# Patient Record
Sex: FEMALE | Race: BLACK OR AFRICAN AMERICAN | NOT HISPANIC OR LATINO | Employment: OTHER | URBAN - METROPOLITAN AREA
[De-identification: names, ages, dates, MRNs, and addresses within clinical notes are randomized per-mention and may not be internally consistent; named-entity substitution may affect disease eponyms.]

---

## 2022-01-01 ENCOUNTER — HISTORICAL (OUTPATIENT)
Dept: ADMINISTRATIVE | Facility: HOSPITAL | Age: 82
End: 2022-01-01

## 2022-01-01 ENCOUNTER — OFFICE VISIT (OUTPATIENT)
Dept: NEUROLOGY | Facility: CLINIC | Age: 82
End: 2022-01-01
Payer: MEDICARE

## 2022-01-01 ENCOUNTER — HOSPITAL ENCOUNTER (OUTPATIENT)
Dept: TELEMEDICINE | Facility: HOSPITAL | Age: 82
Discharge: HOME | End: 2022-01-29
Payer: COMMERCIAL

## 2022-01-01 ENCOUNTER — HOSPITAL ENCOUNTER (EMERGENCY)
Facility: HOSPITAL | Age: 82
Discharge: HOME | End: 2022-06-17
Attending: INTERNAL MEDICINE
Payer: MEDICARE

## 2022-01-01 ENCOUNTER — HOSPITAL ENCOUNTER (INPATIENT)
Facility: HOSPITAL | Age: 82
LOS: 27 days | Discharge: NURSING, REHAB OR LONG-TERM CARE | DRG: 064 | End: 2022-11-03
Attending: INTERNAL MEDICINE | Admitting: INTERNAL MEDICINE
Payer: MEDICARE

## 2022-01-01 ENCOUNTER — HISTORICAL (OUTPATIENT)
Dept: ADMINISTRATIVE | Facility: HOSPITAL | Age: 82
End: 2022-01-01
Payer: MEDICARE

## 2022-01-01 ENCOUNTER — ANESTHESIA EVENT (OUTPATIENT)
Dept: ENDOSCOPY | Facility: HOSPITAL | Age: 82
End: 2022-01-01

## 2022-01-01 ENCOUNTER — HOSPITAL ENCOUNTER (OUTPATIENT)
Dept: RADIOLOGY | Facility: HOSPITAL | Age: 82
Discharge: HOME | DRG: 064 | End: 2022-10-11
Attending: INTERNAL MEDICINE
Payer: MEDICARE

## 2022-01-01 ENCOUNTER — HOSPITAL ENCOUNTER (OUTPATIENT)
Dept: RADIOLOGY | Facility: HOSPITAL | Age: 82
Discharge: HOME | DRG: 064 | End: 2022-10-20
Attending: INTERNAL MEDICINE
Payer: MEDICARE

## 2022-01-01 ENCOUNTER — ANESTHESIA (OUTPATIENT)
Dept: ENDOSCOPY | Facility: HOSPITAL | Age: 82
End: 2022-01-01
Payer: MEDICARE

## 2022-01-01 ENCOUNTER — OFFICE VISIT (OUTPATIENT)
Dept: URGENT CARE | Facility: CLINIC | Age: 82
End: 2022-01-01
Payer: MEDICARE

## 2022-01-01 VITALS
BODY MASS INDEX: 18.4 KG/M2 | HEIGHT: 62 IN | SYSTOLIC BLOOD PRESSURE: 138 MMHG | TEMPERATURE: 98 F | DIASTOLIC BLOOD PRESSURE: 68 MMHG | WEIGHT: 100 LBS | HEART RATE: 69 BPM | RESPIRATION RATE: 18 BRPM | OXYGEN SATURATION: 97 %

## 2022-01-01 VITALS
BODY MASS INDEX: 19.26 KG/M2 | HEIGHT: 61 IN | SYSTOLIC BLOOD PRESSURE: 122 MMHG | WEIGHT: 102 LBS | DIASTOLIC BLOOD PRESSURE: 76 MMHG

## 2022-01-01 VITALS
WEIGHT: 104 LBS | TEMPERATURE: 98 F | SYSTOLIC BLOOD PRESSURE: 168 MMHG | DIASTOLIC BLOOD PRESSURE: 90 MMHG | RESPIRATION RATE: 16 BRPM | HEART RATE: 93 BPM | HEIGHT: 60 IN | OXYGEN SATURATION: 93 % | BODY MASS INDEX: 20.42 KG/M2

## 2022-01-01 VITALS
SYSTOLIC BLOOD PRESSURE: 176 MMHG | DIASTOLIC BLOOD PRESSURE: 80 MMHG | TEMPERATURE: 98 F | OXYGEN SATURATION: 97 % | RESPIRATION RATE: 16 BRPM | HEART RATE: 64 BPM | BODY MASS INDEX: 19.32 KG/M2 | HEIGHT: 62 IN | WEIGHT: 105 LBS

## 2022-01-01 VITALS
BODY MASS INDEX: 19.4 KG/M2 | SYSTOLIC BLOOD PRESSURE: 122 MMHG | DIASTOLIC BLOOD PRESSURE: 84 MMHG | WEIGHT: 102.75 LBS | HEIGHT: 61 IN

## 2022-01-01 DIAGNOSIS — M79.672 FOOT PAIN, LEFT: ICD-10-CM
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
## 2022-01-01 DIAGNOSIS — R47.01 APHASIA: ICD-10-CM

## 2022-01-01 DIAGNOSIS — I47.10 SVT (SUPRAVENTRICULAR TACHYCARDIA): ICD-10-CM

## 2022-01-01 DIAGNOSIS — I63.9 STROKE: Primary | ICD-10-CM

## 2022-01-01 DIAGNOSIS — R56.9 SEIZURES: ICD-10-CM

## 2022-01-01 DIAGNOSIS — R00.0 SINUS TACHYCARDIA: ICD-10-CM

## 2022-01-01 DIAGNOSIS — R82.90 FOUL SMELLING URINE: Primary | ICD-10-CM

## 2022-01-01 DIAGNOSIS — R13.12 OROPHARYNGEAL DYSPHAGIA: ICD-10-CM

## 2022-01-01 DIAGNOSIS — G45.9 TIA (TRANSIENT ISCHEMIC ATTACK): ICD-10-CM

## 2022-01-01 DIAGNOSIS — I63.9 CEREBROVASCULAR ACCIDENT (CVA), UNSPECIFIED MECHANISM: Primary | ICD-10-CM

## 2022-01-01 DIAGNOSIS — R07.9 CHEST PAIN: ICD-10-CM

## 2022-01-01 DIAGNOSIS — F03.91 DEMENTIA WITH BEHAVIORAL DISTURBANCE, UNSPECIFIED DEMENTIA TYPE: ICD-10-CM
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
## 2022-01-01 DIAGNOSIS — R29.90 STROKE-LIKE SYMPTOMS: ICD-10-CM

## 2022-01-01 DIAGNOSIS — I65.29 CAROTID ARTERY STENOSIS: ICD-10-CM

## 2022-01-01 LAB
ABS NEUT CALC (OHS): 1.32 X10(3)/MCL (ref 2.1–9.2)
ALBUMIN SERPL-MCNC: 2.4 GM/DL (ref 3.4–4.8)
ALBUMIN SERPL-MCNC: 2.8 GM/DL (ref 3.4–4.8)
ALBUMIN SERPL-MCNC: 2.9 GM/DL (ref 3.4–4.8)
ALBUMIN SERPL-MCNC: 2.9 GM/DL (ref 3.4–4.8)
ALBUMIN SERPL-MCNC: 3.4 GM/DL (ref 3.4–4.8)
ALBUMIN SERPL-MCNC: 3.4 GM/DL (ref 3.4–4.8)
ALBUMIN SERPL-MCNC: 3.5 GM/DL (ref 3.4–4.8)
ALBUMIN SERPL-MCNC: 3.5 GM/DL (ref 3.4–4.8)
ALBUMIN SERPL-MCNC: 3.6 GM/DL (ref 3.4–4.8)
ALBUMIN SERPL-MCNC: 4.2 GM/DL (ref 3.4–4.8)
ALBUMIN/GLOB SERPL: 0.6 RATIO (ref 1.1–2)
ALBUMIN/GLOB SERPL: 0.8 RATIO (ref 1.1–2)
ALBUMIN/GLOB SERPL: 0.9 RATIO (ref 1.1–2)
ALBUMIN/GLOB SERPL: 1 RATIO (ref 1.1–2)
ALBUMIN/GLOB SERPL: 1.1 RATIO (ref 1.1–2)
ALBUMIN/GLOB SERPL: 1.1 RATIO (ref 1.1–2)
ALP SERPL-CCNC: 105 UNIT/L (ref 40–150)
ALP SERPL-CCNC: 67 UNIT/L (ref 40–150)
ALP SERPL-CCNC: 72 UNIT/L (ref 40–150)
ALP SERPL-CCNC: 75 UNIT/L (ref 40–150)
ALP SERPL-CCNC: 87 UNIT/L (ref 40–150)
ALP SERPL-CCNC: 88 UNIT/L (ref 40–150)
ALP SERPL-CCNC: 95 UNIT/L (ref 40–150)
ALP SERPL-CCNC: 95 UNIT/L (ref 40–150)
ALP SERPL-CCNC: 96 UNIT/L (ref 40–150)
ALP SERPL-CCNC: 98 UNIT/L (ref 40–150)
ALT SERPL-CCNC: 12 UNIT/L (ref 0–55)
ALT SERPL-CCNC: 12 UNIT/L (ref 0–55)
ALT SERPL-CCNC: 13 UNIT/L (ref 0–55)
ALT SERPL-CCNC: 14 UNIT/L (ref 0–55)
ALT SERPL-CCNC: 14 UNIT/L (ref 0–55)
ALT SERPL-CCNC: 15 UNIT/L (ref 0–55)
ALT SERPL-CCNC: 17 UNIT/L (ref 0–55)
ALT SERPL-CCNC: 17 UNIT/L (ref 0–55)
ALT SERPL-CCNC: 19 UNIT/L (ref 0–55)
ALT SERPL-CCNC: 19 UNIT/L (ref 0–55)
ANION GAP SERPL CALC-SCNC: 11 MEQ/L
ANION GAP SERPL CALC-SCNC: 12 MEQ/L
ANION GAP SERPL CALC-SCNC: 13 MEQ/L
ANION GAP SERPL CALC-SCNC: 13 MEQ/L
ANION GAP SERPL CALC-SCNC: 7 MEQ/L
ANION GAP SERPL CALC-SCNC: 8 MEQ/L
ANION GAP SERPL CALC-SCNC: 8 MEQ/L
ANION GAP SERPL CALC-SCNC: 9 MEQ/L
ANISOCYTOSIS BLD QL SMEAR: ABNORMAL
APPEARANCE UR: CLEAR
APTT PPP: 40.3 SECONDS (ref 23.4–33.9)
AST SERPL-CCNC: 22 UNIT/L (ref 5–34)
AST SERPL-CCNC: 25 UNIT/L (ref 5–34)
AST SERPL-CCNC: 28 UNIT/L (ref 5–34)
AST SERPL-CCNC: 33 UNIT/L (ref 5–34)
AST SERPL-CCNC: 35 UNIT/L (ref 5–34)
AST SERPL-CCNC: 37 UNIT/L (ref 5–34)
AST SERPL-CCNC: 37 UNIT/L (ref 5–34)
AST SERPL-CCNC: 43 UNIT/L (ref 5–34)
AST SERPL-CCNC: 52 UNIT/L (ref 5–34)
AST SERPL-CCNC: 56 UNIT/L (ref 5–34)
BACTERIA #/AREA URNS AUTO: ABNORMAL /HPF
BACTERIA BLD CULT: NORMAL
BACTERIA UR CULT: NO GROWTH
BASOPHILS # BLD AUTO: 0.04 X10(3)/MCL (ref 0–0.2)
BASOPHILS # BLD AUTO: 0.05 X10(3)/MCL (ref 0–0.2)
BASOPHILS # BLD AUTO: 0.06 X10(3)/MCL (ref 0–0.2)
BASOPHILS # BLD AUTO: 0.07 X10(3)/MCL (ref 0–0.2)
BASOPHILS # BLD AUTO: 0.08 X10(3)/MCL (ref 0–0.2)
BASOPHILS # BLD AUTO: 0.09 X10(3)/MCL (ref 0–0.2)
BASOPHILS # BLD AUTO: 0.1 X10(3)/MCL (ref 0–0.2)
BASOPHILS # BLD AUTO: 0.12 X10(3)/MCL (ref 0–0.2)
BASOPHILS # BLD AUTO: 0.12 X10(3)/MCL (ref 0–0.2)
BASOPHILS # BLD AUTO: 0.14 X10(3)/MCL (ref 0–0.2)
BASOPHILS # BLD AUTO: 0.16 X10(3)/MCL (ref 0–0.2)
BASOPHILS NFR BLD AUTO: 0.3 %
BASOPHILS NFR BLD AUTO: 0.5 %
BASOPHILS NFR BLD AUTO: 0.7 %
BASOPHILS NFR BLD AUTO: 0.7 %
BASOPHILS NFR BLD AUTO: 0.8 %
BASOPHILS NFR BLD AUTO: 1 %
BASOPHILS NFR BLD AUTO: 1 %
BASOPHILS NFR BLD AUTO: 1.2 %
BASOPHILS NFR BLD AUTO: 1.4 %
BASOPHILS NFR BLD AUTO: 1.5 %
BASOPHILS NFR BLD AUTO: 1.7 %
BILIRUB UR QL STRIP.AUTO: NEGATIVE MG/DL
BILIRUB UR QL STRIP: NEGATIVE
BILIRUBIN DIRECT+TOT PNL SERPL-MCNC: 0.3 MG/DL
BILIRUBIN DIRECT+TOT PNL SERPL-MCNC: 0.4 MG/DL
BILIRUBIN DIRECT+TOT PNL SERPL-MCNC: 0.5 MG/DL
BILIRUBIN DIRECT+TOT PNL SERPL-MCNC: 0.6 MG/DL
BNP BLD-MCNC: 83.7 PG/ML
BUN SERPL-MCNC: 11.4 MG/DL (ref 9.8–20.1)
BUN SERPL-MCNC: 12.4 MG/DL (ref 9.8–20.1)
BUN SERPL-MCNC: 14.2 MG/DL (ref 9.8–20.1)
BUN SERPL-MCNC: 15.2 MG/DL (ref 9.8–20.1)
BUN SERPL-MCNC: 18.6 MG/DL (ref 9.8–20.1)
BUN SERPL-MCNC: 19 MG/DL (ref 9.8–20.1)
BUN SERPL-MCNC: 19.3 MG/DL (ref 9.8–20.1)
BUN SERPL-MCNC: 19.6 MG/DL (ref 9.8–20.1)
BUN SERPL-MCNC: 19.7 MG/DL (ref 9.8–20.1)
BUN SERPL-MCNC: 20.1 MG/DL (ref 9.8–20.1)
BUN SERPL-MCNC: 21.1 MG/DL (ref 9.8–20.1)
BUN SERPL-MCNC: 25.6 MG/DL (ref 9.8–20.1)
BUN SERPL-MCNC: 27.5 MG/DL (ref 9.8–20.1)
BUN SERPL-MCNC: 28.3 MG/DL (ref 9.8–20.1)
BUN SERPL-MCNC: 28.7 MG/DL (ref 9.8–20.1)
BUN SERPL-MCNC: 31.1 MG/DL (ref 9.8–20.1)
BUN SERPL-MCNC: 4.5 MG/DL (ref 9.8–20.1)
BUN SERPL-MCNC: 5.4 MG/DL (ref 9.8–20.1)
BUN SERPL-MCNC: 6.6 MG/DL (ref 9.8–20.1)
BUN SERPL-MCNC: 9.2 MG/DL (ref 9.8–20.1)
CALCIUM SERPL-MCNC: 10.4 MG/DL (ref 8.4–10.2)
CALCIUM SERPL-MCNC: 10.5 MG/DL (ref 8.4–10.2)
CALCIUM SERPL-MCNC: 8.2 MG/DL (ref 8.4–10.2)
CALCIUM SERPL-MCNC: 8.4 MG/DL (ref 8.4–10.2)
CALCIUM SERPL-MCNC: 8.5 MG/DL (ref 8.4–10.2)
CALCIUM SERPL-MCNC: 8.7 MG/DL (ref 8.4–10.2)
CALCIUM SERPL-MCNC: 8.7 MG/DL (ref 8.4–10.2)
CALCIUM SERPL-MCNC: 8.9 MG/DL (ref 8.4–10.2)
CALCIUM SERPL-MCNC: 9 MG/DL (ref 8.4–10.2)
CALCIUM SERPL-MCNC: 9 MG/DL (ref 8.4–10.2)
CALCIUM SERPL-MCNC: 9.3 MG/DL (ref 8.4–10.2)
CALCIUM SERPL-MCNC: 9.5 MG/DL (ref 8.4–10.2)
CALCIUM SERPL-MCNC: 9.6 MG/DL (ref 8.4–10.2)
CALCIUM SERPL-MCNC: 9.6 MG/DL (ref 8.4–10.2)
CALCIUM SERPL-MCNC: 9.7 MG/DL (ref 8.4–10.2)
CALCIUM SERPL-MCNC: 9.8 MG/DL (ref 8.4–10.2)
CHLORIDE SERPL-SCNC: 103 MMOL/L (ref 98–107)
CHLORIDE SERPL-SCNC: 103 MMOL/L (ref 98–107)
CHLORIDE SERPL-SCNC: 104 MMOL/L (ref 98–107)
CHLORIDE SERPL-SCNC: 104 MMOL/L (ref 98–107)
CHLORIDE SERPL-SCNC: 105 MMOL/L (ref 98–107)
CHLORIDE SERPL-SCNC: 105 MMOL/L (ref 98–107)
CHLORIDE SERPL-SCNC: 108 MMOL/L (ref 98–107)
CHLORIDE SERPL-SCNC: 109 MMOL/L (ref 98–107)
CHLORIDE SERPL-SCNC: 109 MMOL/L (ref 98–107)
CHLORIDE SERPL-SCNC: 110 MMOL/L (ref 98–107)
CHLORIDE SERPL-SCNC: 112 MMOL/L (ref 98–107)
CHLORIDE SERPL-SCNC: 113 MMOL/L (ref 98–107)
CHLORIDE SERPL-SCNC: 113 MMOL/L (ref 98–107)
CHLORIDE SERPL-SCNC: 114 MMOL/L (ref 98–107)
CHLORIDE SERPL-SCNC: 99 MMOL/L (ref 98–107)
CHOLEST SERPL-MCNC: 169 MG/DL
CHOLEST/HDLC SERPL: 3 {RATIO} (ref 0–5)
CO2 SERPL-SCNC: 16 MMOL/L (ref 23–31)
CO2 SERPL-SCNC: 19 MMOL/L (ref 23–31)
CO2 SERPL-SCNC: 20 MMOL/L (ref 23–31)
CO2 SERPL-SCNC: 20 MMOL/L (ref 23–31)
CO2 SERPL-SCNC: 21 MMOL/L (ref 23–31)
CO2 SERPL-SCNC: 23 MMOL/L (ref 23–31)
CO2 SERPL-SCNC: 25 MMOL/L (ref 23–31)
CO2 SERPL-SCNC: 25 MMOL/L (ref 23–31)
CO2 SERPL-SCNC: 28 MMOL/L (ref 23–31)
CO2 SERPL-SCNC: 28 MMOL/L (ref 23–31)
COLOR UR AUTO: YELLOW
CORRECTED TEMPERATURE (PCO2): 31 MMHG (ref 35–45)
CORRECTED TEMPERATURE (PH): 7.36 (ref 7.35–7.45)
CORRECTED TEMPERATURE (PO2): 160 MMHG (ref 80–100)
CREAT SERPL-MCNC: 0.73 MG/DL (ref 0.55–1.02)
CREAT SERPL-MCNC: 0.77 MG/DL (ref 0.55–1.02)
CREAT SERPL-MCNC: 0.77 MG/DL (ref 0.55–1.02)
CREAT SERPL-MCNC: 0.8 MG/DL (ref 0.55–1.02)
CREAT SERPL-MCNC: 0.8 MG/DL (ref 0.55–1.02)
CREAT SERPL-MCNC: 0.83 MG/DL (ref 0.55–1.02)
CREAT SERPL-MCNC: 0.84 MG/DL (ref 0.55–1.02)
CREAT SERPL-MCNC: 0.87 MG/DL (ref 0.55–1.02)
CREAT SERPL-MCNC: 0.88 MG/DL (ref 0.55–1.02)
CREAT SERPL-MCNC: 0.89 MG/DL (ref 0.55–1.02)
CREAT SERPL-MCNC: 0.9 MG/DL (ref 0.55–1.02)
CREAT SERPL-MCNC: 0.92 MG/DL (ref 0.55–1.02)
CREAT SERPL-MCNC: 0.93 MG/DL (ref 0.55–1.02)
CREAT SERPL-MCNC: 0.94 MG/DL (ref 0.55–1.02)
CREAT SERPL-MCNC: 0.97 MG/DL (ref 0.55–1.02)
CREAT SERPL-MCNC: 0.99 MG/DL (ref 0.55–1.02)
CREAT SERPL-MCNC: 1.08 MG/DL (ref 0.55–1.02)
CREAT SERPL-MCNC: 1.22 MG/DL (ref 0.55–1.02)
CREAT/UREA NIT SERPL: 12
CREAT/UREA NIT SERPL: 14
CREAT/UREA NIT SERPL: 29
CREAT/UREA NIT SERPL: 32
CREAT/UREA NIT SERPL: 33
CREAT/UREA NIT SERPL: 33
CREAT/UREA NIT SERPL: 34
CREAT/UREA NIT SERPL: 6
CREAT/UREA NIT SERPL: 7
CREAT/UREA NIT SERPL: 8
EOSINOPHIL # BLD AUTO: 0.01 X10(3)/MCL (ref 0–0.9)
EOSINOPHIL # BLD AUTO: 0.02 X10(3)/MCL (ref 0–0.9)
EOSINOPHIL # BLD AUTO: 0.04 X10(3)/MCL (ref 0–0.9)
EOSINOPHIL # BLD AUTO: 0.06 X10(3)/MCL (ref 0–0.9)
EOSINOPHIL # BLD AUTO: 0.07 X10(3)/MCL (ref 0–0.9)
EOSINOPHIL # BLD AUTO: 0.09 X10(3)/MCL (ref 0–0.9)
EOSINOPHIL # BLD AUTO: 0.09 X10(3)/MCL (ref 0–0.9)
EOSINOPHIL # BLD AUTO: 0.14 X10(3)/MCL (ref 0–0.9)
EOSINOPHIL # BLD AUTO: 0.15 X10(3)/MCL (ref 0–0.9)
EOSINOPHIL # BLD AUTO: 0.15 X10(3)/MCL (ref 0–0.9)
EOSINOPHIL # BLD AUTO: 0.16 X10(3)/MCL (ref 0–0.9)
EOSINOPHIL # BLD AUTO: 0.16 X10(3)/MCL (ref 0–0.9)
EOSINOPHIL # BLD AUTO: 0.2 X10(3)/MCL (ref 0–0.9)
EOSINOPHIL NFR BLD AUTO: 0.1 %
EOSINOPHIL NFR BLD AUTO: 0.1 %
EOSINOPHIL NFR BLD AUTO: 0.4 %
EOSINOPHIL NFR BLD AUTO: 0.5 %
EOSINOPHIL NFR BLD AUTO: 0.6 %
EOSINOPHIL NFR BLD AUTO: 0.6 %
EOSINOPHIL NFR BLD AUTO: 0.7 %
EOSINOPHIL NFR BLD AUTO: 1.2 %
EOSINOPHIL NFR BLD AUTO: 1.7 %
EOSINOPHIL NFR BLD AUTO: 1.8 %
EOSINOPHIL NFR BLD AUTO: 1.9 %
EOSINOPHIL NFR BLD AUTO: 2.4 %
EOSINOPHIL NFR BLD AUTO: 4.2 %
EOSINOPHIL NFR BLD MANUAL: 0.28 X10(3)/MCL (ref 0–0.9)
EOSINOPHIL NFR BLD MANUAL: 6 % (ref 0–8)
ERYTHROCYTE [DISTWIDTH] IN BLOOD BY AUTOMATED COUNT: 14 % (ref 11.5–17)
ERYTHROCYTE [DISTWIDTH] IN BLOOD BY AUTOMATED COUNT: 14.1 % (ref 11.5–17)
ERYTHROCYTE [DISTWIDTH] IN BLOOD BY AUTOMATED COUNT: 14.2 % (ref 11.5–17)
ERYTHROCYTE [DISTWIDTH] IN BLOOD BY AUTOMATED COUNT: 14.2 % (ref 11.5–17)
ERYTHROCYTE [DISTWIDTH] IN BLOOD BY AUTOMATED COUNT: 14.3 % (ref 11.5–17)
ERYTHROCYTE [DISTWIDTH] IN BLOOD BY AUTOMATED COUNT: 14.3 % (ref 11.5–17)
ERYTHROCYTE [DISTWIDTH] IN BLOOD BY AUTOMATED COUNT: 14.4 % (ref 11.5–17)
ERYTHROCYTE [DISTWIDTH] IN BLOOD BY AUTOMATED COUNT: 14.5 % (ref 11.5–17)
ERYTHROCYTE [DISTWIDTH] IN BLOOD BY AUTOMATED COUNT: 14.5 % (ref 11.5–17)
ERYTHROCYTE [DISTWIDTH] IN BLOOD BY AUTOMATED COUNT: 14.6 % (ref 11.5–17)
ERYTHROCYTE [DISTWIDTH] IN BLOOD BY AUTOMATED COUNT: 14.7 % (ref 11.5–17)
ERYTHROCYTE [DISTWIDTH] IN BLOOD BY AUTOMATED COUNT: 14.7 % (ref 11.5–17)
ERYTHROCYTE [DISTWIDTH] IN BLOOD BY AUTOMATED COUNT: 14.8 % (ref 11.5–17)
EST. AVERAGE GLUCOSE BLD GHB EST-MCNC: 114 MG/DL
ESTROGEN SERPL-MCNC: NORMAL PG/ML
GFR SERPLBLD CREATININE-BSD FMLA CKD-EPI: 45 MLS/MIN/1.73/M2
GFR SERPLBLD CREATININE-BSD FMLA CKD-EPI: 52 MLS/MIN/1.73/M2
GFR SERPLBLD CREATININE-BSD FMLA CKD-EPI: 57 MLS/MIN/1.73/M2
GFR SERPLBLD CREATININE-BSD FMLA CKD-EPI: 59 MLS/MIN/1.73/M2
GFR SERPLBLD CREATININE-BSD FMLA CKD-EPI: >60 MLS/MIN/1.73/M2
GLOBULIN SER-MCNC: 3 GM/DL (ref 2.4–3.5)
GLOBULIN SER-MCNC: 3.2 GM/DL (ref 2.4–3.5)
GLOBULIN SER-MCNC: 3.4 GM/DL (ref 2.4–3.5)
GLOBULIN SER-MCNC: 3.7 GM/DL (ref 2.4–3.5)
GLOBULIN SER-MCNC: 3.8 GM/DL (ref 2.4–3.5)
GLOBULIN SER-MCNC: 3.9 GM/DL (ref 2.4–3.5)
GLOBULIN SER-MCNC: 4 GM/DL (ref 2.4–3.5)
GLOBULIN SER-MCNC: 4.2 GM/DL (ref 2.4–3.5)
GLOBULIN SER-MCNC: 4.2 GM/DL (ref 2.4–3.5)
GLOBULIN SER-MCNC: 4.4 GM/DL (ref 2.4–3.5)
GLUCOSE SERPL-MCNC: 100 MG/DL (ref 82–115)
GLUCOSE SERPL-MCNC: 106 MG/DL (ref 82–115)
GLUCOSE SERPL-MCNC: 107 MG/DL (ref 82–115)
GLUCOSE SERPL-MCNC: 108 MG/DL (ref 82–115)
GLUCOSE SERPL-MCNC: 110 MG/DL (ref 82–115)
GLUCOSE SERPL-MCNC: 113 MG/DL (ref 82–115)
GLUCOSE SERPL-MCNC: 113 MG/DL (ref 82–115)
GLUCOSE SERPL-MCNC: 119 MG/DL (ref 82–115)
GLUCOSE SERPL-MCNC: 123 MG/DL (ref 82–115)
GLUCOSE SERPL-MCNC: 125 MG/DL (ref 82–115)
GLUCOSE SERPL-MCNC: 131 MG/DL (ref 82–115)
GLUCOSE SERPL-MCNC: 134 MG/DL (ref 82–115)
GLUCOSE SERPL-MCNC: 137 MG/DL (ref 82–115)
GLUCOSE SERPL-MCNC: 148 MG/DL (ref 82–115)
GLUCOSE SERPL-MCNC: 164 MG/DL (ref 82–115)
GLUCOSE SERPL-MCNC: 176 MG/DL (ref 82–115)
GLUCOSE SERPL-MCNC: 182 MG/DL (ref 82–115)
GLUCOSE SERPL-MCNC: 79 MG/DL (ref 82–115)
GLUCOSE SERPL-MCNC: 83 MG/DL (ref 82–115)
GLUCOSE SERPL-MCNC: 97 MG/DL (ref 82–115)
GLUCOSE UR QL STRIP.AUTO: NEGATIVE MG/DL
GLUCOSE UR QL STRIP: NEGATIVE
HBA1C MFR BLD: 5.6 %
HCO3 UR-SCNC: 17.5 MMOL/L (ref 22–26)
HCT VFR BLD AUTO: 30.5 % (ref 37–47)
HCT VFR BLD AUTO: 31.4 % (ref 37–47)
HCT VFR BLD AUTO: 31.4 % (ref 37–47)
HCT VFR BLD AUTO: 31.6 % (ref 37–47)
HCT VFR BLD AUTO: 31.7 % (ref 37–47)
HCT VFR BLD AUTO: 32.7 % (ref 37–47)
HCT VFR BLD AUTO: 34.2 % (ref 37–47)
HCT VFR BLD AUTO: 35.5 % (ref 37–47)
HCT VFR BLD AUTO: 36.3 % (ref 37–47)
HCT VFR BLD AUTO: 36.8 % (ref 37–47)
HCT VFR BLD AUTO: 37 % (ref 37–47)
HCT VFR BLD AUTO: 39.8 % (ref 37–47)
HCT VFR BLD AUTO: 40.2 % (ref 37–47)
HCT VFR BLD AUTO: 42.5 % (ref 37–47)
HCT VFR BLD AUTO: 45.1 % (ref 37–47)
HDLC SERPL-MCNC: 55 MG/DL (ref 35–60)
HGB BLD-MCNC: 10 GM/DL (ref 12–16)
HGB BLD-MCNC: 10.1 GM/DL (ref 12–16)
HGB BLD-MCNC: 10.2 GM/DL (ref 12–16)
HGB BLD-MCNC: 10.3 GM/DL (ref 12–16)
HGB BLD-MCNC: 10.3 GM/DL (ref 12–16)
HGB BLD-MCNC: 10.6 G/DL (ref 12–16)
HGB BLD-MCNC: 11 GM/DL (ref 12–16)
HGB BLD-MCNC: 11.2 GM/DL (ref 12–16)
HGB BLD-MCNC: 11.5 GM/DL (ref 12–16)
HGB BLD-MCNC: 11.7 GM/DL (ref 12–16)
HGB BLD-MCNC: 11.7 GM/DL (ref 12–16)
HGB BLD-MCNC: 12.3 GM/DL (ref 12–16)
HGB BLD-MCNC: 12.4 GM/DL (ref 12–16)
HGB BLD-MCNC: 12.8 GM/DL (ref 12–16)
HGB BLD-MCNC: 13.3 GM/DL (ref 12–16)
HGB BLD-MCNC: 13.9 GM/DL (ref 12–16)
IMM GRANULOCYTES # BLD AUTO: 0.01 X10(3)/MCL (ref 0–0.04)
IMM GRANULOCYTES # BLD AUTO: 0.03 X10(3)/MCL (ref 0–0.04)
IMM GRANULOCYTES # BLD AUTO: 0.03 X10(3)/MCL (ref 0–0.04)
IMM GRANULOCYTES # BLD AUTO: 0.04 X10(3)/MCL (ref 0–0.04)
IMM GRANULOCYTES # BLD AUTO: 0.04 X10(3)/MCL (ref 0–0.04)
IMM GRANULOCYTES # BLD AUTO: 0.05 X10(3)/MCL (ref 0–0.04)
IMM GRANULOCYTES # BLD AUTO: 0.06 X10(3)/MCL (ref 0–0.04)
IMM GRANULOCYTES # BLD AUTO: 0.07 X10(3)/MCL (ref 0–0.04)
IMM GRANULOCYTES # BLD AUTO: 0.07 X10(3)/MCL (ref 0–0.04)
IMM GRANULOCYTES # BLD AUTO: 0.08 X10(3)/MCL (ref 0–0.04)
IMM GRANULOCYTES # BLD AUTO: 0.08 X10(3)/MCL (ref 0–0.04)
IMM GRANULOCYTES # BLD AUTO: 0.09 X10(3)/MCL (ref 0–0.04)
IMM GRANULOCYTES NFR BLD AUTO: 0.2 %
IMM GRANULOCYTES NFR BLD AUTO: 0.3 %
IMM GRANULOCYTES NFR BLD AUTO: 0.3 %
IMM GRANULOCYTES NFR BLD AUTO: 0.4 %
IMM GRANULOCYTES NFR BLD AUTO: 0.4 %
IMM GRANULOCYTES NFR BLD AUTO: 0.5 %
IMM GRANULOCYTES NFR BLD AUTO: 0.7 %
IMM GRANULOCYTES NFR BLD AUTO: 0.8 %
IMM GRANULOCYTES NFR BLD AUTO: 0.9 %
INR BLD: 1.34 (ref 2–3)
INSULIN SERPL-ACNC: NORMAL U[IU]/ML
KETONES UR QL STRIP.AUTO: NEGATIVE MG/DL
KETONES UR QL STRIP: NEGATIVE
LAB AP CLINICAL INFORMATION: NORMAL
LAB AP GROSS DESCRIPTION: NORMAL
LAB AP REPORT FOOTNOTES: NORMAL
LDLC SERPL CALC-MCNC: 102 MG/DL (ref 50–140)
LEFT CCA DIST DIAS: 12 CM/S
LEFT CCA DIST DIAS: 13 CM/S
LEFT CCA DIST SYS: 125 CM/S
LEFT CCA DIST SYS: 74 CM/S
LEFT CCA PROX DIAS: 12 CM/S
LEFT CCA PROX DIAS: 13 CM/S
LEFT CCA PROX SYS: 114 CM/S
LEFT CCA PROX SYS: 73 CM/S
LEFT ECA DIAS: 10 CM/S
LEFT ECA SYS: 100 CM/S
LEFT ICA DIST DIAS: 18 CM/S
LEFT ICA DIST SYS: 143 CM/S
LEFT ICA DIST SYS: 146 CM/S
LEFT ICA MID DIAS: 43 CM/S
LEFT ICA MID SYS: 169 CM/S
LEFT ICA MID SYS: 191 CM/S
LEFT ICA PROX DIAS: 32 CM/S
LEFT ICA PROX SYS: 122 CM/S
LEFT ICA PROX SYS: 209 CM/S
LEFT VERTEBRAL DIAS: 9 CM/S
LEFT VERTEBRAL SYS: 80 CM/S
LEUKOCYTE ESTERASE UR QL STRIP.AUTO: NEGATIVE UNIT/L
LEUKOCYTE ESTERASE UR QL STRIP: NEGATIVE
LYMPHOCYTES # BLD AUTO: 1.17 X10(3)/MCL (ref 0.6–4.6)
LYMPHOCYTES # BLD AUTO: 1.32 X10(3)/MCL (ref 0.6–4.6)
LYMPHOCYTES # BLD AUTO: 1.4 X10(3)/MCL (ref 0.6–4.6)
LYMPHOCYTES # BLD AUTO: 1.47 X10(3)/MCL (ref 0.6–4.6)
LYMPHOCYTES # BLD AUTO: 1.49 X10(3)/MCL (ref 0.6–4.6)
LYMPHOCYTES # BLD AUTO: 1.57 X10(3)/MCL (ref 0.6–4.6)
LYMPHOCYTES # BLD AUTO: 1.59 X10(3)/MCL (ref 0.6–4.6)
LYMPHOCYTES # BLD AUTO: 1.68 X10(3)/MCL (ref 0.6–4.6)
LYMPHOCYTES # BLD AUTO: 1.68 X10(3)/MCL (ref 0.6–4.6)
LYMPHOCYTES # BLD AUTO: 1.8 X10(3)/MCL (ref 0.6–4.6)
LYMPHOCYTES # BLD AUTO: 1.89 X10(3)/MCL (ref 0.6–4.6)
LYMPHOCYTES # BLD AUTO: 2.19 X10(3)/MCL (ref 0.6–4.6)
LYMPHOCYTES # BLD AUTO: 2.28 X10(3)/MCL (ref 0.6–4.6)
LYMPHOCYTES NFR BLD AUTO: 10.2 %
LYMPHOCYTES NFR BLD AUTO: 12.3 %
LYMPHOCYTES NFR BLD AUTO: 13 %
LYMPHOCYTES NFR BLD AUTO: 13.5 %
LYMPHOCYTES NFR BLD AUTO: 15.6 %
LYMPHOCYTES NFR BLD AUTO: 17.1 %
LYMPHOCYTES NFR BLD AUTO: 19 %
LYMPHOCYTES NFR BLD AUTO: 19.2 %
LYMPHOCYTES NFR BLD AUTO: 23 %
LYMPHOCYTES NFR BLD AUTO: 34.3 %
LYMPHOCYTES NFR BLD AUTO: 46.2 %
LYMPHOCYTES NFR BLD AUTO: 8.2 %
LYMPHOCYTES NFR BLD AUTO: 9.8 %
LYMPHOCYTES NFR BLD MANUAL: 2.91 X10(3)/MCL
LYMPHOCYTES NFR BLD MANUAL: 62 % (ref 13–40)
MAGNESIUM SERPL-MCNC: 1.7 MG/DL (ref 1.6–2.6)
MAGNESIUM SERPL-MCNC: 2 MG/DL (ref 1.6–2.6)
MAGNESIUM SERPL-MCNC: 2.3 MG/DL (ref 1.6–2.6)
MAGNESIUM SERPL-MCNC: 2.4 MG/DL (ref 1.6–2.6)
MCH RBC QN AUTO: 29.5 PG (ref 27–31)
MCH RBC QN AUTO: 29.7 PG (ref 27–31)
MCH RBC QN AUTO: 29.7 PG (ref 27–31)
MCH RBC QN AUTO: 29.8 PG (ref 27–31)
MCH RBC QN AUTO: 29.9 PG (ref 27–31)
MCH RBC QN AUTO: 30 PG (ref 27–31)
MCH RBC QN AUTO: 30.1 PG (ref 27–31)
MCH RBC QN AUTO: 30.1 PG (ref 27–31)
MCH RBC QN AUTO: 30.2 PG (ref 27–31)
MCH RBC QN AUTO: 30.2 PG (ref 27–31)
MCH RBC QN AUTO: 30.4 PG (ref 27–31)
MCH RBC QN AUTO: 30.6 PG (ref 27–31)
MCH RBC QN AUTO: 30.8 PG (ref 27–31)
MCHC RBC AUTO-ENTMCNC: 30.8 MG/DL (ref 33–36)
MCHC RBC AUTO-ENTMCNC: 30.9 MG/DL (ref 33–36)
MCHC RBC AUTO-ENTMCNC: 31.2 MG/DL (ref 33–36)
MCHC RBC AUTO-ENTMCNC: 31.3 MG/DL (ref 33–36)
MCHC RBC AUTO-ENTMCNC: 31.8 MG/DL (ref 33–36)
MCHC RBC AUTO-ENTMCNC: 31.8 MG/DL (ref 33–36)
MCHC RBC AUTO-ENTMCNC: 32 MG/DL (ref 33–36)
MCHC RBC AUTO-ENTMCNC: 32.5 MG/DL (ref 33–36)
MCHC RBC AUTO-ENTMCNC: 32.5 MG/DL (ref 33–36)
MCHC RBC AUTO-ENTMCNC: 32.8 MG/DL (ref 33–36)
MCHC RBC AUTO-ENTMCNC: 32.8 MG/DL (ref 33–36)
MCHC RBC AUTO-ENTMCNC: 33 MG/DL (ref 33–36)
MCHC RBC AUTO-ENTMCNC: 33.2 MG/DL (ref 33–36)
MCHC RBC AUTO-ENTMCNC: 33.6 MG/DL (ref 33–36)
MCHC RBC AUTO-ENTMCNC: 33.6 MG/DL (ref 33–36)
MCV RBC AUTO: 89.8 FL (ref 80–94)
MCV RBC AUTO: 89.8 FL (ref 80–94)
MCV RBC AUTO: 90.5 FL (ref 80–94)
MCV RBC AUTO: 91.3 FL (ref 80–94)
MCV RBC AUTO: 91.4 FL (ref 80–94)
MCV RBC AUTO: 91.7 FL (ref 80–94)
MCV RBC AUTO: 92.6 FL (ref 80–94)
MCV RBC AUTO: 93.2 FL (ref 80–94)
MCV RBC AUTO: 93.5 FL (ref 80–94)
MCV RBC AUTO: 95.2 FL (ref 80–94)
MCV RBC AUTO: 95.6 FL (ref 80–94)
MCV RBC AUTO: 95.7 FL (ref 80–94)
MCV RBC AUTO: 96.4 FL (ref 80–94)
MCV RBC AUTO: 97.3 FL (ref 80–94)
MCV RBC AUTO: 97.6 FL (ref 80–94)
MONOCYTES # BLD AUTO: 0.52 X10(3)/MCL (ref 0.1–1.3)
MONOCYTES # BLD AUTO: 0.54 X10(3)/MCL (ref 0.1–1.3)
MONOCYTES # BLD AUTO: 0.56 X10(3)/MCL (ref 0.1–1.3)
MONOCYTES # BLD AUTO: 0.67 X10(3)/MCL (ref 0.1–1.3)
MONOCYTES # BLD AUTO: 0.83 X10(3)/MCL (ref 0.1–1.3)
MONOCYTES # BLD AUTO: 0.95 X10(3)/MCL (ref 0.1–1.3)
MONOCYTES # BLD AUTO: 0.96 X10(3)/MCL (ref 0.1–1.3)
MONOCYTES # BLD AUTO: 1.04 X10(3)/MCL (ref 0.1–1.3)
MONOCYTES # BLD AUTO: 1.09 X10(3)/MCL (ref 0.1–1.3)
MONOCYTES # BLD AUTO: 1.15 X10(3)/MCL (ref 0.1–1.3)
MONOCYTES # BLD AUTO: 1.17 X10(3)/MCL (ref 0.1–1.3)
MONOCYTES # BLD AUTO: 1.18 X10(3)/MCL (ref 0.1–1.3)
MONOCYTES # BLD AUTO: 1.2 X10(3)/MCL (ref 0.1–1.3)
MONOCYTES NFR BLD AUTO: 10.1 %
MONOCYTES NFR BLD AUTO: 11.4 %
MONOCYTES NFR BLD AUTO: 12.3 %
MONOCYTES NFR BLD AUTO: 14.1 %
MONOCYTES NFR BLD AUTO: 6.3 %
MONOCYTES NFR BLD AUTO: 6.3 %
MONOCYTES NFR BLD AUTO: 6.9 %
MONOCYTES NFR BLD AUTO: 8.2 %
MONOCYTES NFR BLD AUTO: 8.3 %
MONOCYTES NFR BLD AUTO: 8.3 %
MONOCYTES NFR BLD AUTO: 8.4 %
MONOCYTES NFR BLD AUTO: 8.4 %
MONOCYTES NFR BLD AUTO: 9 %
MONOCYTES NFR BLD MANUAL: 0.19 X10(3)/MCL (ref 0.1–1.3)
MONOCYTES NFR BLD MANUAL: 4 % (ref 2–11)
NEUTROPHILS # BLD AUTO: 1.7 X10(3)/MCL (ref 2.1–9.2)
NEUTROPHILS # BLD AUTO: 11.5 X10(3)/MCL (ref 2.1–9.2)
NEUTROPHILS # BLD AUTO: 11.7 X10(3)/MCL (ref 2.1–9.2)
NEUTROPHILS # BLD AUTO: 13.9 X10(3)/MCL (ref 2.1–9.2)
NEUTROPHILS # BLD AUTO: 3.6 X10(3)/MCL (ref 2.1–9.2)
NEUTROPHILS # BLD AUTO: 5.2 X10(3)/MCL (ref 2.1–9.2)
NEUTROPHILS # BLD AUTO: 5.4 X10(3)/MCL (ref 2.1–9.2)
NEUTROPHILS # BLD AUTO: 5.8 X10(3)/MCL (ref 2.1–9.2)
NEUTROPHILS # BLD AUTO: 5.9 X10(3)/MCL (ref 2.1–9.2)
NEUTROPHILS # BLD AUTO: 8.5 X10(3)/MCL (ref 2.1–9.2)
NEUTROPHILS # BLD AUTO: 8.6 X10(3)/MCL (ref 2.1–9.2)
NEUTROPHILS # BLD AUTO: 8.7 X10(3)/MCL (ref 2.1–9.2)
NEUTROPHILS # BLD AUTO: 8.8 X10(3)/MCL (ref 2.1–9.2)
NEUTROPHILS NFR BLD AUTO: 36.3 %
NEUTROPHILS NFR BLD AUTO: 53.5 %
NEUTROPHILS NFR BLD AUTO: 63.1 %
NEUTROPHILS NFR BLD AUTO: 65.9 %
NEUTROPHILS NFR BLD AUTO: 66.2 %
NEUTROPHILS NFR BLD AUTO: 71.5 %
NEUTROPHILS NFR BLD AUTO: 73.1 %
NEUTROPHILS NFR BLD AUTO: 75 %
NEUTROPHILS NFR BLD AUTO: 77.1 %
NEUTROPHILS NFR BLD AUTO: 80.1 %
NEUTROPHILS NFR BLD AUTO: 80.3 %
NEUTROPHILS NFR BLD AUTO: 81.5 %
NEUTROPHILS NFR BLD AUTO: 82.2 %
NEUTROPHILS NFR BLD MANUAL: 28 % (ref 47–80)
NITRITE UR QL STRIP.AUTO: NEGATIVE
NRBC BLD AUTO-RTO: 0 %
OHS CV CAROTID RIGHT ICA EDV HIGHEST: 18
OHS CV CAROTID RIGHT ICA EDV HIGHEST: 24
OHS CV CAROTID ULTRASOUND LEFT ICA/CCA RATIO: 1.53
OHS CV CAROTID ULTRASOUND LEFT ICA/CCA RATIO: 2.82
OHS CV CAROTID ULTRASOUND RIGHT ICA/CCA RATIO: 2.55
OHS CV CAROTID ULTRASOUND RIGHT ICA/CCA RATIO: 3.19
OHS CV PV CAROTID LEFT HIGHEST CCA: 125
OHS CV PV CAROTID LEFT HIGHEST CCA: 74
OHS CV PV CAROTID LEFT HIGHEST ICA: 191
OHS CV PV CAROTID LEFT HIGHEST ICA: 209
OHS CV PV CAROTID RIGHT HIGHEST CCA: 27
OHS CV PV CAROTID RIGHT HIGHEST CCA: 41
OHS CV PV CAROTID RIGHT HIGHEST ICA: 84
OHS CV PV CAROTID RIGHT HIGHEST ICA: 86
OHS CV US CAROTID LEFT HIGHEST EDV: 43
PCO2 BLDA: 31 MMHG (ref 35–45)
PH SMN: 7.36 [PH] (ref 7.35–7.45)
PH UR STRIP.AUTO: 6 [PH]
PH, POC UA: 6.5
PHOSPHATE SERPL-MCNC: 3.3 MG/DL (ref 2.3–4.7)
PLATELET # BLD AUTO: 153 X10(3)/MCL (ref 130–400)
PLATELET # BLD AUTO: 159 X10(3)/MCL (ref 130–400)
PLATELET # BLD AUTO: 161 X10(3)/MCL (ref 130–400)
PLATELET # BLD AUTO: 163 X10(3)/MCL (ref 130–400)
PLATELET # BLD AUTO: 208 X10(3)/MCL (ref 130–400)
PLATELET # BLD AUTO: 222 X10(3)/MCL (ref 130–400)
PLATELET # BLD AUTO: 235 X10(3)/MCL (ref 130–400)
PLATELET # BLD AUTO: 275 X10(3)/MCL (ref 130–400)
PLATELET # BLD AUTO: 286 X10(3)/MCL (ref 130–400)
PLATELET # BLD AUTO: 294 X10(3)/MCL (ref 130–400)
PLATELET # BLD AUTO: 296 X10(3)/MCL (ref 130–400)
PLATELET # BLD AUTO: 308 X10(3)/MCL (ref 130–400)
PLATELET # BLD AUTO: 310 X10(3)/MCL (ref 130–400)
PLATELET # BLD AUTO: 366 X10(3)/MCL (ref 130–400)
PLATELET # BLD AUTO: 398 X10(3)/MCL (ref 130–400)
PLATELET # BLD EST: ADEQUATE 10*3/UL
PMV BLD AUTO: 10 FL (ref 7.4–10.4)
PMV BLD AUTO: 10.2 FL (ref 7.4–10.4)
PMV BLD AUTO: 10.3 FL (ref 7.4–10.4)
PMV BLD AUTO: 10.3 FL (ref 7.4–10.4)
PMV BLD AUTO: 10.5 FL (ref 7.4–10.4)
PMV BLD AUTO: 10.5 FL (ref 7.4–10.4)
PMV BLD AUTO: 10.6 FL (ref 7.4–10.4)
PMV BLD AUTO: 11.3 FL (ref 7.4–10.4)
PMV BLD AUTO: 9.2 FL (ref 7.4–10.4)
PMV BLD AUTO: 9.4 FL (ref 7.4–10.4)
PMV BLD AUTO: 9.5 FL (ref 7.4–10.4)
PMV BLD AUTO: 9.6 FL (ref 7.4–10.4)
PMV BLD AUTO: 9.7 FL (ref 7.4–10.4)
PMV BLD AUTO: 9.8 FL (ref 7.4–10.4)
PMV BLD AUTO: 9.9 FL (ref 7.4–10.4)
PO2 BLDA: 160 MMHG (ref 80–100)
POC BASE DEFICIT: -7 MMOL/L (ref -2–2)
POC BLOOD, URINE: POSITIVE
POC COHB: 1.8 %
POC IONIZED CALCIUM: 1.1 MMOL/L (ref 1.12–1.23)
POC METHB: 0.6 % (ref 0.4–1.5)
POC NITRATES, URINE: NEGATIVE
POC O2HB: 97.3 % (ref 94–97)
POC SATURATED O2: 99.4 %
POC TEMPERATURE: 37 °C
POCT GLUCOSE: 100 MG/DL (ref 70–110)
POCT GLUCOSE: 101 MG/DL (ref 70–110)
POCT GLUCOSE: 101 MG/DL (ref 70–110)
POCT GLUCOSE: 102 MG/DL (ref 70–110)
POCT GLUCOSE: 103 MG/DL (ref 70–110)
POCT GLUCOSE: 104 MG/DL (ref 70–110)
POCT GLUCOSE: 104 MG/DL (ref 70–110)
POCT GLUCOSE: 105 MG/DL (ref 70–110)
POCT GLUCOSE: 105 MG/DL (ref 70–110)
POCT GLUCOSE: 106 MG/DL (ref 70–110)
POCT GLUCOSE: 108 MG/DL (ref 70–110)
POCT GLUCOSE: 108 MG/DL (ref 70–110)
POCT GLUCOSE: 109 MG/DL (ref 70–110)
POCT GLUCOSE: 109 MG/DL (ref 70–110)
POCT GLUCOSE: 110 MG/DL (ref 70–110)
POCT GLUCOSE: 111 MG/DL (ref 70–110)
POCT GLUCOSE: 112 MG/DL (ref 70–110)
POCT GLUCOSE: 112 MG/DL (ref 70–110)
POCT GLUCOSE: 113 MG/DL (ref 70–110)
POCT GLUCOSE: 113 MG/DL (ref 70–110)
POCT GLUCOSE: 114 MG/DL (ref 70–110)
POCT GLUCOSE: 114 MG/DL (ref 70–110)
POCT GLUCOSE: 116 MG/DL (ref 70–110)
POCT GLUCOSE: 117 MG/DL (ref 70–110)
POCT GLUCOSE: 119 MG/DL (ref 70–110)
POCT GLUCOSE: 120 MG/DL (ref 70–110)
POCT GLUCOSE: 121 MG/DL (ref 70–110)
POCT GLUCOSE: 121 MG/DL (ref 70–110)
POCT GLUCOSE: 122 MG/DL (ref 70–110)
POCT GLUCOSE: 124 MG/DL (ref 70–110)
POCT GLUCOSE: 125 MG/DL (ref 70–110)
POCT GLUCOSE: 127 MG/DL (ref 70–110)
POCT GLUCOSE: 127 MG/DL (ref 70–110)
POCT GLUCOSE: 128 MG/DL (ref 70–110)
POCT GLUCOSE: 129 MG/DL (ref 70–110)
POCT GLUCOSE: 131 MG/DL (ref 70–110)
POCT GLUCOSE: 132 MG/DL (ref 70–110)
POCT GLUCOSE: 133 MG/DL (ref 70–110)
POCT GLUCOSE: 136 MG/DL (ref 70–110)
POCT GLUCOSE: 137 MG/DL (ref 70–110)
POCT GLUCOSE: 138 MG/DL (ref 70–110)
POCT GLUCOSE: 138 MG/DL (ref 70–110)
POCT GLUCOSE: 139 MG/DL (ref 70–110)
POCT GLUCOSE: 141 MG/DL (ref 70–110)
POCT GLUCOSE: 142 MG/DL (ref 70–110)
POCT GLUCOSE: 142 MG/DL (ref 70–110)
POCT GLUCOSE: 143 MG/DL (ref 70–110)
POCT GLUCOSE: 145 MG/DL (ref 70–110)
POCT GLUCOSE: 148 MG/DL (ref 70–110)
POCT GLUCOSE: 151 MG/DL (ref 70–110)
POCT GLUCOSE: 151 MG/DL (ref 70–110)
POCT GLUCOSE: 154 MG/DL (ref 70–110)
POCT GLUCOSE: 154 MG/DL (ref 70–110)
POCT GLUCOSE: 157 MG/DL (ref 70–110)
POCT GLUCOSE: 160 MG/DL (ref 70–110)
POCT GLUCOSE: 163 MG/DL (ref 70–110)
POCT GLUCOSE: 168 MG/DL (ref 70–110)
POCT GLUCOSE: 177 MG/DL (ref 70–110)
POCT GLUCOSE: 188 MG/DL (ref 70–110)
POCT GLUCOSE: 205 MG/DL (ref 70–110)
POCT GLUCOSE: 258 MG/DL (ref 70–110)
POCT GLUCOSE: 70 MG/DL (ref 70–110)
POCT GLUCOSE: 70 MG/DL (ref 70–110)
POCT GLUCOSE: 74 MG/DL (ref 70–110)
POCT GLUCOSE: 78 MG/DL (ref 70–110)
POCT GLUCOSE: 84 MG/DL (ref 70–110)
POCT GLUCOSE: 84 MG/DL (ref 70–110)
POCT GLUCOSE: 85 MG/DL (ref 70–110)
POCT GLUCOSE: 86 MG/DL (ref 70–110)
POCT GLUCOSE: 87 MG/DL (ref 70–110)
POCT GLUCOSE: 88 MG/DL (ref 70–110)
POCT GLUCOSE: 90 MG/DL (ref 70–110)
POCT GLUCOSE: 90 MG/DL (ref 70–110)
POCT GLUCOSE: 91 MG/DL (ref 70–110)
POCT GLUCOSE: 93 MG/DL (ref 70–110)
POCT GLUCOSE: 94 MG/DL (ref 70–110)
POCT GLUCOSE: 99 MG/DL (ref 70–110)
POIKILOCYTOSIS BLD QL SMEAR: ABNORMAL
POTASSIUM BLD-SCNC: 2.8 MMOL/L (ref 3.5–5)
POTASSIUM SERPL-SCNC: 2.5 MMOL/L (ref 3.5–5.1)
POTASSIUM SERPL-SCNC: 2.7 MMOL/L (ref 3.5–5.1)
POTASSIUM SERPL-SCNC: 2.7 MMOL/L (ref 3.5–5.1)
POTASSIUM SERPL-SCNC: 2.8 MMOL/L (ref 3.5–5.1)
POTASSIUM SERPL-SCNC: 2.8 MMOL/L (ref 3.5–5.1)
POTASSIUM SERPL-SCNC: 2.9 MMOL/L (ref 3.5–5.1)
POTASSIUM SERPL-SCNC: 3 MMOL/L (ref 3.5–5.1)
POTASSIUM SERPL-SCNC: 3.1 MMOL/L (ref 3.5–5.1)
POTASSIUM SERPL-SCNC: 3.1 MMOL/L (ref 3.5–5.1)
POTASSIUM SERPL-SCNC: 3.2 MMOL/L (ref 3.5–5.1)
POTASSIUM SERPL-SCNC: 3.6 MMOL/L (ref 3.5–5.1)
POTASSIUM SERPL-SCNC: 3.7 MMOL/L (ref 3.5–5.1)
POTASSIUM SERPL-SCNC: 3.7 MMOL/L (ref 3.5–5.1)
POTASSIUM SERPL-SCNC: 3.8 MMOL/L (ref 3.5–5.1)
POTASSIUM SERPL-SCNC: 4.1 MMOL/L (ref 3.5–5.1)
POTASSIUM SERPL-SCNC: 4.2 MMOL/L (ref 3.5–5.1)
POTASSIUM SERPL-SCNC: 4.3 MMOL/L (ref 3.5–5.1)
POTASSIUM SERPL-SCNC: 4.4 MMOL/L (ref 3.5–5.1)
PROT SERPL-MCNC: 5.9 GM/DL (ref 5.8–7.6)
PROT SERPL-MCNC: 6.1 GM/DL (ref 5.8–7.6)
PROT SERPL-MCNC: 6.5 GM/DL (ref 5.8–7.6)
PROT SERPL-MCNC: 6.6 GM/DL (ref 5.8–7.6)
PROT SERPL-MCNC: 7 GM/DL (ref 5.8–7.6)
PROT SERPL-MCNC: 7.2 GM/DL (ref 5.8–7.6)
PROT SERPL-MCNC: 7.5 GM/DL (ref 5.8–7.6)
PROT SERPL-MCNC: 7.6 GM/DL (ref 5.8–7.6)
PROT SERPL-MCNC: 7.9 GM/DL (ref 5.8–7.6)
PROT SERPL-MCNC: 8.1 GM/DL (ref 5.8–7.6)
PROT UR QL STRIP.AUTO: NEGATIVE MG/DL
PROT UR QL STRIP: POSITIVE
PROTHROMBIN TIME: 16.3 SECONDS (ref 11.7–14.5)
RBC # BLD AUTO: 3.32 X10(6)/MCL (ref 4.2–5.4)
RBC # BLD AUTO: 3.36 X10(6)/MCL (ref 4.2–5.4)
RBC # BLD AUTO: 3.37 X10(6)/MCL (ref 4.2–5.4)
RBC # BLD AUTO: 3.37 X10(6)/MCL (ref 4.2–5.4)
RBC # BLD AUTO: 3.47 X10(6)/MCL (ref 4.2–5.4)
RBC # BLD AUTO: 3.64 X10(6)/MCL (ref 4.2–5.4)
RBC # BLD AUTO: 3.73 X10(6)/MCL (ref 4.2–5.4)
RBC # BLD AUTO: 3.73 X10(6)/MCL (ref 4.2–5.4)
RBC # BLD AUTO: 3.85 X10(6)/MCL (ref 4.2–5.4)
RBC # BLD AUTO: 3.89 X10(6)/MCL (ref 4.2–5.4)
RBC # BLD AUTO: 4.12 X10(6)/MCL (ref 4.2–5.4)
RBC # BLD AUTO: 4.16 X10(6)/MCL (ref 4.2–5.4)
RBC # BLD AUTO: 4.34 X10(6)/MCL (ref 4.2–5.4)
RBC # BLD AUTO: 4.41 X10(6)/MCL (ref 4.2–5.4)
RBC # BLD AUTO: 4.62 X10(6)/MCL (ref 4.2–5.4)
RBC #/AREA URNS AUTO: ABNORMAL /HPF
RBC MORPH BLD: ABNORMAL
RBC UR QL AUTO: ABNORMAL UNIT/L
RIGHT CCA DIST DIAS: 5 CM/S
RIGHT CCA DIST DIAS: 7 CM/S
RIGHT CCA DIST SYS: 27 CM/S
RIGHT CCA DIST SYS: 33 CM/S
RIGHT CCA PROX DIAS: 4 CM/S
RIGHT CCA PROX SYS: 15 CM/S
RIGHT CCA PROX SYS: 41 CM/S
RIGHT ECA DIAS: 0 CM/S
RIGHT ECA DIAS: 28 CM/S
RIGHT ECA SYS: 111 CM/S
RIGHT ECA SYS: 122 CM/S
RIGHT ICA DIST DIAS: 18 CM/S
RIGHT ICA DIST DIAS: 24 CM/S
RIGHT ICA DIST SYS: 84 CM/S
RIGHT ICA DIST SYS: 86 CM/S
RIGHT ICA MID DIAS: 11 CM/S
RIGHT ICA MID DIAS: 14 CM/S
RIGHT ICA MID SYS: 39 CM/S
RIGHT ICA MID SYS: 76 CM/S
RIGHT ICA PROX DIAS: 7 CM/S
RIGHT ICA PROX DIAS: 7 CM/S
RIGHT ICA PROX SYS: 26 CM/S
RIGHT ICA PROX SYS: 48 CM/S
SARS-COV-2 RDRP RESP QL NAA+PROBE: NEGATIVE
SARS-COV-2 RDRP RESP QL NAA+PROBE: NEGATIVE
SODIUM BLD-SCNC: 143 MMOL/L (ref 137–145)
SODIUM SERPL-SCNC: 134 MMOL/L (ref 136–145)
SODIUM SERPL-SCNC: 135 MMOL/L (ref 136–145)
SODIUM SERPL-SCNC: 135 MMOL/L (ref 136–145)
SODIUM SERPL-SCNC: 136 MMOL/L (ref 136–145)
SODIUM SERPL-SCNC: 136 MMOL/L (ref 136–145)
SODIUM SERPL-SCNC: 137 MMOL/L (ref 136–145)
SODIUM SERPL-SCNC: 138 MMOL/L (ref 136–145)
SODIUM SERPL-SCNC: 139 MMOL/L (ref 136–145)
SODIUM SERPL-SCNC: 140 MMOL/L (ref 136–145)
SODIUM SERPL-SCNC: 140 MMOL/L (ref 136–145)
SODIUM SERPL-SCNC: 141 MMOL/L (ref 136–145)
SODIUM SERPL-SCNC: 141 MMOL/L (ref 136–145)
SODIUM SERPL-SCNC: 143 MMOL/L (ref 136–145)
SODIUM SERPL-SCNC: 144 MMOL/L (ref 136–145)
SODIUM SERPL-SCNC: 144 MMOL/L (ref 136–145)
SODIUM SERPL-SCNC: 145 MMOL/L (ref 136–145)
SODIUM SERPL-SCNC: 145 MMOL/L (ref 136–145)
SODIUM SERPL-SCNC: 147 MMOL/L (ref 136–145)
SP GR UR STRIP.AUTO: 1.02
SP GR UR STRIP: 1.01 (ref 1–1.03)
SPECIMEN SOURCE: ABNORMAL
SQUAMOUS #/AREA URNS AUTO: ABNORMAL /HPF
T3RU NFR SERPL: NORMAL %
TRIGL SERPL-MCNC: 62 MG/DL (ref 37–140)
TROPONIN I SERPL-MCNC: 0.02 NG/ML (ref 0–0.04)
UROBILINOGEN UR STRIP-ACNC: 0.2 (ref 0.1–1.1)
UROBILINOGEN UR STRIP-ACNC: 0.2 MG/DL
VLDLC SERPL CALC-MCNC: 12 MG/DL
WBC # SPEC AUTO: 10.7 X10(3)/MCL (ref 4.5–11.5)
WBC # SPEC AUTO: 11.4 X10(3)/MCL (ref 4.5–11.5)
WBC # SPEC AUTO: 11.6 X10(3)/MCL (ref 4.5–11.5)
WBC # SPEC AUTO: 11.6 X10(3)/MCL (ref 4.5–11.5)
WBC # SPEC AUTO: 13.7 X10(3)/MCL (ref 4.5–11.5)
WBC # SPEC AUTO: 14.2 X10(3)/MCL (ref 4.5–11.5)
WBC # SPEC AUTO: 14.4 X10(3)/MCL (ref 4.5–11.5)
WBC # SPEC AUTO: 17.1 X10(3)/MCL (ref 4.5–11.5)
WBC # SPEC AUTO: 4.7 X10(3)/MCL (ref 4.5–11.5)
WBC # SPEC AUTO: 4.7 X10(3)/MCL (ref 4.5–11.5)
WBC # SPEC AUTO: 6.6 X10(3)/MCL (ref 4.5–11.5)
WBC # SPEC AUTO: 8.2 X10(3)/MCL (ref 4.5–11.5)
WBC # SPEC AUTO: 8.2 X10(3)/MCL (ref 4.5–11.5)
WBC # SPEC AUTO: 8.3 X10(3)/MCL (ref 4.5–11.5)
WBC # SPEC AUTO: 8.8 X10(3)/MCL (ref 4.5–11.5)
WBC #/AREA URNS AUTO: ABNORMAL /HPF

## 2022-01-01 PROCEDURE — 36415 COLL VENOUS BLD VENIPUNCTURE: CPT | Performed by: INTERNAL MEDICINE

## 2022-01-01 PROCEDURE — 85025 COMPLETE CBC W/AUTO DIFF WBC: CPT | Performed by: INTERNAL MEDICINE

## 2022-01-01 PROCEDURE — S4991 NICOTINE PATCH NONLEGEND: HCPCS | Performed by: INTERNAL MEDICINE

## 2022-01-01 PROCEDURE — 97166 OT EVAL MOD COMPLEX 45 MIN: CPT

## 2022-01-01 PROCEDURE — 99497 PR ADVNCD CARE PLAN 30 MIN: ICD-10-PCS | Mod: ,,, | Performed by: NURSE PRACTITIONER

## 2022-01-01 PROCEDURE — 80053 COMPREHEN METABOLIC PANEL: CPT | Performed by: INTERNAL MEDICINE

## 2022-01-01 PROCEDURE — 21400001 HC TELEMETRY ROOM

## 2022-01-01 PROCEDURE — G0426 PR INPT TELEHEALTH CONSULT 50M: ICD-10-PCS | Mod: GT,,, | Performed by: PSYCHIATRY & NEUROLOGY

## 2022-01-01 PROCEDURE — 63600175 PHARM REV CODE 636 W HCPCS: Performed by: INTERNAL MEDICINE

## 2022-01-01 PROCEDURE — 25000003 PHARM REV CODE 250: Performed by: INTERNAL MEDICINE

## 2022-01-01 PROCEDURE — 99233 PR SUBSEQUENT HOSPITAL CARE,LEVL III: ICD-10-PCS | Mod: ,,, | Performed by: NURSE PRACTITIONER

## 2022-01-01 PROCEDURE — 99497 ADVNCD CARE PLAN 30 MIN: CPT | Mod: ,,, | Performed by: INTERNAL MEDICINE

## 2022-01-01 PROCEDURE — 80048 BASIC METABOLIC PNL TOTAL CA: CPT | Performed by: INTERNAL MEDICINE

## 2022-01-01 PROCEDURE — 85610 PROTHROMBIN TIME: CPT | Performed by: INTERNAL MEDICINE

## 2022-01-01 PROCEDURE — 95816 PR EEG,W/AWAKE & DROWSY RECORD: ICD-10-PCS | Mod: 26,,, | Performed by: PSYCHIATRY & NEUROLOGY

## 2022-01-01 PROCEDURE — S5010 5% DEXTROSE AND 0.45% SALINE: HCPCS | Performed by: INTERNAL MEDICINE

## 2022-01-01 PROCEDURE — 99284 EMERGENCY DEPT VISIT MOD MDM: CPT | Mod: 25

## 2022-01-01 PROCEDURE — 99233 PR SUBSEQUENT HOSPITAL CARE,LEVL III: ICD-10-PCS | Mod: ,,, | Performed by: INTERNAL MEDICINE

## 2022-01-01 PROCEDURE — 99497 ADVNCD CARE PLAN 30 MIN: CPT | Mod: ,,, | Performed by: NURSE PRACTITIONER

## 2022-01-01 PROCEDURE — 11000001 HC ACUTE MED/SURG PRIVATE ROOM

## 2022-01-01 PROCEDURE — 99214 OFFICE O/P EST MOD 30 MIN: CPT | Mod: PBBFAC | Performed by: NURSE PRACTITIONER

## 2022-01-01 PROCEDURE — 99222 1ST HOSP IP/OBS MODERATE 55: CPT | Mod: ,,, | Performed by: INTERNAL MEDICINE

## 2022-01-01 PROCEDURE — 97530 THERAPEUTIC ACTIVITIES: CPT | Mod: CQ

## 2022-01-01 PROCEDURE — 99497 PR ADVNCD CARE PLAN 30 MIN: ICD-10-PCS | Mod: ,,, | Performed by: INTERNAL MEDICINE

## 2022-01-01 PROCEDURE — 87635 SARS-COV-2 COVID-19 AMP PRB: CPT | Performed by: INTERNAL MEDICINE

## 2022-01-01 PROCEDURE — A4216 STERILE WATER/SALINE, 10 ML: HCPCS | Performed by: INTERNAL MEDICINE

## 2022-01-01 PROCEDURE — S0030 INJECTION, METRONIDAZOLE: HCPCS | Performed by: INTERNAL MEDICINE

## 2022-01-01 PROCEDURE — 97168 OT RE-EVAL EST PLAN CARE: CPT

## 2022-01-01 PROCEDURE — 92610 EVALUATE SWALLOWING FUNCTION: CPT

## 2022-01-01 PROCEDURE — 84484 ASSAY OF TROPONIN QUANT: CPT | Performed by: INTERNAL MEDICINE

## 2022-01-01 PROCEDURE — 83036 HEMOGLOBIN GLYCOSYLATED A1C: CPT | Performed by: INTERNAL MEDICINE

## 2022-01-01 PROCEDURE — 63600175 PHARM REV CODE 636 W HCPCS

## 2022-01-01 PROCEDURE — 27000221 HC OXYGEN, UP TO 24 HOURS

## 2022-01-01 PROCEDURE — 63600175 PHARM REV CODE 636 W HCPCS: Performed by: NURSE PRACTITIONER

## 2022-01-01 PROCEDURE — 99233 SBSQ HOSP IP/OBS HIGH 50: CPT | Mod: ,,, | Performed by: INTERNAL MEDICINE

## 2022-01-01 PROCEDURE — 93010 ELECTROCARDIOGRAM REPORT: CPT | Mod: ,,, | Performed by: INTERNAL MEDICINE

## 2022-01-01 PROCEDURE — 25000003 PHARM REV CODE 250: Performed by: NURSE ANESTHETIST, CERTIFIED REGISTERED

## 2022-01-01 PROCEDURE — 83880 ASSAY OF NATRIURETIC PEPTIDE: CPT | Performed by: INTERNAL MEDICINE

## 2022-01-01 PROCEDURE — G0426 INPT/ED TELECONSULT50: HCPCS | Mod: GT,,, | Performed by: PSYCHIATRY & NEUROLOGY

## 2022-01-01 PROCEDURE — 97164 PT RE-EVAL EST PLAN CARE: CPT

## 2022-01-01 PROCEDURE — 95819 EEG AWAKE AND ASLEEP: CPT

## 2022-01-01 PROCEDURE — 99223 PR INITIAL HOSPITAL CARE,LEVL III: ICD-10-PCS | Mod: ,,, | Performed by: PSYCHIATRY & NEUROLOGY

## 2022-01-01 PROCEDURE — 43246 EGD PLACE GASTROSTOMY TUBE: CPT | Performed by: INTERNAL MEDICINE

## 2022-01-01 PROCEDURE — 70450 CT HEAD/BRAIN W/O DYE: CPT | Mod: TC

## 2022-01-01 PROCEDURE — 83735 ASSAY OF MAGNESIUM: CPT | Performed by: INTERNAL MEDICINE

## 2022-01-01 PROCEDURE — 99222 PR INITIAL HOSPITAL CARE,LEVL II: ICD-10-PCS | Mod: ,,, | Performed by: INTERNAL MEDICINE

## 2022-01-01 PROCEDURE — 25000003 PHARM REV CODE 250: Performed by: NURSE PRACTITIONER

## 2022-01-01 PROCEDURE — 36600 WITHDRAWAL OF ARTERIAL BLOOD: CPT

## 2022-01-01 PROCEDURE — 76937 US GUIDE VASCULAR ACCESS: CPT

## 2022-01-01 PROCEDURE — 87040 BLOOD CULTURE FOR BACTERIA: CPT | Performed by: INTERNAL MEDICINE

## 2022-01-01 PROCEDURE — 97530 THERAPEUTIC ACTIVITIES: CPT | Mod: CO

## 2022-01-01 PROCEDURE — 93010 EKG 12-LEAD: ICD-10-PCS | Mod: ,,, | Performed by: INTERNAL MEDICINE

## 2022-01-01 PROCEDURE — 99232 SBSQ HOSP IP/OBS MODERATE 35: CPT | Mod: ,,, | Performed by: SPECIALIST

## 2022-01-01 PROCEDURE — 97530 THERAPEUTIC ACTIVITIES: CPT

## 2022-01-01 PROCEDURE — 94761 N-INVAS EAR/PLS OXIMETRY MLT: CPT

## 2022-01-01 PROCEDURE — 99203 PR OFFICE/OUTPT VISIT, NEW, LEVL III, 30-44 MIN: ICD-10-PCS | Mod: S$PBB,,, | Performed by: FAMILY MEDICINE

## 2022-01-01 PROCEDURE — 81003 URINALYSIS AUTO W/O SCOPE: CPT | Mod: PBBFAC | Performed by: FAMILY MEDICINE

## 2022-01-01 PROCEDURE — 37000008 HC ANESTHESIA 1ST 15 MINUTES: Performed by: INTERNAL MEDICINE

## 2022-01-01 PROCEDURE — 93005 ELECTROCARDIOGRAM TRACING: CPT

## 2022-01-01 PROCEDURE — 99232 PR SUBSEQUENT HOSPITAL CARE,LEVL II: ICD-10-PCS | Mod: ,,, | Performed by: SPECIALIST

## 2022-01-01 PROCEDURE — 99900035 HC TECH TIME PER 15 MIN (STAT)

## 2022-01-01 PROCEDURE — 99203 OFFICE O/P NEW LOW 30 MIN: CPT | Mod: S$PBB,,, | Performed by: FAMILY MEDICINE

## 2022-01-01 PROCEDURE — 95816 EEG AWAKE AND DROWSY: CPT | Mod: 26,,, | Performed by: PSYCHIATRY & NEUROLOGY

## 2022-01-01 PROCEDURE — 99214 OFFICE O/P EST MOD 30 MIN: CPT | Mod: S$PBB,,, | Performed by: NURSE PRACTITIONER

## 2022-01-01 PROCEDURE — A9698 NON-RAD CONTRAST MATERIALNOC: HCPCS | Performed by: INTERNAL MEDICINE

## 2022-01-01 PROCEDURE — 99223 1ST HOSP IP/OBS HIGH 75: CPT | Mod: ,,, | Performed by: PSYCHIATRY & NEUROLOGY

## 2022-01-01 PROCEDURE — 99999 PR PBB SHADOW E&M-EST. PATIENT-LVL IV: ICD-10-PCS | Mod: PBBFAC,,, | Performed by: NURSE PRACTITIONER

## 2022-01-01 PROCEDURE — 92611 MOTION FLUOROSCOPY/SWALLOW: CPT

## 2022-01-01 PROCEDURE — 43239 EGD BIOPSY SINGLE/MULTIPLE: CPT | Performed by: INTERNAL MEDICINE

## 2022-01-01 PROCEDURE — 36410 VNPNXR 3YR/> PHY/QHP DX/THER: CPT

## 2022-01-01 PROCEDURE — 25500020 PHARM REV CODE 255: Performed by: INTERNAL MEDICINE

## 2022-01-01 PROCEDURE — 85027 COMPLETE CBC AUTOMATED: CPT | Performed by: INTERNAL MEDICINE

## 2022-01-01 PROCEDURE — 36415 COLL VENOUS BLD VENIPUNCTURE: CPT

## 2022-01-01 PROCEDURE — 88305 TISSUE EXAM BY PATHOLOGIST: CPT | Performed by: INTERNAL MEDICINE

## 2022-01-01 PROCEDURE — 85730 THROMBOPLASTIN TIME PARTIAL: CPT | Performed by: INTERNAL MEDICINE

## 2022-01-01 PROCEDURE — 87088 URINE BACTERIA CULTURE: CPT | Performed by: FAMILY MEDICINE

## 2022-01-01 PROCEDURE — 97535 SELF CARE MNGMENT TRAINING: CPT

## 2022-01-01 PROCEDURE — 80053 COMPREHEN METABOLIC PANEL: CPT

## 2022-01-01 PROCEDURE — 80061 LIPID PANEL: CPT | Performed by: INTERNAL MEDICINE

## 2022-01-01 PROCEDURE — 74230 X-RAY XM SWLNG FUNCJ C+: CPT | Mod: TC

## 2022-01-01 PROCEDURE — C1751 CATH, INF, PER/CENT/MIDLINE: HCPCS

## 2022-01-01 PROCEDURE — 97163 PT EVAL HIGH COMPLEX 45 MIN: CPT

## 2022-01-01 PROCEDURE — 99285 EMERGENCY DEPT VISIT HI MDM: CPT | Mod: 25

## 2022-01-01 PROCEDURE — 82803 BLOOD GASES ANY COMBINATION: CPT

## 2022-01-01 PROCEDURE — 37000009 HC ANESTHESIA EA ADD 15 MINS: Performed by: INTERNAL MEDICINE

## 2022-01-01 PROCEDURE — 27201423 OPTIME MED/SURG SUP & DEVICES STERILE SUPPLY: Performed by: INTERNAL MEDICINE

## 2022-01-01 PROCEDURE — 99233 SBSQ HOSP IP/OBS HIGH 50: CPT | Mod: ,,, | Performed by: NURSE PRACTITIONER

## 2022-01-01 PROCEDURE — 99999 PR PBB SHADOW E&M-EST. PATIENT-LVL IV: CPT | Mod: PBBFAC,,, | Performed by: NURSE PRACTITIONER

## 2022-01-01 PROCEDURE — 63600175 PHARM REV CODE 636 W HCPCS: Performed by: NURSE ANESTHETIST, CERTIFIED REGISTERED

## 2022-01-01 PROCEDURE — 81001 URINALYSIS AUTO W/SCOPE: CPT | Performed by: INTERNAL MEDICINE

## 2022-01-01 PROCEDURE — 85025 COMPLETE CBC W/AUTO DIFF WBC: CPT

## 2022-01-01 PROCEDURE — 97110 THERAPEUTIC EXERCISES: CPT | Mod: CQ

## 2022-01-01 PROCEDURE — 99214 PR OFFICE/OUTPT VISIT, EST, LEVL IV, 30-39 MIN: ICD-10-PCS | Mod: S$PBB,,, | Performed by: NURSE PRACTITIONER

## 2022-01-01 PROCEDURE — 92526 ORAL FUNCTION THERAPY: CPT

## 2022-01-01 PROCEDURE — 84100 ASSAY OF PHOSPHORUS: CPT | Performed by: INTERNAL MEDICINE

## 2022-01-01 PROCEDURE — 99215 OFFICE O/P EST HI 40 MIN: CPT | Mod: PBBFAC | Performed by: FAMILY MEDICINE

## 2022-01-01 RX ORDER — ISOSORBIDE MONONITRATE 60 MG/1
120 TABLET, EXTENDED RELEASE ORAL DAILY
Status: ON HOLD | COMMUNITY
Start: 2022-01-01 | End: 2022-01-01 | Stop reason: HOSPADM

## 2022-01-01 RX ORDER — SODIUM CHLORIDE 0.9 % (FLUSH) 0.9 %
10 SYRINGE (ML) INJECTION
Status: DISCONTINUED | OUTPATIENT
Start: 2022-01-01 | End: 2022-01-01 | Stop reason: HOSPADM

## 2022-01-01 RX ORDER — HYDRALAZINE HYDROCHLORIDE 25 MG/1
75 TABLET, FILM COATED ORAL EVERY 8 HOURS
Qty: 270 TABLET | Refills: 11 | Status: ON HOLD | OUTPATIENT
Start: 2022-01-01 | End: 2022-01-01

## 2022-01-01 RX ORDER — CLONIDINE 0.1 MG/24H
1 PATCH, EXTENDED RELEASE TRANSDERMAL
Status: DISCONTINUED | OUTPATIENT
Start: 2022-01-01 | End: 2022-01-01 | Stop reason: HOSPADM

## 2022-01-01 RX ORDER — FAMOTIDINE 20 MG/1
20 TABLET, FILM COATED ORAL DAILY
Status: DISCONTINUED | OUTPATIENT
Start: 2022-01-01 | End: 2022-01-01 | Stop reason: HOSPADM

## 2022-01-01 RX ORDER — LEVETIRACETAM 500 MG/1
500 TABLET ORAL 2 TIMES DAILY
Status: DISCONTINUED | OUTPATIENT
Start: 2022-01-01 | End: 2022-01-01

## 2022-01-01 RX ORDER — ATORVASTATIN CALCIUM 20 MG/1
1 TABLET, FILM COATED ORAL DAILY
Status: ON HOLD | COMMUNITY
Start: 2022-01-01 | End: 2022-01-01 | Stop reason: HOSPADM

## 2022-01-01 RX ORDER — LIDOCAINE HYDROCHLORIDE 20 MG/ML
INJECTION, SOLUTION EPIDURAL; INFILTRATION; INTRACAUDAL; PERINEURAL
Status: DISPENSED
Start: 2022-01-01 | End: 2022-01-01

## 2022-01-01 RX ORDER — HYDRALAZINE HYDROCHLORIDE 50 MG/1
50 TABLET, FILM COATED ORAL 2 TIMES DAILY
Status: DISCONTINUED | OUTPATIENT
Start: 2022-01-01 | End: 2022-01-01

## 2022-01-01 RX ORDER — SIMETHICONE 80 MG
1 TABLET,CHEWABLE ORAL 4 TIMES DAILY PRN
Status: DISCONTINUED | OUTPATIENT
Start: 2022-01-01 | End: 2022-01-01 | Stop reason: HOSPADM

## 2022-01-01 RX ORDER — LORAZEPAM 2 MG/ML
1 INJECTION INTRAMUSCULAR ONCE
Status: DISCONTINUED | OUTPATIENT
Start: 2022-01-01 | End: 2022-01-01

## 2022-01-01 RX ORDER — DEXTROSE MONOHYDRATE AND SODIUM CHLORIDE 5; .45 G/100ML; G/100ML
INJECTION, SOLUTION INTRAVENOUS CONTINUOUS
Status: ACTIVE | OUTPATIENT
Start: 2022-01-01 | End: 2022-01-01

## 2022-01-01 RX ORDER — DILTIAZEM HYDROCHLORIDE 120 MG/1
120 CAPSULE, COATED, EXTENDED RELEASE ORAL DAILY
Status: DISCONTINUED | OUTPATIENT
Start: 2022-01-01 | End: 2022-01-01 | Stop reason: HOSPADM

## 2022-01-01 RX ORDER — ACETAMINOPHEN 500 MG
1000 TABLET ORAL EVERY 6 HOURS PRN
Status: DISCONTINUED | OUTPATIENT
Start: 2022-01-01 | End: 2022-01-01 | Stop reason: HOSPADM

## 2022-01-01 RX ORDER — ACETAMINOPHEN 325 MG/1
650 TABLET ORAL EVERY 4 HOURS PRN
Status: DISCONTINUED | OUTPATIENT
Start: 2022-01-01 | End: 2022-01-01 | Stop reason: HOSPADM

## 2022-01-01 RX ORDER — METOCLOPRAMIDE HYDROCHLORIDE 5 MG/ML
10 INJECTION INTRAMUSCULAR; INTRAVENOUS EVERY 6 HOURS
Status: DISCONTINUED | OUTPATIENT
Start: 2022-01-01 | End: 2022-01-01 | Stop reason: HOSPADM

## 2022-01-01 RX ORDER — POTASSIUM CHLORIDE 14.9 MG/ML
40 INJECTION INTRAVENOUS ONCE
Status: COMPLETED | OUTPATIENT
Start: 2022-01-01 | End: 2022-01-01

## 2022-01-01 RX ORDER — METOPROLOL SUCCINATE 50 MG/1
1 TABLET, EXTENDED RELEASE ORAL DAILY
Status: ON HOLD | COMMUNITY
Start: 2022-01-01 | End: 2022-01-01 | Stop reason: HOSPADM

## 2022-01-01 RX ORDER — POTASSIUM CHLORIDE 7.45 MG/ML
10 INJECTION INTRAVENOUS
Status: DISCONTINUED | OUTPATIENT
Start: 2022-01-01 | End: 2022-01-01

## 2022-01-01 RX ORDER — ISOSORBIDE MONONITRATE 30 MG/1
120 TABLET, EXTENDED RELEASE ORAL DAILY
Status: DISCONTINUED | OUTPATIENT
Start: 2022-01-01 | End: 2022-01-01 | Stop reason: HOSPADM

## 2022-01-01 RX ORDER — HALOPERIDOL 5 MG/ML
5 INJECTION INTRAMUSCULAR EVERY 6 HOURS PRN
Status: DISCONTINUED | OUTPATIENT
Start: 2022-01-01 | End: 2022-01-01

## 2022-01-01 RX ORDER — LEVETIRACETAM 100 MG/ML
500 SOLUTION ORAL EVERY 12 HOURS
Qty: 300 ML | Refills: 11 | Status: ON HOLD | OUTPATIENT
Start: 2022-01-01 | End: 2022-01-01

## 2022-01-01 RX ORDER — ATORVASTATIN CALCIUM 40 MG/1
40 TABLET, FILM COATED ORAL DAILY
Status: DISCONTINUED | OUTPATIENT
Start: 2022-01-01 | End: 2022-01-01 | Stop reason: HOSPADM

## 2022-01-01 RX ORDER — LABETALOL HYDROCHLORIDE 5 MG/ML
10 INJECTION, SOLUTION INTRAVENOUS EVERY 4 HOURS PRN
Status: DISCONTINUED | OUTPATIENT
Start: 2022-01-01 | End: 2022-01-01 | Stop reason: HOSPADM

## 2022-01-01 RX ORDER — HALOPERIDOL 5 MG/ML
2 INJECTION INTRAMUSCULAR ONCE
Status: COMPLETED | OUTPATIENT
Start: 2022-01-01 | End: 2022-01-01

## 2022-01-01 RX ORDER — DIPHENHYDRAMINE HYDROCHLORIDE 50 MG/ML
25 INJECTION INTRAMUSCULAR; INTRAVENOUS EVERY 6 HOURS PRN
Status: CANCELLED | OUTPATIENT
Start: 2022-01-01

## 2022-01-01 RX ORDER — AMOXICILLIN 250 MG
1 CAPSULE ORAL DAILY
Status: ON HOLD | COMMUNITY
End: 2022-01-01

## 2022-01-01 RX ORDER — ONDANSETRON 2 MG/ML
4 INJECTION INTRAMUSCULAR; INTRAVENOUS EVERY 4 HOURS PRN
Status: DISCONTINUED | OUTPATIENT
Start: 2022-01-01 | End: 2022-01-01 | Stop reason: HOSPADM

## 2022-01-01 RX ORDER — DEXTROSE MONOHYDRATE AND SODIUM CHLORIDE 5; .45 G/100ML; G/100ML
INJECTION, SOLUTION INTRAVENOUS CONTINUOUS
Status: DISCONTINUED | OUTPATIENT
Start: 2022-01-01 | End: 2022-01-01

## 2022-01-01 RX ORDER — PROPOFOL 10 MG/ML
VIAL (ML) INTRAVENOUS
Status: COMPLETED
Start: 2022-01-01 | End: 2022-01-01

## 2022-01-01 RX ORDER — HYDRALAZINE HYDROCHLORIDE 20 MG/ML
10 INJECTION INTRAMUSCULAR; INTRAVENOUS EVERY 4 HOURS PRN
Status: DISCONTINUED | OUTPATIENT
Start: 2022-01-01 | End: 2022-01-01 | Stop reason: HOSPADM

## 2022-01-01 RX ORDER — AMOXICILLIN 250 MG
1 CAPSULE ORAL 2 TIMES DAILY PRN
Status: DISCONTINUED | OUTPATIENT
Start: 2022-01-01 | End: 2022-01-01 | Stop reason: HOSPADM

## 2022-01-01 RX ORDER — LIDOCAINE HCL/PF 100 MG/5ML
SYRINGE (ML) INTRAVENOUS
Status: DISCONTINUED | OUTPATIENT
Start: 2022-01-01 | End: 2022-01-01

## 2022-01-01 RX ORDER — METOPROLOL TARTRATE 25 MG/1
25 TABLET, FILM COATED ORAL 2 TIMES DAILY
Status: DISCONTINUED | OUTPATIENT
Start: 2022-01-01 | End: 2022-01-01 | Stop reason: HOSPADM

## 2022-01-01 RX ORDER — ASPIRIN 300 MG/1
300 SUPPOSITORY RECTAL DAILY
Status: DISCONTINUED | OUTPATIENT
Start: 2022-01-01 | End: 2022-01-01

## 2022-01-01 RX ORDER — INSULIN ASPART 100 [IU]/ML
0-5 INJECTION, SOLUTION INTRAVENOUS; SUBCUTANEOUS EVERY 6 HOURS PRN
Status: DISCONTINUED | OUTPATIENT
Start: 2022-01-01 | End: 2022-01-01 | Stop reason: HOSPADM

## 2022-01-01 RX ORDER — LEVETIRACETAM 500 MG/5ML
250 INJECTION, SOLUTION, CONCENTRATE INTRAVENOUS EVERY 12 HOURS
Status: DISCONTINUED | OUTPATIENT
Start: 2022-01-01 | End: 2022-01-01

## 2022-01-01 RX ORDER — MIRTAZAPINE 15 MG/1
15 TABLET, FILM COATED ORAL NIGHTLY
Status: ON HOLD | COMMUNITY
Start: 2022-01-01 | End: 2022-01-01 | Stop reason: HOSPADM

## 2022-01-01 RX ORDER — METOPROLOL TARTRATE 25 MG/1
25 TABLET, FILM COATED ORAL 2 TIMES DAILY
Qty: 60 TABLET | Refills: 11 | Status: ON HOLD | OUTPATIENT
Start: 2022-01-01 | End: 2022-01-01

## 2022-01-01 RX ORDER — HYDRALAZINE HYDROCHLORIDE 50 MG/1
50 TABLET, FILM COATED ORAL 2 TIMES DAILY
Status: ON HOLD | COMMUNITY
Start: 2022-01-01 | End: 2022-01-01 | Stop reason: HOSPADM

## 2022-01-01 RX ORDER — LISINOPRIL 10 MG/1
10 TABLET ORAL DAILY
Status: DISCONTINUED | OUTPATIENT
Start: 2022-01-01 | End: 2022-01-01

## 2022-01-01 RX ORDER — LEVOFLOXACIN 5 MG/ML
500 INJECTION, SOLUTION INTRAVENOUS
Status: DISCONTINUED | OUTPATIENT
Start: 2022-01-01 | End: 2022-01-01

## 2022-01-01 RX ORDER — TALC
6 POWDER (GRAM) TOPICAL NIGHTLY PRN
Status: DISCONTINUED | OUTPATIENT
Start: 2022-01-01 | End: 2022-01-01 | Stop reason: HOSPADM

## 2022-01-01 RX ORDER — METRONIDAZOLE 500 MG/100ML
500 INJECTION, SOLUTION INTRAVENOUS
Status: DISCONTINUED | OUTPATIENT
Start: 2022-01-01 | End: 2022-01-01

## 2022-01-01 RX ORDER — LISINOPRIL 10 MG/1
10 TABLET ORAL DAILY
Status: ON HOLD | COMMUNITY
Start: 2022-01-01 | End: 2022-01-01 | Stop reason: HOSPADM

## 2022-01-01 RX ORDER — POTASSIUM CHLORIDE 14.9 MG/ML
20 INJECTION INTRAVENOUS ONCE
Status: COMPLETED | OUTPATIENT
Start: 2022-01-01 | End: 2022-01-01

## 2022-01-01 RX ORDER — NITROGLYCERIN 0.4 MG/1
TABLET SUBLINGUAL
Status: ON HOLD | COMMUNITY
End: 2022-01-01 | Stop reason: HOSPADM

## 2022-01-01 RX ORDER — FAMOTIDINE 20 MG/1
20 TABLET, FILM COATED ORAL DAILY
Status: ON HOLD | COMMUNITY
Start: 2022-01-01 | End: 2022-01-01

## 2022-01-01 RX ORDER — LABETALOL HYDROCHLORIDE 5 MG/ML
5 INJECTION, SOLUTION INTRAVENOUS
Status: DISCONTINUED | OUTPATIENT
Start: 2022-01-01 | End: 2022-01-01 | Stop reason: HOSPADM

## 2022-01-01 RX ORDER — ENOXAPARIN SODIUM 100 MG/ML
1 INJECTION SUBCUTANEOUS
Status: DISCONTINUED | OUTPATIENT
Start: 2022-01-01 | End: 2022-01-01

## 2022-01-01 RX ORDER — CIPROFLOXACIN 2 MG/ML
400 INJECTION, SOLUTION INTRAVENOUS
Status: DISCONTINUED | OUTPATIENT
Start: 2022-01-01 | End: 2022-01-01

## 2022-01-01 RX ORDER — POLYETHYLENE GLYCOL 3350 17 G/17G
17 POWDER, FOR SOLUTION ORAL 2 TIMES DAILY PRN
Status: DISCONTINUED | OUTPATIENT
Start: 2022-01-01 | End: 2022-01-01 | Stop reason: HOSPADM

## 2022-01-01 RX ORDER — IBUPROFEN 200 MG
1 TABLET ORAL DAILY
Status: DISCONTINUED | OUTPATIENT
Start: 2022-01-01 | End: 2022-01-01 | Stop reason: HOSPADM

## 2022-01-01 RX ORDER — CLOPIDOGREL BISULFATE 75 MG/1
75 TABLET ORAL DAILY
Status: DISCONTINUED | OUTPATIENT
Start: 2022-01-01 | End: 2022-01-01

## 2022-01-01 RX ORDER — GLYCOPYRROLATE 0.2 MG/ML
INJECTION INTRAMUSCULAR; INTRAVENOUS
Status: DISCONTINUED | OUTPATIENT
Start: 2022-01-01 | End: 2022-01-01

## 2022-01-01 RX ORDER — POTASSIUM CHLORIDE 14.9 MG/ML
20 INJECTION INTRAVENOUS ONCE
Status: DISCONTINUED | OUTPATIENT
Start: 2022-01-01 | End: 2022-01-01

## 2022-01-01 RX ORDER — LEVETIRACETAM 500 MG/1
500 TABLET ORAL 2 TIMES DAILY
COMMUNITY
Start: 2022-01-01 | End: 2022-01-01

## 2022-01-01 RX ORDER — PROCHLORPERAZINE EDISYLATE 5 MG/ML
5 INJECTION INTRAMUSCULAR; INTRAVENOUS EVERY 6 HOURS PRN
Status: DISCONTINUED | OUTPATIENT
Start: 2022-01-01 | End: 2022-01-01 | Stop reason: HOSPADM

## 2022-01-01 RX ORDER — GLUCAGON 1 MG
1 KIT INJECTION
Status: DISCONTINUED | OUTPATIENT
Start: 2022-01-01 | End: 2022-01-01 | Stop reason: HOSPADM

## 2022-01-01 RX ORDER — METOCLOPRAMIDE HYDROCHLORIDE 5 MG/5ML
5 SOLUTION ORAL
Qty: 120 ML | Refills: 0 | Status: ON HOLD | OUTPATIENT
Start: 2022-01-01 | End: 2022-01-01

## 2022-01-01 RX ORDER — ATORVASTATIN CALCIUM 40 MG/1
40 TABLET, FILM COATED ORAL DAILY
Status: DISCONTINUED | OUTPATIENT
Start: 2022-01-01 | End: 2022-01-01

## 2022-01-01 RX ORDER — CLOPIDOGREL BISULFATE 75 MG/1
75 TABLET ORAL DAILY
Status: DISCONTINUED | OUTPATIENT
Start: 2022-01-01 | End: 2022-01-01 | Stop reason: HOSPADM

## 2022-01-01 RX ORDER — LEVETIRACETAM 100 MG/ML
250 SOLUTION ORAL 2 TIMES DAILY
Status: DISCONTINUED | OUTPATIENT
Start: 2022-01-01 | End: 2022-01-01

## 2022-01-01 RX ORDER — VANCOMYCIN HCL IN 5 % DEXTROSE 1G/250ML
1000 PLASTIC BAG, INJECTION (ML) INTRAVENOUS ONCE
Status: COMPLETED | OUTPATIENT
Start: 2022-01-01 | End: 2022-01-01

## 2022-01-01 RX ORDER — CLOPIDOGREL BISULFATE 75 MG/1
75 TABLET ORAL DAILY
Status: ON HOLD | COMMUNITY
End: 2022-01-01

## 2022-01-01 RX ORDER — MAG HYDROX/ALUMINUM HYD/SIMETH 200-200-20
30 SUSPENSION, ORAL (FINAL DOSE FORM) ORAL 4 TIMES DAILY PRN
Status: DISCONTINUED | OUTPATIENT
Start: 2022-01-01 | End: 2022-01-01 | Stop reason: HOSPADM

## 2022-01-01 RX ORDER — APIXABAN 5 MG/1
5 TABLET, FILM COATED ORAL DAILY
Status: ON HOLD | COMMUNITY
Start: 2022-01-01 | End: 2022-01-01 | Stop reason: HOSPADM

## 2022-01-01 RX ORDER — CYPROHEPTADINE HYDROCHLORIDE 4 MG/1
TABLET ORAL
Status: ON HOLD | COMMUNITY
Start: 2022-01-01 | End: 2022-01-01

## 2022-01-01 RX ORDER — SODIUM CHLORIDE 0.9 % (FLUSH) 0.9 %
10 SYRINGE (ML) INJECTION EVERY 6 HOURS
Status: DISCONTINUED | OUTPATIENT
Start: 2022-01-01 | End: 2022-01-01 | Stop reason: HOSPADM

## 2022-01-01 RX ORDER — FAMOTIDINE 20 MG/1
20 TABLET, FILM COATED ORAL DAILY
Status: DISCONTINUED | OUTPATIENT
Start: 2022-01-01 | End: 2022-01-01

## 2022-01-01 RX ORDER — TRAMADOL HYDROCHLORIDE 50 MG/1
50 TABLET ORAL 3 TIMES DAILY PRN
Status: ON HOLD | COMMUNITY
Start: 2022-01-01 | End: 2022-01-01 | Stop reason: HOSPADM

## 2022-01-01 RX ORDER — CLOPIDOGREL BISULFATE 75 MG/1
75 TABLET ORAL DAILY
COMMUNITY
Start: 2022-01-01 | End: 2022-01-01

## 2022-01-01 RX ORDER — LEVETIRACETAM 100 MG/ML
500 SOLUTION ORAL EVERY 12 HOURS
Status: DISCONTINUED | OUTPATIENT
Start: 2022-01-01 | End: 2022-01-01 | Stop reason: HOSPADM

## 2022-01-01 RX ORDER — ASPIRIN 325 MG
1 TABLET, DELAYED RELEASE (ENTERIC COATED) ORAL
Status: ON HOLD | COMMUNITY
Start: 2022-01-01 | End: 2022-01-01

## 2022-01-01 RX ORDER — MIRTAZAPINE 15 MG/1
15 TABLET, FILM COATED ORAL NIGHTLY
Status: DISCONTINUED | OUTPATIENT
Start: 2022-01-01 | End: 2022-01-01

## 2022-01-01 RX ORDER — CLONIDINE 0.1 MG/24H
1 PATCH, EXTENDED RELEASE TRANSDERMAL
Qty: 4 PATCH | Refills: 0 | Status: ON HOLD | OUTPATIENT
Start: 2022-01-01 | End: 2022-01-01

## 2022-01-01 RX ORDER — IBUPROFEN 200 MG
TABLET ORAL
Status: ON HOLD | COMMUNITY
Start: 2022-01-01 | End: 2022-01-01

## 2022-01-01 RX ORDER — MAGNESIUM SULFATE HEPTAHYDRATE 40 MG/ML
2 INJECTION, SOLUTION INTRAVENOUS ONCE
Status: COMPLETED | OUTPATIENT
Start: 2022-01-01 | End: 2022-01-01

## 2022-01-01 RX ORDER — LISINOPRIL 20 MG/1
20 TABLET ORAL DAILY
Qty: 90 TABLET | Refills: 3 | Status: ON HOLD | OUTPATIENT
Start: 2022-01-01 | End: 2022-01-01

## 2022-01-01 RX ORDER — LABETALOL HCL 20 MG/4 ML
10 SYRINGE (ML) INTRAVENOUS
Status: DISCONTINUED | OUTPATIENT
Start: 2022-01-01 | End: 2022-01-01

## 2022-01-01 RX ORDER — PHENYLEPHRINE HCL IN 0.9% NACL 1 MG/10 ML
SYRINGE (ML) INTRAVENOUS
Status: DISPENSED
Start: 2022-01-01 | End: 2022-01-01

## 2022-01-01 RX ORDER — PROPOFOL 10 MG/ML
INJECTION, EMULSION INTRAVENOUS
Status: DISCONTINUED | OUTPATIENT
Start: 2022-01-01 | End: 2022-01-01

## 2022-01-01 RX ORDER — ATORVASTATIN CALCIUM 40 MG/1
40 TABLET, FILM COATED ORAL DAILY
Qty: 90 TABLET | Refills: 3 | Status: ON HOLD | OUTPATIENT
Start: 2022-01-01 | End: 2022-01-01

## 2022-01-01 RX ORDER — LABETALOL HYDROCHLORIDE 5 MG/ML
10 INJECTION, SOLUTION INTRAVENOUS
Status: DISCONTINUED | OUTPATIENT
Start: 2022-01-01 | End: 2022-01-01

## 2022-01-01 RX ORDER — FAMOTIDINE 10 MG/ML
20 INJECTION INTRAVENOUS DAILY
Status: DISCONTINUED | OUTPATIENT
Start: 2022-01-01 | End: 2022-01-01

## 2022-01-01 RX ORDER — SODIUM CHLORIDE, SODIUM GLUCONATE, SODIUM ACETATE, POTASSIUM CHLORIDE AND MAGNESIUM CHLORIDE 30; 37; 368; 526; 502 MG/100ML; MG/100ML; MG/100ML; MG/100ML; MG/100ML
INJECTION, SOLUTION INTRAVENOUS CONTINUOUS
Status: CANCELLED | OUTPATIENT
Start: 2022-01-01 | End: 2022-11-25

## 2022-01-01 RX ORDER — METOPROLOL SUCCINATE 50 MG/1
50 TABLET, EXTENDED RELEASE ORAL DAILY
Status: DISCONTINUED | OUTPATIENT
Start: 2022-01-01 | End: 2022-01-01

## 2022-01-01 RX ORDER — MONTELUKAST SODIUM 10 MG/1
10 TABLET ORAL DAILY
Status: DISCONTINUED | OUTPATIENT
Start: 2022-01-01 | End: 2022-01-01 | Stop reason: HOSPADM

## 2022-01-01 RX ORDER — LISINOPRIL 20 MG/1
20 TABLET ORAL DAILY
Status: DISCONTINUED | OUTPATIENT
Start: 2022-01-01 | End: 2022-01-01 | Stop reason: HOSPADM

## 2022-01-01 RX ORDER — ONDANSETRON 2 MG/ML
4 INJECTION INTRAMUSCULAR; INTRAVENOUS DAILY PRN
Status: CANCELLED | OUTPATIENT
Start: 2022-01-01

## 2022-01-01 RX ORDER — LIDOCAINE HYDROCHLORIDE 10 MG/ML
1 INJECTION, SOLUTION EPIDURAL; INFILTRATION; INTRACAUDAL; PERINEURAL ONCE
Status: CANCELLED | OUTPATIENT
Start: 2022-01-01 | End: 2022-01-01

## 2022-01-01 RX ORDER — LEVETIRACETAM 250 MG/1
250 TABLET ORAL 2 TIMES DAILY
Qty: 60 TABLET | Refills: 11 | Status: ON HOLD | OUTPATIENT
Start: 2022-01-01 | End: 2022-01-01 | Stop reason: HOSPADM

## 2022-01-01 RX ORDER — ERGOCALCIFEROL 1.25 MG/1
1 CAPSULE ORAL WEEKLY
Status: ON HOLD | COMMUNITY
Start: 2022-01-01 | End: 2022-01-01 | Stop reason: HOSPADM

## 2022-01-01 RX ORDER — LEVOFLOXACIN 5 MG/ML
INJECTION, SOLUTION INTRAVENOUS
Status: DISPENSED
Start: 2022-01-01 | End: 2022-01-01

## 2022-01-01 RX ORDER — DILTIAZEM HYDROCHLORIDE 120 MG/1
1 CAPSULE, EXTENDED RELEASE ORAL DAILY
Status: ON HOLD | COMMUNITY
Start: 2022-01-01 | End: 2022-01-01

## 2022-01-01 RX ORDER — GLYCOPYRROLATE 0.2 MG/ML
INJECTION INTRAMUSCULAR; INTRAVENOUS
Status: COMPLETED
Start: 2022-01-01 | End: 2022-01-01

## 2022-01-01 RX ORDER — MONTELUKAST SODIUM 10 MG/1
1 TABLET ORAL DAILY
Status: ON HOLD | COMMUNITY
Start: 2022-01-01 | End: 2022-01-01

## 2022-01-01 RX ORDER — SAW PALMETTO 160 MG
160 CAPSULE ORAL 2 TIMES DAILY
Status: ON HOLD | COMMUNITY
End: 2022-01-01 | Stop reason: HOSPADM

## 2022-01-01 RX ADMIN — ATORVASTATIN CALCIUM 40 MG: 40 TABLET, FILM COATED ORAL at 08:10

## 2022-01-01 RX ADMIN — FAMOTIDINE 20 MG: 10 INJECTION, SOLUTION INTRAVENOUS at 10:10

## 2022-01-01 RX ADMIN — ISOSORBIDE MONONITRATE 120 MG: 60 TABLET, EXTENDED RELEASE ORAL at 08:11

## 2022-01-01 RX ADMIN — MONTELUKAST 10 MG: 10 TABLET, FILM COATED ORAL at 08:10

## 2022-01-01 RX ADMIN — POTASSIUM CHLORIDE 40 MEQ: 14.9 INJECTION, SOLUTION INTRAVENOUS at 10:10

## 2022-01-01 RX ADMIN — HYDRALAZINE HYDROCHLORIDE 75 MG: 50 TABLET, FILM COATED ORAL at 09:10

## 2022-01-01 RX ADMIN — LISINOPRIL 20 MG: 20 TABLET ORAL at 09:10

## 2022-01-01 RX ADMIN — SODIUM CHLORIDE, PRESERVATIVE FREE 10 ML: 5 INJECTION INTRAVENOUS at 12:10

## 2022-01-01 RX ADMIN — NICOTINE 1 PATCH: 21 PATCH, EXTENDED RELEASE TRANSDERMAL at 08:10

## 2022-01-01 RX ADMIN — ACETAMINOPHEN 1000 MG: 500 TABLET, FILM COATED ORAL at 11:10

## 2022-01-01 RX ADMIN — LEVETIRACETAM 250 MG: 100 INJECTION, SOLUTION INTRAVENOUS at 10:10

## 2022-01-01 RX ADMIN — METOPROLOL TARTRATE 25 MG: 25 TABLET, FILM COATED ORAL at 10:10

## 2022-01-01 RX ADMIN — HYDRALAZINE HYDROCHLORIDE 75 MG: 50 TABLET, FILM COATED ORAL at 04:10

## 2022-01-01 RX ADMIN — INSULIN ASPART 2 UNITS: 100 INJECTION, SOLUTION INTRAVENOUS; SUBCUTANEOUS at 02:10

## 2022-01-01 RX ADMIN — HYDRALAZINE HYDROCHLORIDE 10 MG: 20 INJECTION INTRAMUSCULAR; INTRAVENOUS at 10:10

## 2022-01-01 RX ADMIN — HYDRALAZINE HYDROCHLORIDE 75 MG: 50 TABLET, FILM COATED ORAL at 10:10

## 2022-01-01 RX ADMIN — DEXTROSE AND SODIUM CHLORIDE: 5; 450 INJECTION, SOLUTION INTRAVENOUS at 12:10

## 2022-01-01 RX ADMIN — METRONIDAZOLE 500 MG: 500 INJECTION, SOLUTION INTRAVENOUS at 05:10

## 2022-01-01 RX ADMIN — ENOXAPARIN SODIUM 50 MG: 80 INJECTION SUBCUTANEOUS at 05:10

## 2022-01-01 RX ADMIN — MONTELUKAST 10 MG: 10 TABLET, FILM COATED ORAL at 09:11

## 2022-01-01 RX ADMIN — MIRTAZAPINE 15 MG: 15 TABLET, FILM COATED ORAL at 10:10

## 2022-01-01 RX ADMIN — SODIUM CHLORIDE, PRESERVATIVE FREE 10 ML: 5 INJECTION INTRAVENOUS at 05:10

## 2022-01-01 RX ADMIN — SODIUM CHLORIDE, PRESERVATIVE FREE 10 ML: 5 INJECTION INTRAVENOUS at 06:10

## 2022-01-01 RX ADMIN — LEVETIRACETAM 500 MG: 100 INJECTION, SOLUTION INTRAVENOUS at 08:10

## 2022-01-01 RX ADMIN — SODIUM CHLORIDE, PRESERVATIVE FREE 10 ML: 5 INJECTION INTRAVENOUS at 06:11

## 2022-01-01 RX ADMIN — CLOPIDOGREL BISULFATE 75 MG: 75 TABLET ORAL at 01:10

## 2022-01-01 RX ADMIN — METOPROLOL SUCCINATE 50 MG: 50 TABLET, FILM COATED, EXTENDED RELEASE ORAL at 09:10

## 2022-01-01 RX ADMIN — HYDRALAZINE HYDROCHLORIDE 75 MG: 50 TABLET, FILM COATED ORAL at 05:11

## 2022-01-01 RX ADMIN — METOCLOPRAMIDE 10 MG: 5 INJECTION, SOLUTION INTRAMUSCULAR; INTRAVENOUS at 12:11

## 2022-01-01 RX ADMIN — NICOTINE 1 PATCH: 21 PATCH, EXTENDED RELEASE TRANSDERMAL at 09:10

## 2022-01-01 RX ADMIN — LEVETIRACETAM 250 MG: 100 INJECTION, SOLUTION INTRAVENOUS at 08:10

## 2022-01-01 RX ADMIN — LEVETIRACETAM 500 MG: 100 INJECTION, SOLUTION INTRAVENOUS at 09:10

## 2022-01-01 RX ADMIN — CLOPIDOGREL BISULFATE 75 MG: 75 TABLET ORAL at 09:11

## 2022-01-01 RX ADMIN — METOPROLOL TARTRATE 25 MG: 25 TABLET, FILM COATED ORAL at 09:10

## 2022-01-01 RX ADMIN — MELATONIN TAB 3 MG 6 MG: 3 TAB at 11:10

## 2022-01-01 RX ADMIN — FAMOTIDINE 20 MG: 10 INJECTION, SOLUTION INTRAVENOUS at 09:10

## 2022-01-01 RX ADMIN — LEUCINE, PHENYLALANINE, LYSINE, METHIONINE, ISOLEUCINE, VALINE, HISTIDINE, THREONINE, TRYPTOPHAN, ALANINE, GLYCINE, ARGININE, PROLINE, SERINE, TYROSINE, DEXTROSE: 311; 238; 247; 170; 255; 247; 204; 179; 77; 880; 438; 489; 289; 213; 17; 5 INJECTION INTRAVENOUS at 06:10

## 2022-01-01 RX ADMIN — METOPROLOL TARTRATE 25 MG: 25 TABLET, FILM COATED ORAL at 09:11

## 2022-01-01 RX ADMIN — METOCLOPRAMIDE 10 MG: 5 INJECTION, SOLUTION INTRAMUSCULAR; INTRAVENOUS at 05:11

## 2022-01-01 RX ADMIN — ISOSORBIDE MONONITRATE 120 MG: 60 TABLET, EXTENDED RELEASE ORAL at 10:10

## 2022-01-01 RX ADMIN — FAMOTIDINE 20 MG: 20 TABLET, FILM COATED ORAL at 09:11

## 2022-01-01 RX ADMIN — APIXABAN 2.5 MG: 2.5 TABLET, FILM COATED ORAL at 10:10

## 2022-01-01 RX ADMIN — CIPROFLOXACIN 400 MG: 2 INJECTION, SOLUTION INTRAVENOUS at 06:10

## 2022-01-01 RX ADMIN — METOCLOPRAMIDE 10 MG: 5 INJECTION, SOLUTION INTRAMUSCULAR; INTRAVENOUS at 03:10

## 2022-01-01 RX ADMIN — MONTELUKAST 10 MG: 10 TABLET, FILM COATED ORAL at 01:10

## 2022-01-01 RX ADMIN — METRONIDAZOLE 500 MG: 500 INJECTION, SOLUTION INTRAVENOUS at 07:10

## 2022-01-01 RX ADMIN — ISOSORBIDE MONONITRATE 120 MG: 60 TABLET, EXTENDED RELEASE ORAL at 08:10

## 2022-01-01 RX ADMIN — NICOTINE 1 PATCH: 21 PATCH, EXTENDED RELEASE TRANSDERMAL at 10:10

## 2022-01-01 RX ADMIN — METOPROLOL TARTRATE 25 MG: 25 TABLET, FILM COATED ORAL at 08:10

## 2022-01-01 RX ADMIN — ATORVASTATIN CALCIUM 40 MG: 40 TABLET, FILM COATED ORAL at 09:10

## 2022-01-01 RX ADMIN — LEVETIRACETAM 250 MG: 100 INJECTION, SOLUTION INTRAVENOUS at 09:10

## 2022-01-01 RX ADMIN — SODIUM CHLORIDE 500 ML: 9 INJECTION, SOLUTION INTRAVENOUS at 09:10

## 2022-01-01 RX ADMIN — HALOPERIDOL LACTATE 2 MG: 5 INJECTION, SOLUTION INTRAMUSCULAR at 01:10

## 2022-01-01 RX ADMIN — HYDRALAZINE HYDROCHLORIDE 50 MG: 50 TABLET, FILM COATED ORAL at 01:10

## 2022-01-01 RX ADMIN — LEVETIRACETAM 500 MG: 100 SOLUTION ORAL at 09:10

## 2022-01-01 RX ADMIN — HYDRALAZINE HYDROCHLORIDE 10 MG: 20 INJECTION INTRAMUSCULAR; INTRAVENOUS at 09:10

## 2022-01-01 RX ADMIN — LEVETIRACETAM 500 MG: 100 SOLUTION ORAL at 09:11

## 2022-01-01 RX ADMIN — HYDRALAZINE HYDROCHLORIDE 75 MG: 50 TABLET, FILM COATED ORAL at 02:10

## 2022-01-01 RX ADMIN — METOCLOPRAMIDE 10 MG: 5 INJECTION, SOLUTION INTRAMUSCULAR; INTRAVENOUS at 06:10

## 2022-01-01 RX ADMIN — CIPROFLOXACIN 400 MG: 2 INJECTION, SOLUTION INTRAVENOUS at 07:10

## 2022-01-01 RX ADMIN — ENOXAPARIN SODIUM 50 MG: 80 INJECTION SUBCUTANEOUS at 07:10

## 2022-01-01 RX ADMIN — METOCLOPRAMIDE 10 MG: 5 INJECTION, SOLUTION INTRAMUSCULAR; INTRAVENOUS at 12:10

## 2022-01-01 RX ADMIN — DEXTROSE AND SODIUM CHLORIDE: 5; 450 INJECTION, SOLUTION INTRAVENOUS at 01:10

## 2022-01-01 RX ADMIN — APIXABAN 5 MG: 5 TABLET, FILM COATED ORAL at 03:10

## 2022-01-01 RX ADMIN — CIPROFLOXACIN 400 MG: 2 INJECTION, SOLUTION INTRAVENOUS at 08:10

## 2022-01-01 RX ADMIN — METOCLOPRAMIDE 10 MG: 5 INJECTION, SOLUTION INTRAMUSCULAR; INTRAVENOUS at 06:11

## 2022-01-01 RX ADMIN — METRONIDAZOLE 500 MG: 500 INJECTION, SOLUTION INTRAVENOUS at 03:10

## 2022-01-01 RX ADMIN — LEVETIRACETAM 250 MG: 100 INJECTION, SOLUTION INTRAVENOUS at 12:10

## 2022-01-01 RX ADMIN — ENOXAPARIN SODIUM 50 MG: 80 INJECTION SUBCUTANEOUS at 03:10

## 2022-01-01 RX ADMIN — HYDRALAZINE HYDROCHLORIDE 50 MG: 50 TABLET, FILM COATED ORAL at 08:10

## 2022-01-01 RX ADMIN — DILTIAZEM HYDROCHLORIDE 120 MG: 120 CAPSULE, COATED, EXTENDED RELEASE ORAL at 03:10

## 2022-01-01 RX ADMIN — METOCLOPRAMIDE 10 MG: 5 INJECTION, SOLUTION INTRAMUSCULAR; INTRAVENOUS at 05:10

## 2022-01-01 RX ADMIN — HYDRALAZINE HYDROCHLORIDE 75 MG: 50 TABLET, FILM COATED ORAL at 08:10

## 2022-01-01 RX ADMIN — APIXABAN 2.5 MG: 2.5 TABLET, FILM COATED ORAL at 09:11

## 2022-01-01 RX ADMIN — HYDRALAZINE HYDROCHLORIDE 75 MG: 50 TABLET, FILM COATED ORAL at 05:10

## 2022-01-01 RX ADMIN — POTASSIUM CHLORIDE 40 MEQ: 200 INJECTION, SOLUTION INTRAVENOUS at 06:10

## 2022-01-01 RX ADMIN — CIPROFLOXACIN 400 MG: 2 INJECTION, SOLUTION INTRAVENOUS at 09:10

## 2022-01-01 RX ADMIN — POTASSIUM CHLORIDE 40 MEQ: 14.9 INJECTION, SOLUTION INTRAVENOUS at 01:10

## 2022-01-01 RX ADMIN — METRONIDAZOLE 500 MG: 500 INJECTION, SOLUTION INTRAVENOUS at 10:10

## 2022-01-01 RX ADMIN — POTASSIUM CHLORIDE 20 MEQ: 200 INJECTION, SOLUTION INTRAVENOUS at 09:10

## 2022-01-01 RX ADMIN — DILTIAZEM HYDROCHLORIDE 120 MG: 120 CAPSULE, COATED, EXTENDED RELEASE ORAL at 09:10

## 2022-01-01 RX ADMIN — MONTELUKAST 10 MG: 10 TABLET, FILM COATED ORAL at 03:10

## 2022-01-01 RX ADMIN — LEUCINE, PHENYLALANINE, LYSINE, METHIONINE, ISOLEUCINE, VALINE, HISTIDINE, THREONINE, TRYPTOPHAN, ALANINE, GLYCINE, ARGININE, PROLINE, SERINE, TYROSINE, DEXTROSE: 311; 238; 247; 170; 255; 247; 204; 179; 77; 880; 438; 489; 289; 213; 17; 5 INJECTION INTRAVENOUS at 07:10

## 2022-01-01 RX ADMIN — ATORVASTATIN CALCIUM 40 MG: 40 TABLET, FILM COATED ORAL at 10:10

## 2022-01-01 RX ADMIN — LEVETIRACETAM 500 MG: 100 INJECTION, SOLUTION INTRAVENOUS at 10:10

## 2022-01-01 RX ADMIN — APIXABAN 2.5 MG: 2.5 TABLET, FILM COATED ORAL at 08:10

## 2022-01-01 RX ADMIN — DEXTROSE AND SODIUM CHLORIDE: 5; 450 INJECTION, SOLUTION INTRAVENOUS at 07:10

## 2022-01-01 RX ADMIN — HYDRALAZINE HYDROCHLORIDE 75 MG: 50 TABLET, FILM COATED ORAL at 06:11

## 2022-01-01 RX ADMIN — LISINOPRIL 20 MG: 20 TABLET ORAL at 10:10

## 2022-01-01 RX ADMIN — LISINOPRIL 20 MG: 20 TABLET ORAL at 08:10

## 2022-01-01 RX ADMIN — VANCOMYCIN HYDROCHLORIDE 1000 MG: 1 INJECTION, POWDER, LYOPHILIZED, FOR SOLUTION INTRAVENOUS at 01:10

## 2022-01-01 RX ADMIN — LABETALOL HYDROCHLORIDE 10 MG: 5 INJECTION, SOLUTION INTRAVENOUS at 04:10

## 2022-01-01 RX ADMIN — HYDRALAZINE HYDROCHLORIDE 75 MG: 50 TABLET, FILM COATED ORAL at 06:10

## 2022-01-01 RX ADMIN — LABETALOL HYDROCHLORIDE 10 MG: 5 INJECTION, SOLUTION INTRAVENOUS at 11:10

## 2022-01-01 RX ADMIN — NICOTINE 1 PATCH: 21 PATCH, EXTENDED RELEASE TRANSDERMAL at 09:11

## 2022-01-01 RX ADMIN — DEXTROSE AND SODIUM CHLORIDE: 5; 450 INJECTION, SOLUTION INTRAVENOUS at 10:10

## 2022-01-01 RX ADMIN — METRONIDAZOLE 500 MG: 500 INJECTION, SOLUTION INTRAVENOUS at 04:10

## 2022-01-01 RX ADMIN — MONTELUKAST 10 MG: 10 TABLET, FILM COATED ORAL at 10:10

## 2022-01-01 RX ADMIN — METRONIDAZOLE 500 MG: 500 INJECTION, SOLUTION INTRAVENOUS at 11:10

## 2022-01-01 RX ADMIN — POTASSIUM BICARBONATE 25 MEQ: 977.5 TABLET, EFFERVESCENT ORAL at 05:10

## 2022-01-01 RX ADMIN — POTASSIUM BICARBONATE 50 MEQ: 977.5 TABLET, EFFERVESCENT ORAL at 03:10

## 2022-01-01 RX ADMIN — ISOSORBIDE MONONITRATE 120 MG: 60 TABLET, EXTENDED RELEASE ORAL at 09:11

## 2022-01-01 RX ADMIN — ISOSORBIDE MONONITRATE 120 MG: 60 TABLET, EXTENDED RELEASE ORAL at 09:10

## 2022-01-01 RX ADMIN — LEUCINE, PHENYLALANINE, LYSINE, METHIONINE, ISOLEUCINE, VALINE, HISTIDINE, THREONINE, TRYPTOPHAN, ALANINE, GLYCINE, ARGININE, PROLINE, SERINE, TYROSINE, DEXTROSE: 311; 238; 247; 170; 255; 247; 204; 179; 77; 880; 438; 489; 289; 213; 17; 5 INJECTION INTRAVENOUS at 04:10

## 2022-01-01 RX ADMIN — CIPROFLOXACIN 400 MG: 2 INJECTION, SOLUTION INTRAVENOUS at 10:10

## 2022-01-01 RX ADMIN — ATORVASTATIN CALCIUM 40 MG: 40 TABLET, FILM COATED ORAL at 09:11

## 2022-01-01 RX ADMIN — FAMOTIDINE 20 MG: 10 INJECTION, SOLUTION INTRAVENOUS at 01:10

## 2022-01-01 RX ADMIN — NICOTINE 1 PATCH: 21 PATCH, EXTENDED RELEASE TRANSDERMAL at 08:11

## 2022-01-01 RX ADMIN — POTASSIUM CHLORIDE 40 MEQ: 14.9 INJECTION, SOLUTION INTRAVENOUS at 07:10

## 2022-01-01 RX ADMIN — BARIUM SULFATE 10 ML: 0.81 POWDER, FOR SUSPENSION ORAL at 02:10

## 2022-01-01 RX ADMIN — LEVETIRACETAM 500 MG: 100 SOLUTION ORAL at 08:11

## 2022-01-01 RX ADMIN — DILTIAZEM HYDROCHLORIDE 120 MG: 120 CAPSULE, COATED, EXTENDED RELEASE ORAL at 09:11

## 2022-01-01 RX ADMIN — METOPROLOL SUCCINATE 50 MG: 50 TABLET, FILM COATED, EXTENDED RELEASE ORAL at 08:10

## 2022-01-01 RX ADMIN — CLONIDINE 1 PATCH: 0.1 PATCH TRANSDERMAL at 10:10

## 2022-01-01 RX ADMIN — ISOSORBIDE MONONITRATE 120 MG: 60 TABLET, EXTENDED RELEASE ORAL at 03:10

## 2022-01-01 RX ADMIN — HYDRALAZINE HYDROCHLORIDE 75 MG: 50 TABLET, FILM COATED ORAL at 09:11

## 2022-01-01 RX ADMIN — METOCLOPRAMIDE 10 MG: 5 INJECTION, SOLUTION INTRAMUSCULAR; INTRAVENOUS at 10:10

## 2022-01-01 RX ADMIN — POTASSIUM BICARBONATE 50 MEQ: 977.5 TABLET, EFFERVESCENT ORAL at 11:10

## 2022-01-01 RX ADMIN — CLOPIDOGREL BISULFATE 75 MG: 75 TABLET ORAL at 10:10

## 2022-01-01 RX ADMIN — SODIUM CHLORIDE, PRESERVATIVE FREE 10 ML: 5 INJECTION INTRAVENOUS at 11:10

## 2022-01-01 RX ADMIN — SODIUM CHLORIDE, PRESERVATIVE FREE 10 ML: 5 INJECTION INTRAVENOUS at 12:11

## 2022-01-01 RX ADMIN — MONTELUKAST 10 MG: 10 TABLET, FILM COATED ORAL at 09:10

## 2022-01-01 RX ADMIN — MONTELUKAST 10 MG: 10 TABLET, FILM COATED ORAL at 08:11

## 2022-01-01 RX ADMIN — MIRTAZAPINE 15 MG: 15 TABLET, FILM COATED ORAL at 08:10

## 2022-01-01 RX ADMIN — SODIUM CHLORIDE, PRESERVATIVE FREE 10 ML: 5 INJECTION INTRAVENOUS at 01:10

## 2022-01-01 RX ADMIN — APIXABAN 5 MG: 5 TABLET, FILM COATED ORAL at 09:10

## 2022-01-01 RX ADMIN — APIXABAN 2.5 MG: 2.5 TABLET, FILM COATED ORAL at 09:10

## 2022-01-01 RX ADMIN — DEXTROSE 125 ML: 10 SOLUTION INTRAVENOUS at 09:10

## 2022-01-01 RX ADMIN — MELATONIN TAB 3 MG 6 MG: 3 TAB at 08:10

## 2022-01-01 RX ADMIN — HYDRALAZINE HYDROCHLORIDE 10 MG: 20 INJECTION INTRAMUSCULAR; INTRAVENOUS at 05:11

## 2022-01-01 RX ADMIN — DILTIAZEM HYDROCHLORIDE 120 MG: 120 CAPSULE, COATED, EXTENDED RELEASE ORAL at 10:10

## 2022-01-01 RX ADMIN — HYDRALAZINE HYDROCHLORIDE 10 MG: 20 INJECTION INTRAMUSCULAR; INTRAVENOUS at 01:10

## 2022-01-01 RX ADMIN — LIDOCAINE HYDROCHLORIDE 60 MG: 20 INJECTION, SOLUTION INTRAVENOUS at 11:10

## 2022-01-01 RX ADMIN — SODIUM CHLORIDE, SODIUM GLUCONATE, SODIUM ACETATE, POTASSIUM CHLORIDE AND MAGNESIUM CHLORIDE: 526; 502; 368; 37; 30 INJECTION, SOLUTION INTRAVENOUS at 11:10

## 2022-01-01 RX ADMIN — METRONIDAZOLE 500 MG: 500 INJECTION, SOLUTION INTRAVENOUS at 01:10

## 2022-01-01 RX ADMIN — HYDRALAZINE HYDROCHLORIDE 10 MG: 20 INJECTION INTRAMUSCULAR; INTRAVENOUS at 08:10

## 2022-01-01 RX ADMIN — FAMOTIDINE 20 MG: 10 INJECTION, SOLUTION INTRAVENOUS at 08:10

## 2022-01-01 RX ADMIN — HYDRALAZINE HYDROCHLORIDE 75 MG: 50 TABLET, FILM COATED ORAL at 03:10

## 2022-01-01 RX ADMIN — METOPROLOL SUCCINATE 50 MG: 50 TABLET, FILM COATED, EXTENDED RELEASE ORAL at 01:10

## 2022-01-01 RX ADMIN — DEXTROSE AND SODIUM CHLORIDE: 5; 450 INJECTION, SOLUTION INTRAVENOUS at 09:10

## 2022-01-01 RX ADMIN — LABETALOL HYDROCHLORIDE 10 MG: 5 INJECTION, SOLUTION INTRAVENOUS at 05:10

## 2022-01-01 RX ADMIN — METOCLOPRAMIDE 10 MG: 5 INJECTION, SOLUTION INTRAMUSCULAR; INTRAVENOUS at 11:11

## 2022-01-01 RX ADMIN — LABETALOL HYDROCHLORIDE 5 MG: 5 INJECTION, SOLUTION INTRAVENOUS at 12:10

## 2022-01-01 RX ADMIN — METOCLOPRAMIDE 10 MG: 5 INJECTION, SOLUTION INTRAMUSCULAR; INTRAVENOUS at 11:10

## 2022-01-01 RX ADMIN — POTASSIUM CHLORIDE 20 MEQ: 200 INJECTION, SOLUTION INTRAVENOUS at 12:10

## 2022-01-01 RX ADMIN — HALOPERIDOL LACTATE 5 MG: 5 INJECTION, SOLUTION INTRAMUSCULAR at 09:10

## 2022-01-01 RX ADMIN — SODIUM CHLORIDE, PRESERVATIVE FREE 10 ML: 5 INJECTION INTRAVENOUS at 05:11

## 2022-01-01 RX ADMIN — APIXABAN 2.5 MG: 2.5 TABLET, FILM COATED ORAL at 08:11

## 2022-01-01 RX ADMIN — HYDRALAZINE HYDROCHLORIDE 75 MG: 50 TABLET, FILM COATED ORAL at 01:11

## 2022-01-01 RX ADMIN — LABETALOL HYDROCHLORIDE 10 MG: 5 INJECTION, SOLUTION INTRAVENOUS at 03:10

## 2022-01-01 RX ADMIN — METOCLOPRAMIDE 10 MG: 5 INJECTION, SOLUTION INTRAMUSCULAR; INTRAVENOUS at 01:11

## 2022-01-01 RX ADMIN — HYDRALAZINE HYDROCHLORIDE 75 MG: 50 TABLET, FILM COATED ORAL at 01:10

## 2022-01-01 RX ADMIN — SODIUM CHLORIDE, PRESERVATIVE FREE 10 ML: 5 INJECTION INTRAVENOUS at 01:11

## 2022-01-01 RX ADMIN — METOPROLOL TARTRATE 25 MG: 25 TABLET, FILM COATED ORAL at 08:11

## 2022-01-01 RX ADMIN — DILTIAZEM HYDROCHLORIDE 120 MG: 120 CAPSULE, COATED, EXTENDED RELEASE ORAL at 01:10

## 2022-01-01 RX ADMIN — LABETALOL HYDROCHLORIDE 10 MG: 5 INJECTION, SOLUTION INTRAVENOUS at 09:10

## 2022-01-01 RX ADMIN — LISINOPRIL 20 MG: 20 TABLET ORAL at 03:10

## 2022-01-01 RX ADMIN — LISINOPRIL 10 MG: 10 TABLET ORAL at 01:10

## 2022-01-01 RX ADMIN — CLONIDINE 1 PATCH: 0.1 PATCH TRANSDERMAL at 08:10

## 2022-01-01 RX ADMIN — PROPOFOL 20 MG: 10 INJECTION, EMULSION INTRAVENOUS at 11:10

## 2022-01-01 RX ADMIN — CLOPIDOGREL 75 MG: 75 TABLET, FILM COATED ORAL at 03:10

## 2022-01-01 RX ADMIN — ISOSORBIDE MONONITRATE 120 MG: 60 TABLET, EXTENDED RELEASE ORAL at 01:10

## 2022-01-01 RX ADMIN — DEXTROSE AND SODIUM CHLORIDE: 5; 450 INJECTION, SOLUTION INTRAVENOUS at 03:10

## 2022-01-01 RX ADMIN — POTASSIUM CHLORIDE 100 ML/HR: 149 INJECTION, SOLUTION, CONCENTRATE INTRAVENOUS at 11:10

## 2022-01-01 RX ADMIN — LEUCINE, PHENYLALANINE, LYSINE, METHIONINE, ISOLEUCINE, VALINE, HISTIDINE, THREONINE, TRYPTOPHAN, ALANINE, GLYCINE, ARGININE, PROLINE, SERINE, TYROSINE, DEXTROSE: 311; 238; 247; 170; 255; 247; 204; 179; 77; 880; 438; 489; 289; 213; 17; 5 INJECTION INTRAVENOUS at 10:10

## 2022-01-01 RX ADMIN — FAMOTIDINE 20 MG: 20 TABLET, FILM COATED ORAL at 08:11

## 2022-01-01 RX ADMIN — DILTIAZEM HYDROCHLORIDE 120 MG: 120 CAPSULE, COATED, EXTENDED RELEASE ORAL at 08:10

## 2022-01-01 RX ADMIN — MAGNESIUM SULFATE 2 G: 2 INJECTION INTRAVENOUS at 11:10

## 2022-01-01 RX ADMIN — CLOPIDOGREL BISULFATE 75 MG: 75 TABLET ORAL at 08:10

## 2022-01-01 RX ADMIN — POTASSIUM BICARBONATE 25 MEQ: 977.5 TABLET, EFFERVESCENT ORAL at 06:10

## 2022-01-01 RX ADMIN — LISINOPRIL 20 MG: 20 TABLET ORAL at 09:11

## 2022-01-01 RX ADMIN — GLYCOPYRROLATE 0.2 MG: 0.2 INJECTION INTRAMUSCULAR; INTRAVENOUS at 11:10

## 2022-01-01 RX ADMIN — CLOPIDOGREL BISULFATE 75 MG: 75 TABLET ORAL at 08:11

## 2022-01-01 RX ADMIN — ONDANSETRON 4 MG: 2 INJECTION INTRAMUSCULAR; INTRAVENOUS at 09:10

## 2022-01-01 RX ADMIN — FAMOTIDINE 20 MG: 10 INJECTION, SOLUTION INTRAVENOUS at 03:10

## 2022-01-01 RX ADMIN — CLONIDINE 1 PATCH: 0.1 PATCH TRANSDERMAL at 09:11

## 2022-01-01 RX ADMIN — DILTIAZEM HYDROCHLORIDE 120 MG: 120 CAPSULE, COATED, EXTENDED RELEASE ORAL at 08:11

## 2022-01-01 RX ADMIN — ISOSORBIDE MONONITRATE 120 MG: 60 TABLET, EXTENDED RELEASE ORAL at 12:10

## 2022-01-01 RX ADMIN — LABETALOL HYDROCHLORIDE 10 MG: 5 INJECTION, SOLUTION INTRAVENOUS at 08:10

## 2022-01-01 RX ADMIN — LEVETIRACETAM 250 MG: 100 SOLUTION ORAL at 08:10

## 2022-01-01 RX ADMIN — METRONIDAZOLE 500 MG: 500 INJECTION, SOLUTION INTRAVENOUS at 02:10

## 2022-01-01 RX ADMIN — ACETAMINOPHEN 1000 MG: 500 TABLET, FILM COATED ORAL at 07:10

## 2022-01-01 RX ADMIN — ASPIRIN 300 MG: 300 SUPPOSITORY RECTAL at 09:10

## 2022-01-01 RX ADMIN — ONDANSETRON 4 MG: 2 INJECTION INTRAMUSCULAR; INTRAVENOUS at 02:10

## 2022-01-01 RX ADMIN — ACETAMINOPHEN 650 MG: 325 TABLET ORAL at 03:10

## 2022-01-01 RX ADMIN — HYDRALAZINE HYDROCHLORIDE 10 MG: 20 INJECTION INTRAMUSCULAR; INTRAVENOUS at 05:10

## 2022-01-01 RX ADMIN — LEVETIRACETAM 500 MG: 100 INJECTION, SOLUTION INTRAVENOUS at 01:10

## 2022-01-01 RX ADMIN — SODIUM CHLORIDE, PRESERVATIVE FREE 10 ML: 5 INJECTION INTRAVENOUS at 04:10

## 2022-01-01 RX ADMIN — METOPROLOL SUCCINATE 50 MG: 50 TABLET, FILM COATED, EXTENDED RELEASE ORAL at 03:10

## 2022-01-01 RX ADMIN — LEVOFLOXACIN 500 MG: 500 INJECTION, SOLUTION INTRAVENOUS at 11:10

## 2022-01-01 RX ADMIN — DEXTROSE 125 ML: 10 SOLUTION INTRAVENOUS at 04:10

## 2022-01-01 RX ADMIN — LISINOPRIL 20 MG: 20 TABLET ORAL at 08:11

## 2022-01-01 RX ADMIN — HYDRALAZINE HYDROCHLORIDE 10 MG: 20 INJECTION INTRAMUSCULAR; INTRAVENOUS at 12:10

## 2022-01-01 RX ADMIN — ATORVASTATIN CALCIUM 40 MG: 40 TABLET, FILM COATED ORAL at 08:11

## 2022-01-01 RX ADMIN — LEVETIRACETAM 250 MG: 100 INJECTION, SOLUTION INTRAVENOUS at 03:10

## 2022-01-01 RX ADMIN — LABETALOL HYDROCHLORIDE 5 MG: 5 INJECTION, SOLUTION INTRAVENOUS at 06:10

## 2022-01-01 RX ADMIN — SODIUM CHLORIDE, PRESERVATIVE FREE 10 ML: 5 INJECTION INTRAVENOUS at 11:11

## 2022-01-01 RX ADMIN — METRONIDAZOLE 500 MG: 500 INJECTION, SOLUTION INTRAVENOUS at 12:10

## 2022-01-01 RX ADMIN — NICOTINE 1 PATCH: 21 PATCH, EXTENDED RELEASE TRANSDERMAL at 01:10

## 2022-01-29 PROBLEM — G45.9 TIA (TRANSIENT ISCHEMIC ATTACK): Status: ACTIVE | Noted: 2022-01-01

## 2022-01-29 NOTE — HPI
LKN yesterday  Noticed transient slurred speech noticed at 1030am today. Back to baseline.     PMHx  Right AKA  HTN  Smoking 1ppd

## 2022-01-29 NOTE — CONSULTS
[facility]  Vascular Neurology  Comprehensive Stroke Center  TeleVascular Neurology Acute Consultation Note      Consults    Consulting Provider: LEXY GRIFFIN  Current Providers  No providers found    Patient Location: [facility] Emergency Department  Spoke hospital nurse at bedside with patient assisting consultant.     Patient information was obtained from patient and relative(s).         Assessment/Plan:       Diagnoses:   TIA (transient ischemic attack)  Transient slurred speech and left sided weaknes  Antithrombotics for secondary stroke prevention: Antiplatelets: Aspirin: 81 mg daily    Statins for secondary stroke prevention and hyperlipidemia, if present:   Statins: Atorvastatin- 80 mg daily    Aggressive risk factor modification: HTN, Smoking     Rehab efforts: The patient has been evaluated by a stroke team provider and the therapy needs have been fully considered based off the presenting complaints and exam findings. The following therapy evaluations are needed: PT evaluate and treat, OT evaluate and treat, SLP evaluate and treat    Diagnostics ordered/pending: Carotid ultrasound to assess vasculature, HgbA1C to assess blood glucose levels, Lipid Profile to assess cholesterol levels, MRI head without contrast to assess brain parenchyma, TTE to assess cardiac function/status , TSH to assess thyroid function    VTE prophylaxis: Heparin 5000 units SQ every 8 hours    BP parameters: TIA: SBP <220 until imaging confirmation of no infarct             STROKE DOCUMENTATION     Acute Stroke Times:   Acute Stroke Times   Last Known Normal Date: 01/29/22  Stroke Team Arrival Date: 01/29/22  Stroke Team Arrival Time: 1621    NIH Scale:  Interval: baseline  1a. Level of Consciousness: 0-->Alert, keenly responsive  1b. LOC Questions: 0-->Answers both questions correctly  1c. LOC Commands: 0-->Performs both tasks correctly  2. Best  Gaze: 0-->Normal  3. Visual: 0-->No visual loss  4. Facial Palsy: 0-->Normal symmetrical movements  5a. Motor Arm, Left: 2-->Some effort against gravity, limb cannot get to or maintain (if cued) 90 (or 45) degrees, drifts down to bed, but has some effort against gravity  5b. Motor Arm, Right: 0-->No drift, limb holds 90 (or 45) degrees for full 10 secs  6a. Motor Leg, Left: 0-->No drift, leg holds 30 degree position for full 5 secs  6b. Motor Leg, Right: (UN) Amputation or joint fusion  7. Limb Ataxia: 0-->Absent  8. Sensory: 0-->Normal, no sensory loss  9. Best Language: 0-->No aphasia, normal  10. Dysarthria: 0-->Normal  11. Extinction and Inattention (formerly Neglect): 0-->No abnormality  Total (NIH Stroke Scale): 2     Modified Aurelia Score: 1  Alex Coma Scale:15   ABCD2 Score:    [id number] Score:   HAS -BLED Score:   ICH Score:   Hunt & Zavaleta Classification:       There were no vitals taken for this visit.  Alteplase Eligible?: No  Alteplase Recommendation: Alteplase not recommended due to Outside of treatment window  and Patient back to neurological baseline  Possible Interventional Revascularization Candidate? No; no significant neurologic deficit (NIHSS <6)     Disposition Recommendation: admit to inpatient    Subjective:     History of Present Illness:  LKN yesterday  Noticed transient slurred speech noticed at 1030am today. Back to baseline.     PMHx  Right AKA  HTN  Smoking 1ppd        Woke up with symptoms?: no    Recent bleeding noted: no  Does the patient take any Blood Thinners? no  Medications: No relevant medications      Past Medical History: hypertension    Past Surgical History: none    Family History: no relevant history    Social History: smoker (active)    Allergies: Allergies have not been reviewed keflex    Review of Systems   Neurological: Positive for speech difficulty and weakness.   All other systems reviewed and are negative.    Objective:   Vitals: There were no vitals taken for  this visit. BP: 118/79, Respiratory Rate: 18 and Heart Rate: 90    CT READ: Yes  No hemmorhage. No mass effect. No early infarct signs.     Physical Exam  Vitals reviewed.   Constitutional:       Appearance: Normal appearance.   HENT:      Head: Normocephalic and atraumatic.   Eyes:      Extraocular Movements: Extraocular movements intact.      Pupils: Pupils are equal, round, and reactive to light.   Pulmonary:      Effort: Pulmonary effort is normal.   Neurological:      General: No focal deficit present.      Mental Status: She is alert and oriented to person, place, and time.               Recommended the emergency room physician to have a brief discussion with the patient and/or family if available regarding the risks and benefits of treatment, and to briefly document the occurrence of that discussion in his clinical encounter note.     The attending portion of this evaluation, treatment, and documentation was performed per Marcello Fairbanks MD via audiovisual.    Billing code:  ([facility] INPT CONSULT 35MIN)    In your opinion, this was a: Tier 2 Van Negative    Consult End Time: 4:34 PM     Marcello Fairbanks MD  Comprehensive Stroke Center  Vascular Neurology   [facility]

## 2022-01-29 NOTE — ASSESSMENT & PLAN NOTE
Transient slurred speech and left sided weaknes  Antithrombotics for secondary stroke prevention: Antiplatelets: Aspirin: 81 mg daily    Statins for secondary stroke prevention and hyperlipidemia, if present:   Statins: Atorvastatin- 80 mg daily    Aggressive risk factor modification: HTN, Smoking     Rehab efforts: The patient has been evaluated by a stroke team provider and the therapy needs have been fully considered based off the presenting complaints and exam findings. The following therapy evaluations are needed: PT evaluate and treat, OT evaluate and treat, SLP evaluate and treat    Diagnostics ordered/pending: Carotid ultrasound to assess vasculature, HgbA1C to assess blood glucose levels, Lipid Profile to assess cholesterol levels, MRI head without contrast to assess brain parenchyma, TTE to assess cardiac function/status , TSH to assess thyroid function    VTE prophylaxis: Heparin 5000 units SQ every 8 hours    BP parameters: TIA: SBP <220 until imaging confirmation of no infarct

## 2022-01-29 NOTE — SUBJECTIVE & OBJECTIVE
Woke up with symptoms?: no    Recent bleeding noted: no  Does the patient take any Blood Thinners? no  Medications: No relevant medications      Past Medical History: hypertension    Past Surgical History: none    Family History: no relevant history    Social History: smoker (active)    Allergies: Allergies have not been reviewed keflex    Review of Systems   Neurological: Positive for speech difficulty and weakness.   All other systems reviewed and are negative.    Objective:   Vitals: There were no vitals taken for this visit. BP: 118/79, Respiratory Rate: 18 and Heart Rate: 90    CT READ: Yes  No hemmorhage. No mass effect. No early infarct signs.     Physical Exam  Vitals reviewed.   Constitutional:       Appearance: Normal appearance.   HENT:      Head: Normocephalic and atraumatic.   Eyes:      Extraocular Movements: Extraocular movements intact.      Pupils: Pupils are equal, round, and reactive to light.   Pulmonary:      Effort: Pulmonary effort is normal.   Neurological:      General: No focal deficit present.      Mental Status: She is alert and oriented to person, place, and time.

## 2022-05-17 NOTE — PROGRESS NOTES
Subjective:       Patient ID: Daina Kinsey is a 82 y.o. female.    Chief Complaint:  Stroke (2 month f/u CVA. Pt denies headaches, blurred vision and slurred speech. Left sided weakness in extremities is present. Pts daughter stated that patient has been having some confused thoughts and confusion on where she is at times.)      History of Present Illness  Patient presents for follow up of CVA. Denies any new onset of numbness, tingling or weakness. Reports still with residual left sided weakness. Patient's daughter in room with patient. States that patient has been having some confusion. Cognitive eval at [facility] revealed mild to moderate cognitive impairment but patient was not wanting to start medication at that time. Patient is wheelchair bound. Daughter reports patient does have hallucinations at times with irritability. Has had seizures in the past but reports it has been over a year if not more since last seizure activity. Patient does not ambulate any more. Has right AKA. Does not smoke anymore. Lives with her daughter. Sleeps well at night.         Review of Systems  Review of Systems   Constitutional: Negative.    HENT: Negative.    Eyes: Negative.    Respiratory: Negative.    Cardiovascular: Negative.    Gastrointestinal: Negative.    Endocrine: Negative.    Genitourinary: Negative.    Musculoskeletal: Negative.    Allergic/Immunologic: Negative.    Hematological: Negative.    Psychiatric/Behavioral: Positive for confusion.       Objective:      Neurologic Exam     Mental Status   Disoriented to person.   Oriented to place. Oriented to city and area.   Follows 2 step commands.   Attention: decreased. Concentration: decreased.   Speech: speech is normal   Level of consciousness: alert  Knowledge: poor.   Able to name object.     Cranial Nerves   Cranial nerves II through XII intact.     CN III, IV, VI   Pupils are equal, round, and reactive to light.    Motor Exam   Overall muscle tone: decreased  Right  arm tone: normal  Left arm tone: increasedRight AKA     Sensory Exam   Light touch normal.     Gait, Coordination, and Reflexes Wheelchair bound       Physical Exam  Vitals and nursing note reviewed.   Constitutional:       Appearance: Normal appearance. She is normal weight.   HENT:      Head: Normocephalic.   Eyes:      Extraocular Movements: Extraocular movements intact.      Conjunctiva/sclera: Conjunctivae normal.      Pupils: Pupils are equal, round, and reactive to light.   Pulmonary:      Effort: Pulmonary effort is normal.   Musculoskeletal:         General: Normal range of motion.      Cervical back: Normal range of motion.      Right Lower Extremity: Right leg is amputated above knee.   Skin:     General: Skin is warm and dry.   Neurological:      General: No focal deficit present.      Mental Status: She is alert. Mental status is at baseline.      Cranial Nerves: Cranial nerves are intact.      Sensory: Sensation is intact.      Motor: Motor function is intact.      Coordination: Coordination is intact.      Deep Tendon Reflexes: Reflexes are normal and symmetric.   Psychiatric:         Attention and Perception: Attention normal.         Mood and Affect: Mood normal.         Speech: Speech normal.         Behavior: Behavior normal. Behavior is cooperative.         Thought Content: Thought content normal.         Cognition and Memory: Cognition normal.         Judgment: Judgment normal.           Assessment:        1. Cerebrovascular accident (CVA), unspecified mechanism    2. Seizures    3. Dementia with behavioral disturbance, unspecified dementia type        Plan:   Continue secondary stroke prevention  Discussed with patient's daughter that I do not think that any of the memory medications would be helpful currently. Patient is not wanting to take medications and also has history of heart problems. Patient's daughters agree and will continue to monitor patient  Decrease Keppra to 250 mg BID,  hopefully this helps with patient's irritability. Advised to monitor and call office with any seizure activity  Can consider Seroquel in the future but would prefer to monitor patient  Encouraged hydration; patient has had multiple UTIs and constipation issues  Discussed memory loss as a progressive process. Memory loss/dementia worsens over time and medications will not reverse memory loss symptoms. May have to begin management patient hallucinations/behavior problems in the future with medication.    MDM moderate

## 2022-06-17 NOTE — ED PROVIDER NOTES
Encounter Date: 6/17/2022       History     Chief Complaint   Patient presents with    Fall     Daughter states pt fell out of bed at 0400 this morning and is c/o left foot pain. Pt is on blood thinners.     Patient presents with:  Fall: Daughter states pt fell out of bed at 0400 this morning and is c/o left foot pain. Pt is on blood thinners.    I Alton ELLISON assumed care of pt at 1715,  elderly anticoagulated female with history of CVA dementia left-sided paresis, right leg amputation spending the night at daughter's house last night reports getting up in the nighttime to reach out for TV remote control falling on floor unknown time.  Daughter states waking at 4am to mother calling out for help found awake wedged in corner room, picked up and placed back into bed.  Patient reports after work today returning home with patient complaining of left foot pain transported to emergency department for evaluation.    The history is provided by a relative. No  was used.   Fall  The accident occurred several hours ago. Pertinent negatives include no neck pain, no back pain, no abdominal pain and no vomiting.     Review of patient's allergies indicates:   Allergen Reactions    Cephalexin Hives and Itching     Past Medical History:   Diagnosis Date    Arthritis     Atherosclerosis     Cataracts, bilateral     COPD (chronic obstructive pulmonary disease)     Gallstones     HTN (hypertension)     PAD (peripheral artery disease)     Seizure     Stomach ulcer     Stroke     Vitamin D deficiency      Past Surgical History:   Procedure Laterality Date    CREATION OF FEMORAL-TIBIAL ARTERY BYPASS      INFERIOR VENA CAVA ANGIOPLASTY / STENTING      LEG AMPUTATION THROUGH KNEE      right    REPAIR OF CAROTID ARTERY      TOE AMPUTATION      left great toe     No family history on file.  Social History     Tobacco Use    Smoking status: Former Smoker    Smokeless tobacco: Never Used    Substance Use Topics    Alcohol use: Never    Drug use: Never     Review of Systems   Constitutional: Negative.    HENT: Negative.    Respiratory: Negative.    Cardiovascular: Negative for chest pain.   Gastrointestinal: Negative for abdominal pain and vomiting.   Musculoskeletal: Positive for arthralgias and myalgias. Negative for back pain and neck pain.   Neurological: Negative.    Hematological: Bruises/bleeds easily.   Psychiatric/Behavioral: Negative.        Physical Exam     Initial Vitals [06/17/22 1707]   BP Pulse Resp Temp SpO2   119/70 76 18 97.9 °F (36.6 °C) 100 %      MAP       --         Physical Exam    Nursing note and vitals reviewed.  Constitutional: She appears well-developed and well-nourished. She is not diaphoretic. She is active.  Non-toxic appearance. No distress.   Awake alert pleasant elderly female talking to daughter   HENT:   Head: Normocephalic and atraumatic.   Neck: Trachea normal. Neck supple. No tracheal deviation present.   Normal range of motion.  Cardiovascular: Normal rate, regular rhythm and normal pulses.   Pulmonary/Chest: Breath sounds normal. No respiratory distress.   Abdominal: Abdomen is soft. There is no abdominal tenderness.   Musculoskeletal:         General: Tenderness present. No edema. Normal range of motion.      Right upper arm: Normal.      Left upper arm: Normal.      Cervical back: Normal, normal range of motion and neck supple.      Thoracic back: Normal.      Lumbar back: Normal.      Right upper leg: Normal.      Left upper leg: Normal.      Left lower leg: Normal.      Left ankle: Normal.      Left foot: Tenderness present. No swelling, foot drop or crepitus.      Comments: Right AKA.  Left foot 1st and 2nd toe amputation with healed debridement no erythema no laceration no open wounds no edema.  Mild tenderness to palpation     Neurological: She is alert. She has normal strength.   Skin: Skin is warm, dry and intact. No rash noted.   Psychiatric: She  has a normal mood and affect.         ED Course   Procedures  Labs Reviewed - No data to display       Imaging Results          CT Head Without Contrast (Final result)  Result time 06/17/22 18:02:12    Final result by Lorenzo Villalba MD (06/17/22 18:02:12)                 Impression:      No acute intracranial traumatic findings identified.      Electronically signed by: Lorenzo Villalba  Date:    06/17/2022  Time:    18:02             Narrative:    EXAMINATION:  CT HEAD WITHOUT CONTRAST    CLINICAL HISTORY:  trauma;    TECHNIQUE:  Sequential axial images were performed of the brain without contrast.    Dose product length of 656 mGycm. Automated exposure control was utilized to minimize radiation dose.    COMPARISON:  February 1, 2022.    FINDINGS:  There is no intracranial mass effect, midline shift, hydrocephalus or hemorrhage. There is no sulcal effacement or low attenuation changes to suggest recent large vessel territory infarction.  There is right temporal and occipital which encephalomalacia related to old infarcts.  There is also left thalamic old lacunar infarct.  Chronic appearing periventricular and subcortical white matter low attenuation changes are present and are consistent with chronic microangiopathic ischemia. The ventricular system and sulcal markings prominence is consistent with atrophy. There is no acute extra axial fluid collection.  There is no acute depressed calvarial fracture.  Visualized paranasal sinuses are clear without mucosal thickening, polypoidal abnormality or air-fluid levels. Mastoid air cells aeration is optimal.                               CT Cervical Spine Without Contrast (Final result)  Result time 06/17/22 18:08:01    Final result by Lorenzo Villalba MD (06/17/22 18:08:01)                 Impression:      No acute fracture or malalignment identified.      Electronically signed by: Lorenzo Villalba  Date:    06/17/2022  Time:    18:08             Narrative:    EXAMINATION:  CT  CERVICAL SPINE WITHOUT CONTRAST    CLINICAL HISTORY:  Trauma.    TECHNIQUE:  Multidetector axial images were performed of the cervical spine without and.  Images were reconstructed.    Automated exposure control was utilized to minimize radiation dose.  .    COMPARISON:  None available.    FINDINGS:  Neck was placed with rotation within the scanner which limits assessment.  C5-C6 interbody fusion.  Cervical vertebrae stature is maintained and alignment is unremarkable.  No acute fracture or malalignment identified.  This..  There is no prevertebral soft tissue prominence.    This study does not exclude the possibility of intrathecal soft tissue, ligamentous or vascular injury.                               X-Ray Foot Complete Left (Final result)  Result time 06/17/22 18:16:26    Final result by Lorenzo Villalba MD (06/17/22 18:16:26)                 Impression:      No acute fracture identified.      Electronically signed by: Lorenzo Villalba  Date:    06/17/2022  Time:    18:16             Narrative:    EXAMINATION:  XR FOOT COMPLETE 3 VIEW LEFT    CLINICAL HISTORY:  Unspecified fall, initial encounter    TECHNIQUE:  Three views    COMPARISON:  None    FINDINGS:  Previous amputation of the great toe.  Considerable demineralization of the bones.  Articular surfaces alignment is preserved.  No acute fracture or dislocation                                 Medications - No data to display              ED Course as of 06/17/22 1830   Fri Jun 17, 2022   1829 Patient resting comfortably in bed attended by daughter.  Patient and daughter understand x-ray results and CT results concern for recent fall with no acute fracture or ICH.  Daughter relieved ready for discharge now. [TQ]      ED Course User Index  [TQ] SCOT Glynn             Clinical Impression:   Final diagnoses:  [W19.XXXA] Fall  [W19.XXXA] Fall, initial encounter (Primary)  [M79.672] Foot pain, left          ED Disposition Condition    Discharge  Stable        ED Prescriptions     None        Follow-up Information     Follow up With Specialties Details Why Contact Info    your primar care physician  Call in 1 week             SCOT Glynn  06/17/22 3032

## 2022-06-17 NOTE — DISCHARGE INSTRUCTIONS
No acute head, neck or foot imaging findings tonight.  Recommend bedrails at night to avoid falls.  Recommend follow-up with primary care physician in 1 week for re-evaluation and podiatrist for continued foot care.

## 2022-06-17 NOTE — ED TRIAGE NOTES
Daughter states pt fell out of bed at 0400 this morning and is c/o left foot pain. Pt is on blood thinners.

## 2022-09-29 NOTE — PROGRESS NOTES
"Subjective:       Patient ID: Daina Kinsey is a 82 y.o. female.    Vitals:  height is 5' 1.81" (1.57 m) and weight is 47.6 kg (105 lb). Her oral temperature is 97.7 °F (36.5 °C). Her blood pressure is 176/80 (abnormal) and her pulse is 64. Her respiration is 16 and oxygen saturation is 97%.     Chief Complaint: Dysuria (Foul smelling urine)    HPI  82-year-old female with a history of dementia, brought in by family secondary to foul-smelling urine for several days, no fever, no mental status changes.  No vomiting or diarrhea.  ROS    Constitutional: negative except as stated in HPI  Eye: negative except as stated in HPI  ENT: negative except as stated in HPI  Respiratory: negative except as stated in HPI  Cardiovascular: negative except as stated in HPI  Gastrointestinal: negative except as stated in HPI  Genitourinary: negative except as stated in HPI  Objective:      Physical Exam    VITAL SIGNS:  Reviewed.      GENERAL:  In no apparent distress, in wheelchair, smiling, states she feels fine  HEAD:  No signs of head trauma  EYES:  Pupils are equal.  Extraocular motions intact    CHEST:  Clear breath sounds bilaterally.  No wheezes  CARDIAC:  Regular   ABDOMEN: Soft, nontender  PSYCHIATRIC:  Mood normal    Results for orders placed or performed in visit on 09/29/22   POCT Urinalysis, Dipstick, Automated, W/O Scope   Result Value Ref Range    POC Blood, Urine Positive (A) Negative    POC Bilirubin, Urine Negative Negative    POC Urobilinogen, Urine 0.2 0.1 - 1.1    POC Ketones, Urine Negative Negative    POC Protein, Urine Positive (A) Negative    POC Nitrates, Urine Negative Negative    POC Glucose, Urine Negative Negative    pH, UA 6.5     POC Specific Gravity, Urine 1.015 1.003 - 1.029    POC Leukocytes, Urine Negative Negative       Assessment:       1. Foul smelling urine            Plan:         Foul smelling urine  -     POCT Urinalysis, Dipstick, Automated, W/O Scope  -     Urine culture       Will send " urine for culture and notify if indicated.  Encouraged fluids, family will monitor closely.  Discussed ER precautions.

## 2022-10-07 PROBLEM — I63.9 STROKE: Status: ACTIVE | Noted: 2022-01-01

## 2022-10-07 NOTE — ED PROVIDER NOTES
Source of History:  Daughter    Chief complaint:  Aphasia      HPI:  Daina Kinsey is a 82 y.o. 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 Onset of symptoms was sudden, not related to any specific activity. Symptoms are currently of moderate severity. Symptoms have lasted several hours, last seen this morning and sleeping at that time. Prior stroke history: yes:  right MCA. Patient also complains of none. Patient denies chest pain and shortness of breath.       Review of Systems   Constitutional symptoms:  Negative except as documented in HPI.   Skin symptoms:  Negative except as documented in HPI.   HEENT symptoms:  Negative except as documented in HPI.   Respiratory symptoms:  Negative except as documented in HPI.   Cardiovascular symptoms:  Negative except as documented in HPI.   Gastrointestinal symptoms:  Negative except as documented in HPI.    Genitourinary symptoms:  Negative except as documented in HPI.   Musculoskeletal symptoms:  Negative except as documented in HPI.   Neurologic symptoms:  Negative except as documented in HPI.   Psychiatric symptoms:  Negative except as documented in HPI.   Allergy/immunologic symptoms:  Negative except as documented in HPI.             Additional review of systems information: All other systems reviewed and otherwise negative.      Review of patient's allergies indicates:   Allergen Reactions    Cephalexin Hives and Itching       PMH:  As per HPI and below:    Past Medical History:   Diagnosis Date    Arthritis     Atherosclerosis   "   Cataracts, bilateral     COPD (chronic obstructive pulmonary disease)     Gallstones     HTN (hypertension)     PAD (peripheral artery disease)     Seizure     Stomach ulcer     Stroke     Vitamin D deficiency         History reviewed. No pertinent family history.    Past Surgical History:   Procedure Laterality Date    CREATION OF FEMORAL-TIBIAL ARTERY BYPASS      INFERIOR VENA CAVA ANGIOPLASTY / STENTING      LEG AMPUTATION THROUGH KNEE      right    REPAIR OF CAROTID ARTERY      TOE AMPUTATION      left great toe       Social History     Tobacco Use    Smoking status: Former    Smokeless tobacco: Never   Substance Use Topics    Alcohol use: Never    Drug use: Never       Patient Active Problem List   Diagnosis    TIA (transient ischemic attack)        Physical Exam:    BP (!) 159/74   Pulse 73   Temp 98.2 °F (36.8 °C) (Temporal)   Resp 20   Ht 5' 1" (1.549 m)   Wt 47.6 kg (105 lb)   SpO2 (!) 94%   BMI 19.84 kg/m²     Nursing note and vital signs reviewed.    General:  Alert, elderly  Skin: Normal for Ethnic Origin, No cyanosis  HEENT: Normocephalic and atraumatic, Vision unchanged, Pupils symmetric, No icterus , Nasal mucosa is pink and moist  Cardiovascular:  Regular rate and rhythm, No edema  Chest Wall: No deformity, equal chest rise  Respiratory:  Lungs are clear to auscultation, respirations are non-labored.    Musculoskeletal:  left side weakness that is chronic, right AKA   : No suprapubic pain, No CVA tenderness  Gastrointestinal:  Soft, Nontender, Non distended, Normal bowel sounds.    Neurological:  Alert aphasia, left sided weakness, seems to understand questions however unable to respond  Psychiatric:  Cooperative, appropriate mood & affect.        Labs that have been ordered have been independently reviewed and interpreted by myself.     Old Chart Reviewed.      Initial Impression/ Differential Dx:  CVA, TIA, electrolyte abnormality, hypoglycemia, [place] Palsy, seizure, migraine, " syncope.      MDM:      Reviewed Nurses Note.    Reviewed Pertinent old records.    Orders Placed This Encounter    CT Head Without Contrast    CBC W/ AUTO DIFFERENTIAL    Comprehensive metabolic panel    Protime-INR    CBC with Differential    Troponin I    BNP    Magnesium    APTT    COVID-19 Rapid Screening    Urinalysis, Reflex to Urine Culture Urine, Clean Catch    Manual Differential    Diet NPO    Cardiac Monitoring - Adult    Vital signs    Neuro checks:  LOC, Orientation, GCS    Complete Doan Screening Assessment    Complete NIH Stroke Scale    Pulse Oximetry Continuous    Oxygen Continuous    ECG 12 lead    Insert peripheral IV    Admit to Inpatient                    Labs Reviewed   COMPREHENSIVE METABOLIC PANEL - Abnormal; Notable for the following components:       Result Value    Chloride 110 (*)     Creatinine 1.22 (*)     Globulin 4.0 (*)     Albumin/Globulin Ratio 0.9 (*)     All other components within normal limits   PROTIME-INR - Abnormal; Notable for the following components:    PT 16.3 (*)     INR 1.34 (*)     All other components within normal limits   CBC WITH DIFFERENTIAL - Abnormal; Notable for the following components:    RBC 3.73 (*)     Hgb 11.2 (*)     Hct 36.3 (*)     MCV 97.3 (*)     MCHC 30.9 (*)     All other components within normal limits   APTT - Abnormal; Notable for the following components:    PTT 40.3 (*)     All other components within normal limits   MANUAL DIFFERENTIAL - Abnormal; Notable for the following components:    Neut Man 28 (*)     Lymph Man 62 (*)     Abs Neut calc 1.316 (*)     RBC Morph Abnormal (*)     Anisocyte 1+ (*)     Poik 1+ (*)     All other components within normal limits   TROPONIN I - Normal   B-TYPE NATRIURETIC PEPTIDE - Normal   MAGNESIUM - Normal   SARS-COV-2 RNA AMPLIFICATION, QUAL - Normal   CBC W/ AUTO DIFFERENTIAL    Narrative:     The following orders were created for panel order CBC W/ AUTO DIFFERENTIAL.  Procedure                                Abnormality         Status                     ---------                               -----------         ------                     CBC with Differential[[id number]]        Abnormal            Final result               Manual Differential[[id number]]          Abnormal            Final result                 Please view results for these tests on the individual orders.   URINALYSIS, REFLEX TO URINE CULTURE          CT Head Without Contrast   Final Result      Chronic age-related changes with old areas of ischemia in the MCA distributions bilaterally      No acute process         Electronically signed by: Meir Alberts   Date:    10/07/2022   Time:    14:51           Admission on 10/07/2022   Component Date Value Ref Range Status    Sodium Level 10/07/2022 145  136 - 145 mmol/L Final    Potassium Level 10/07/2022 4.2  3.5 - 5.1 mmol/L Final    Chloride 10/07/2022 110 (H)  98 - 107 mmol/L Final    Carbon Dioxide 10/07/2022 28  23 - 31 mmol/L Final    Glucose Level 10/07/2022 83  82 - 115 mg/dL Final    Blood Urea Nitrogen 10/07/2022 19.6  9.8 - 20.1 mg/dL Final    Creatinine 10/07/2022 1.22 (H)  0.55 - 1.02 mg/dL Final    Calcium Level Total 10/07/2022 9.8  8.4 - 10.2 mg/dL Final    Protein Total 10/07/2022 7.5  5.8 - 7.6 gm/dL Final    Albumin Level 10/07/2022 3.5  3.4 - 4.8 gm/dL Final    Globulin 10/07/2022 4.0 (H)  2.4 - 3.5 gm/dL Final    Albumin/Globulin Ratio 10/07/2022 0.9 (L)  1.1 - 2.0 ratio Final    Bilirubin Total 10/07/2022 0.3  <=1.5 mg/dL Final    Alkaline Phosphatase 10/07/2022 88  40 - 150 unit/L Final    Alanine Aminotransferase 10/07/2022 13  0 - 55 unit/L Final    Aspartate Aminotransferase 10/07/2022 22  5 - 34 unit/L Final    eGFR 10/07/2022 45  mls/min/1.73/m2 Final    PT 10/07/2022 16.3 (H)  11.7 - 14.5 seconds Final    INR 10/07/2022 1.34 (L)  2.00 - 3.00 Final    WBC 10/07/2022 4.7  4.5 - 11.5 x10(3)/mcL Final    RBC 10/07/2022 3.73 (L)  4.20 - 5.40 x10(6)/mcL Final    Hgb  10/07/2022 11.2 (L)  12.0 - 16.0 gm/dL Final    Hct 10/07/2022 36.3 (L)  37.0 - 47.0 % Final    MCV 10/07/2022 97.3 (H)  80.0 - 94.0 fL Final    MCH 10/07/2022 30.0  27.0 - 31.0 pg Final    MCHC 10/07/2022 30.9 (L)  33.0 - 36.0 mg/dL Final    RDW 10/07/2022 14.7  11.5 - 17.0 % Final    Platelet 10/07/2022 235  130 - 400 x10(3)/mcL Final    MPV 10/07/2022 9.9  7.4 - 10.4 fL Final    IG# 10/07/2022 0.01  0 - 0.04 x10(3)/mcL Final    IG% 10/07/2022 0.2  % Final    NRBC% 10/07/2022 0.0  % Final    Troponin-I 10/07/2022 0.019  0.000 - 0.045 ng/mL Final    Natriuretic Peptide 10/07/2022 83.7  <=100.0 pg/mL Final    Magnesium Level 10/07/2022 2.40  1.60 - 2.60 mg/dL Final    PTT 10/07/2022 40.3 (H)  23.4 - 33.9 seconds Final    SARS COV-2 MOLECULAR 10/07/2022 Negative  Negative Final    Neut Man 10/07/2022 28 (L)  47 - 80 % Final    Lymph Man 10/07/2022 62 (H)  13 - 40 % Final    Monocyte Man 10/07/2022 4  2 - 11 % Final    Eos Man 10/07/2022 6  0 - 8 % Final    Abs Neut calc 10/07/2022 1.316 (L)  2.1 - 9.2 x10(3)/mcL Final    Abs Mono 10/07/2022 0.188  0.1 - 1.3 x10(3)/mcL Final    Abs Lymp 10/07/2022 2.914  0.6 - 4.6 x10(3)/mcL Final    Abs Eos  10/07/2022 0.282  0 - 0.9 x10(3)/mcL Final    RBC Morph 10/07/2022 Abnormal (A)  Normal Final    Anisocyte 10/07/2022 1+ (A)  (none) Final    Poik 10/07/2022 1+ (A)  (none) Final    Platelet Est 10/07/2022 Adequate  Normal, Adequate Final       Imaging Results              CT Head Without Contrast (Final result)  Result time 10/07/22 14:51:11      Final result by Meir Alberts MD (10/07/22 14:51:11)                   Impression:      Chronic age-related changes with old areas of ischemia in the MCA distributions bilaterally    No acute process      Electronically signed by: Meir Alberts  Date:    10/07/2022  Time:    14:51               Narrative:    EXAMINATION:  CT HEAD WITHOUT CONTRAST    CLINICAL HISTORY:  Neuro deficit, acute, stroke  suspected;    TECHNIQUE:  Multiple axial images were obtained from the base of the brain to the vertex without contrast administration.  Sagittal and coronal reconstructions were performed..Automatic exposure control is utilized to reduce patient radiation exposure.    COMPARISON:  06/07/2022    FINDINGS:  There is no intracranial mass or lesion seen.  No hemorrhage is seen.  No acute infarct is seen.  There are old areas of ischemia seen in the right temporal and parietal lobe as well as the left anterior parietal region.  There is some cerebral atrophy seen.  There is some compensatory ventricular dilatation and periventricular white matter changes consistent with the patient's age.  Calvarium is intact.  The posterior fossa is intact.  The visualized portions of the paranasal sinuses show no acute abnormality.                                        ECG Results              ECG 12 lead (Preliminary result)  Result time 10/07/22 14:47:39      ED Interpretation by Brennon Frias DO (10/07/22 14:47:39, [facility] - Emergency Dept, Emergency Medicine)    Independent ECG Interpretation:    NSR at rate of 70. Normal intervals. Normal QRS. Nonspecific ST or T wave abnormalities. Overall impression: Abnormal                                                ED Course as of 10/07/22 1718   Fri Oct 07, 2022   1650 Dr. Frias will admit the patient to the hospital.  We discussed the care this far and patient's condition and results of testing.  Temporary bridge orders placed to expedite patient's transition of care.     [MP]   1650 Spoke with ISIDRO Quinonez, accepted admission [MP]      ED Course User Index  [MP] Brennon Frias DO                        Diagnostic Impression:    1. Stroke-like symptoms    2. Stroke    3. Aphasia         ED Disposition Condition    Transfer to Another Facility Stable                       Brennon Frias DO  10/07/22 1718

## 2022-10-07 NOTE — Clinical Note
Diagnosis: Stroke-like symptoms [735280]   Admitting Provider:: HEMA MACIAS [[id number]]   Future Attending Provider: HEMA MACIAS [[id number]]   Reason for IP Medical Treatment  (Clinical interventions that can only be accomplished in the IP setting? ) :: stroke work up   Estimated Length of Stay:: 2 midnights   I certify that Inpatient services for greater than or equal to 2 midnights are medically necessary:: Yes   Plans for Post-Acute care--if anticipated (pick the single best option):: A. No post acute care anticipated at this time

## 2022-10-08 NOTE — H&P
[facility] Hospital Medicine   History & Physical Note      Patient Name: Daina Kinsey  : 1940  MRN: [id number]  PCP: Aric Machado MD  Admitting Service: Hospital Medicine  Attending Physician: Samaria Bermeo MD  Admission Date: 10/7/2022 - IP- Inpatient   Length of Stay: 0  History source: EMR, patient and/or patient's family  Face-to-Face encounter date: 10/07/2022  Code status: Full    Chief Complaint   Aphasia    History of Present Illness   This is an 82-year-old female medical history notable for probable vascular dementia and recurrent CVAs with residual left-sided weakness, left facial droop and dysarthria with last stroke episode in 2022 with multiple ischemic/embolic infarcts involving right cerebral hemisphere CARLA/MCA distribution, she was maintained on Eliquis and Plavix.    Present to [facility] ED with her daughter reporting new onset aphasia that was noted today afternoon.  History is limited severe expressive aphasia.     On arrival to ED she was afebrile and hypertensive.  EKG normal sinus rhythm.  CT head showed no acute changes,  old areas of infarct in the MCA distribution bilaterally.    Transferred to [facility] and referred to hospital medicine service for further evaluation and management.    ROS   Except as documented, all other systems reviewed and negative     Past Medical History   Multiple CVAs-  left-sided weakness, left facial droop, dysarthria, and confusion  Last CVA 2022 workup:  MRI brain WO: right cerebral numerous acute nonhemorrhagic infarcts  CTA head/neck:  Moderate to severe stenosis of the cavernous, clinoid and supraclinoid segments of both ICA secondary to dense calcified atheromatous plaque. Focal short segment severe narrowing of the left proximal ICA distal to its origin. There is also small calcified atheromatous plaque in the distal cervical segment of the left ICA, at the level of foramen magnums. Chronic encephalomalacia  changes seen in the right temporoparietal and left frontal lobes.  ECHO: EF > 70%, grade 1 diastolic dysfunction, severe concentric LVH, normal LA, negative bubble study.   Carotid US: Calcified plaque with normal carotid arteries bilaterally. Stenosis within the left ICA reaches approximately 50%.  EKG/tele: no Afib  LDL: 133  A1c: 6.0  Discharged: to continue Eliquis/Plavix/statin, aspirin discontinued, phenytoin changed to Keppra    Bilateral LONG  PAD - right fem-tib bypass, right AKA, left iliac stent  CAD-obstructive per [facility] 2013  Probable vascular dementia  Seizure  HTN  HLD  COPD  Osteoarthritis    Past Surgical History   Right AKA  Right fem tib bypass  Left great toe amputation  Bilateral lower extremity stents  Multiple angiograms  Left heart catheterization    Social History     Social History     Tobacco Use    Smoking status: Former    Smokeless tobacco: Never   Substance Use Topics    Alcohol use: Never        Family History   Reviewed and negative    Allergies   Cephalexin    Home Medications     Prior to Admission medications    Medication Sig Start Date End Date Taking? Authorizing Provider   atorvastatin (LIPITOR) 20 MG tablet Take 1 tablet by mouth once daily at 6am. 5/16/22   Historical Provider   CARTIA  mg Cp24 Take 1 capsule by mouth once daily at 6am. 5/16/22   Historical Provider   cholecalciferol, vitamin D3, 1,250 mcg (50,000 unit) capsule Take 1 capsule by mouth every Sunday. 2/19/22   Historical Provider   clopidogreL (PLAVIX) 75 mg tablet Take 75 mg by mouth once daily.    Historical Provider   cyproheptadine (PERIACTIN) 4 mg tablet  5/16/22   Historical Provider   ELIQUIS 5 mg Tab Take 5 mg by mouth once daily. For 30 days 4/19/22   Historical Provider   ergocalciferol (ERGOCALCIFEROL) 50,000 unit Cap Take 1 capsule by mouth once a week. 4/19/22   Historical Provider   famotidine (PEPCID) 20 MG tablet Take 20 mg by mouth once daily. 3/29/22   Historical Provider   hydrALAZINE  (APRESOLINE) 50 MG tablet Take 50 mg by mouth 2 (two) times daily. 4/19/22   Historical Provider   isosorbide mononitrate (IMDUR) 60 MG 24 hr tablet Take 120 mg by mouth once daily. 3/26/22   Historical Provider   levETIRAcetam (KEPPRA) 250 MG Tab Take 1 tablet (250 mg total) by mouth 2 (two) times daily. 5/17/22 5/17/23  Ange Romero, RHODA   lisinopriL 10 MG tablet Take 10 mg by mouth once daily at 6am. 5/16/22   Historical Provider   metoprolol succinate (TOPROL-XL) 50 MG 24 hr tablet Take 1 tablet by mouth once daily at 6am. 5/16/22   Historical Provider   mirtazapine (REMERON) 15 MG tablet Take 15 mg by mouth nightly. 4/19/22   Historical Provider   montelukast (SINGULAIR) 10 mg tablet Take 1 tablet by mouth once daily at 6am. 5/14/22   Historical Provider   multivitamin/iron/folic acid (CENTRUM WOMEN ORAL) Take 1 tablet by mouth once daily at 6am. 12/4/21   Historical Provider   nicotine (NICODERM CQ) 21 mg/24 hr Place onto the skin. 2/7/22   Historical Provider   nitroGLYCERIN (NITROSTAT) 0.4 MG SL tablet as directed    Historical Provider   saw palmetto 160 MG capsule Take 160 mg by mouth 2 (two) times daily.    Historical Provider   senna-docusate 8.6-50 mg (PERICOLACE) 8.6-50 mg per tablet Take 1 tablet by mouth once daily.    Historical Provider   traMADoL (ULTRAM) 50 mg tablet Take 50 mg by mouth 3 (three) times daily as needed. 3/29/22   Historical Provider        Inpatient Medications   Scheduled Meds   aspirin  300 mg Rectal Daily    enoxaparin  1 mg/kg Subcutaneous Q12H    famotidine (PF)  20 mg Intravenous Daily    levetiracetam (KEPPRA) IVPB 100ml  250 mg Intravenous Q12H     Continuous Infusions    PRN Meds  acetaminophen, acetaminophen, aluminum-magnesium hydroxide-simethicone, labetalol, melatonin, ondansetron, polyethylene glycol, prochlorperazine, senna-docusate 8.6-50 mg, simethicone, sodium chloride 0.9%    Physical Exam   Vital Signs  Temp:  [97.8 °F (36.6 °C)-98.3 °F (36.8 °C)]   Pulse:   [68-75]   Resp:  [18-21]   BP: (156-191)/(65-80)   SpO2:  [94 %-99 %]    General: Appears comfortable  HEENT: NC/AT  Neck:  No JVD  Chest: CTABL  CVS: Regular rhythm. Normal S1/S2.  Abdomen: nondistended, normoactive BS, soft and non-tender.  MSK: Right AKA  Skin: Warm and dry  Neuro:  Arousable, expressive aphasia, left facial droop, left hemiparesis  Psych: Cooperative    Labs     Recent Labs     10/07/22  1514   WBC 4.7   RBC 3.73*   HGB 11.2*   HCT 36.3*   MCV 97.3*   MCH 30.0   MCHC 30.9*   RDW 14.7          Recent Labs     10/07/22  1514   INR 1.34*        Recent Labs     10/07/22  1514      K 4.2   CHLORIDE 110*   CO2 28   BUN 19.6   CREATININE 1.22*   GLUCOSE 83   CALCIUM 9.8   MG 2.40   ALBUMIN 3.5   GLOBULIN 4.0*   ALKPHOS 88   ALT 13   AST 22   BILITOT 0.3     Recent Labs     10/07/22  1514   BNP 83.7   TROPONINI 0.019          Microbiology Results (last 7 days)       ** No results found for the last 168 hours. **           Imaging     CT Head Without Contrast   Final Result      Chronic age-related changes with old areas of ischemia in the MCA distributions bilaterally      No acute process         Electronically signed by: Meir Alberts   Date:    10/07/2022   Time:    14:51      MRI BRAIN WITHOUT CONTRAST    (Results Pending)   CTA Head and Neck (xpd)    (Results Pending)     Assessment & Plan   Acute expressive aphasia ----- History of recurrent ischemic/embolic CVAs with residual left hemiparesis, dysarthria, and left facial droop  Bilateral carotid atherosclerotic disease  Probable vascular dementia  Hypertension    HX PAD, CAD, HLD, COPD, seizure disorder, osteoarthritis      Plan:    Recurrent bilateral ischemic infarct probable carotid embolic disease or atrial fibrillation, per daughter patient is compliant with her medications including Plavix and Eliquis.    MRI brain without contrast  CTA head and neck  TTE  EEG  Permissive HTN for 24 hrs --resume antihypertensives in  a.m.  Currently NPO/failed swallowing eval --- rectal aspirin 300 mg daily and enoxaparin 1mg/kg q12h and change keppra to IV until able to resume oral Meds including Plavix and Eliquis.  PT/OT/SLP evaluation  Neurology consult  D5-1/2NS at 50 ml/hr  VTE Prophylaxis:  SCD/Full-dose anticoagulation    Samaria Bermeo MD  Internal Medicine

## 2022-10-08 NOTE — PT/OT/SLP PROGRESS
Physical Therapy      Patient Name:  Daina Kinsey   MRN:  [id number]    Attempted PT eval. Spoke with nurse. Will wait for Neuro recs & MRI results. PT to f/u.

## 2022-10-08 NOTE — PT/OT/SLP PROGRESS
Attempted to see pt this AM for initial eval. Communicated w/ RN who confirmed that pt is still awaiting neuro consult and MRI results. OT will f/u lora as appropriate.

## 2022-10-08 NOTE — PROGRESS NOTES
[facility] Hospital Medicine Progress Note        Chief Complaint: Inpatient Follow-up    HPI:   82-year-old female with significant history of multiple CVAs with residual left-sided weakness, left facial droop, dysarthria and baseline confusion.  Latest CVA in February, 2022.  Bubble study negative.  Ultrasound carotid with less than 50% stenosis bilaterally.  No AFib.  Patient was then discharged on Eliquis, Plavix, statin.  Aspirin was discontinued.  Patient presented back to the ED with new onset aphasia-expressive.  Patient was afebrile, hypertensive in the ED.  EKG-NSR.  CT head was negative.  Did confirm the old infarcts.  Patient was transferred to our hospital for Neurology evaluation.  Stroke protocol initiated.  Workup ordered.  Neurology consulted.  Interval Hx:   Patient seen at bedside.  Comfortably laying in bed.  Blood pressure accelerated.  Otherwise hemodynamics stable.  Still continues with expressive aphasia.  Otherwise neurological status is at baseline.  Daughter is at bedside.  No acute events overnight.  She is NPO, daughter reports she accidentally gave her mashed red beans and rice today morning advised not to give any food pending clearance from speech.    Objective/physical exam:  General: In no acute distress, afebrile  Chest: Clear to auscultation bilaterally  Heart: S1, S2, no appreciable murmur  Abdomen: Soft, nontender, BS +  MSK: Warm, no lower extremity edema, no clubbing or cyanosis  Neurologic:  Alert, awake    VITAL SIGNS: 24 HRS MIN & MAX LAST   Temp  Min: 97.8 °F (36.6 °C)  Max: 98.3 °F (36.8 °C) 98.1 °F (36.7 °C)   BP  Min: 156/65  Max: 208/66 (!) 185/81     Pulse  Min: 63  Max: 82  70   Resp  Min: 16  Max: 23 (!) 23     SpO2  Min: 94 %  Max: 100 % 100 %       Recent Labs   Lab 10/08/22  0513   WBC 4.7   RBC 4.16*   HGB 12.4   HCT 39.8   MCV 95.7*   MCH 29.8   MCHC 31.2*   RDW 14.6      MPV 10.2         Recent Labs   Lab 10/08/22  0513       K 3.8   CO2 25   BUN 19.0   CREATININE 0.94   CALCIUM 9.6   MG 2.30   ALBUMIN 3.6   ALKPHOS 95   ALT 14   AST 25   BILITOT 0.4          Microbiology Results (last 7 days)       ** No results found for the last 168 hours. **             Scheduled Med:   aspirin  300 mg Rectal Daily    diltiaZEM  120 mg Oral Daily    enoxaparin  1 mg/kg Subcutaneous Q12H    famotidine (PF)  20 mg Intravenous Daily    hydrALAZINE  50 mg Oral BID    isosorbide mononitrate  120 mg Oral Daily    levetiracetam (KEPPRA) IVPB 100ml  250 mg Intravenous Q12H    lisinopriL  10 mg Oral Daily    metoprolol succinate  50 mg Oral Daily    mirtazapine  15 mg Oral Nightly    montelukast  10 mg Oral Daily    nicotine  1 patch Transdermal Daily          Assessment/Plan:    Acute onset expressive aphasia-ruled out new CVA  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  Poorly-controlled HTN  Vascular dementia  Mild bilateral carotid artery stenosis   History of PAT status post right fem-pop bypass, above knee amputation, left iliac stenting   History of CAD   Seizure disorder  HLD  COPD with no acute exacerbation   Osteoarthritis   Prophylaxis      MRI completed and has ruled out new CVA  Does confirm multiple old CVAs   Evaluated by speech, cleared for diet   Symptoms could have been secondary to mild dehydration   Patient on gentle hydration  Resume home Plavix, Eliquis  Neurology evaluated, no additional recommendations   Previous echocardiogram with negative bubble study   Awaiting repeat ultrasound carotid this hospitalization   Labs stable  BP accelerated, hopefully will be better after home meds resumed   Patient is on Cardizem, hydralazine, Imdur, lisinopril, Toprol XL  Also resumed other home meds-statin, Keppra, Remeron, montelukast, nicotine patch   DVT prophylaxis-Eliquis resumed    Family wishing nursing home placement  Consult case management Monday      Lluvia Alberto MD   10/08/2022

## 2022-10-08 NOTE — CONSULTS
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  She is currently at her baseline and very conversant    Vitals:    10/08/22 1151   BP: (!) 187/83   Pulse: (!) 122   Resp: 19   Temp: 97.6 °F (36.4 °C)     Past Medical History:   Diagnosis Date    Arthritis     Atherosclerosis     Cataracts, bilateral     COPD (chronic obstructive pulmonary disease)     Gallstones     HTN (hypertension)     PAD (peripheral artery disease)     Seizure     Stomach ulcer     Stroke     Vitamin D deficiency      Past Surgical History:   Procedure Laterality Date    CREATION OF FEMORAL-TIBIAL ARTERY BYPASS      INFERIOR VENA CAVA ANGIOPLASTY / STENTING      LEG AMPUTATION THROUGH KNEE      right    REPAIR OF CAROTID ARTERY      TOE AMPUTATION      left great toe     History reviewed. No pertinent family history.  Social History     Socioeconomic History    Marital status:    Tobacco Use    Smoking status: Every Day     Packs/day: 1.00     Types: Cigarettes    Smokeless tobacco: Never   Substance and Sexual Activity    Alcohol use: Never    Drug use: Never       Mental Status: Alert and oriented x3. Language is fluent with good comprehension.    Cranial Nerve: Pupils are equal, round, and reactive to light. Visual fields are intact to confrontation. Normal fundi. Ocular movements are intact. Face is symmetric at rest and with activation with intact sensation throughout. Hearing intact to finger rub bilaterally. Muscles of tongue and palate activate symmetrically. No dysarthria. Strength is full in sternocleidomastoid and trapezius bilaterally.    Motor: Muscle bulk and tone are normal. Strength is 5/5 in all four extremities both proximally and distally. Intact fine motor movements bilaterally.  There is no pronator drift or satelliting on arm roll.    Sensory: Sensation is intact to light touch, pinprick, vibration, and proprioception throughout. Romberg is negative.    Reflexes: 2+ and symmetric at the biceps, triceps, brachioradialis, patella, and Achilles bilaterally. Plantar response is flexor bilaterally.    Coordination: No dysmetria on finger-nose-finger or heel-knee-shin. Normal rapid alternating movements. Fast finger tapping with normal amplitude and speed.    Gait: Narrow based with normal stride length and good arm swing bilaterally. Able to walk on heels, toes, and in tandem.    Mri : No new stroke    A/p  Dehydration  Continue plavix/eliquis  NH placement desired by family  Signing off

## 2022-10-08 NOTE — PROGRESS NOTES
Inpatient Nutrition Assessment    Admit Date: 10/7/2022   Total duration of encounter: 1 day     Nutrition Recommendation/Prescription     - Continue current diet; texture per SLP.   - Ordered ONS Boost Very High Calorie (provides 530 kcal, 22 g protein per serving) - Already mildly thick    Communication of Recommendations: reviewed with patient/caregiver and reviewed with nurse    Nutrition Assessment     Malnutrition Assessment/Nutrition-Focused Physical Exam    Malnutrition in the context of acute illness or injury  Degree of Malnutrition: non-severe (moderate) malnutrition  Energy Intake: < 75% of estimated energy requirement for > 7 days  Interpretation of Weight Loss: does not meet criteria  Body Fat:mild depletion  Area of Body Fat Loss: orbital region  and upper arm region - triceps / biceps  Muscle Mass Loss: moderate depletion  Area of Muscle Mass Loss: clavicle bone region - pectoralis major, deltoid, trapezius muscles, dorsal hand - interosseous musle, and patellar region - quadricep muscle  Fluid Accumulation: does not meet criteria  Edema: not applicable   Reduced  Strength: unable to obtain  A minimum of two characteristics is recommended for diagnosis of either severe or non-severe malnutrition.    Chart Review    Reason Seen: malnutrition screening tool    Diagnosis:  Acute expressive aphasia ----- History of recurrent ischemic/embolic CVAs with residual left hemiparesis, dysarthria, and left facial droop  Bilateral carotid atherosclerotic disease  Probable vascular dementia  Hypertension      Relevant Medical History:   Multiple CVAs-  left-sided weakness, left facial droop, dysarthria, and confusion  Bilateral LONG  PAD - right fem-tib bypass, right AKA, left iliac stent  CAD-obstructive per [facility] 2013  Probable vascular dementia  Seizure  HTN  HLD  COPD  Osteoarthritis    Nutrition-Related Medications: Pepcid, D5W + NaCl  Calorie Containing IV Medications: no significant kcals from medications  "at this time    Nutrition-Related Labs:  10/8: Cl 110, GFR >60    Diet/PN Order: Diet Minced & Moist Cardiac  Oral Supplement Order: none at this time  Tube Feeding Order: none at this time  Appetite/Oral Intake: NPO at time of visit/not applicable  Factors Affecting Nutritional Intake: chewing difficulty, difficulty/impaired swallowing, and edentulous  Food/Gnosticist/Cultural Preferences: none reported    Wound(s): none noted    Comments    10/8: Seen for unsure wt loss. Pt in bed with daughter at bedside. Noted SLP evaluated this am, rec'd minced and moist diet with mildly thick liquids. Pt has not attempted PO intake yet, will monitor tolerance. Per daughter, pt eats fruit, eggs, grits, red beans and rice at home - usually with good intake except for last week or so. Offered ONS VHC- pt receptive. No significant wt loss noted.    Anthropometrics    Height: 5' 0.98" (154.9 cm) Height Method: Stated  Last Weight: 47.6 kg (104 lb 15 oz) (10/08/22 1225) Weight Method: Bed Scale  BMI (Calculated): 19.8  BMI Classification: normal (BMI 18.5-24.9)     Ideal Body Weight (IBW), Female: 104.9 lb     % Ideal Body Weight, Female (lb): 100.04 %        Usual Body Weight (UBW), k kg (98-105lb)  % Usual Body Weight: 103.69  % Weight Change From Usual Weight: 3.48 %  Usual Weight Provided By: family/caregiver    Wt Readings from Last 5 Encounters:   10/08/22 47.6 kg (104 lb 15 oz)   22 47.6 kg (105 lb)   22 45.4 kg (100 lb)   22 46.3 kg (102 lb)   19 46.6 kg (102 lb 11.8 oz)     Weight Change(s) Since Admission:   Admit Weight: 47.6 kg (105 lb) (10/07/22 1421)    Estimated Needs    Weight Used For Calorie Calculations: 47.6 kg (104 lb 15 oz)  Energy Calorie Requirements (kcal): 1316kcals (28kcal/kg)  Energy Need Method: Kcal/kg  Weight Used For Protein Calculations: 47.6 kg (104 lb 15 oz)  Protein Requirements: 57gm/kg (1.2gm/kg)  Fluid Requirements (mL): 1300mL  Temp: 97.6 °F (36.4 °C)       Enteral " Nutrition    Patient not receiving enteral nutrition at this time.    Parenteral Nutrition    Patient not receiving parenteral nutrition support at this time.    Evaluation of Received Nutrient Intake    Calories: not meeting estimated needs  Protein: not meeting estimated needs    Patient Education    Not applicable.    Nutrition Diagnosis     PES: Malnutrition related to stroke as evidenced by <75% estimated energy needs, physical evidence of mild muscle/fat wasting. (new)    Interventions/Goals     Intervention(s): modified composition of meals/snacks, commercial beverage, and collaboration with other providers  Goal: Meet greater than 75% of nutritional needs by follow-up. (new)    Monitoring & Evaluation     Dietitian will monitor food and beverage intake and weight change.  Nutrition Risk/Follow-Up: moderate (follow-up in 3-5 days)

## 2022-10-08 NOTE — PT/OT/SLP EVAL
"Speech Language Pathology Department  Clinical Swallow Evaluation    Patient Name:  Daina Kinsey   MRN:  [id number]  Admitting Diagnosis: Stroke    Recommendations:     General recommendations:  SLP follow up x1 for diet tolerance and dysphagia therapy  Diet recommendations:  Minced & Moist Diet - IDDSI Level 5, Liquid Diet Level: Thin liquids - IDDSI Level 0   Swallow strategies/precautions: small bites/sips, NO straws, and medications per patient preference  Precautions: Standard, aspiration, aphasia    History:     Past Medical History:   Diagnosis Date    Arthritis     Atherosclerosis     Cataracts, bilateral     COPD (chronic obstructive pulmonary disease)     Gallstones     HTN (hypertension)     PAD (peripheral artery disease)     Seizure     Stomach ulcer     Stroke     Vitamin D deficiency        Past Surgical History:   Procedure Laterality Date    CREATION OF FEMORAL-TIBIAL ARTERY BYPASS      INFERIOR VENA CAVA ANGIOPLASTY / STENTING      LEG AMPUTATION THROUGH KNEE      right    REPAIR OF CAROTID ARTERY      TOE AMPUTATION      left great toe       Chest X-Rays: N/A     Home Diet: Regular and thin liquids  Current Method of Nutrition: NPO    Patient complaint: "I want to eat"    Subjective     Patient alert, calm, and cooperative.    Patient goals: "To eat"    Pain/Comfort: Pain Rating 1: 0/10    Respiratory Status: Room air     Objective:     Oral Musculature Evaluation  Oral Musculature: general weakness, left weakness  Dentition: edentulous, uses dentures to eat  Secretion Management: adequate  Mucosal Quality: good  Mandibular Strength and Mobility: WFL  Oral Labial Strength and Mobility: other (see comments) (residueal L side weakness)  Lingual Strength and Mobility: impaired strength  Volitional Cough: present  Voice Prior to PO Intake: clear    Consistency Fed By Oral Symptoms Pharyngeal Symptoms   Ice chips SLP None None   Thin liquid by cup Self None None   Puree Self None None                 "             Assessment:     Orders received, chart reviewed, and nursing consulted. Pt with history of CVA, aphasia, and L sided residual weakness. Pt alert and answering/asking simple questions appropriately. Pt presented with no clinical signs/x of aspiration with any PO consistency. Pt reports owning dentures, however not present in room, therefore advanced diet textures were not assessed at this time. Dysphagia therapy is warranted for therapeutic trials for possible diet advancement, as tolerated.     Goals:   Multidisciplinary Problems       SLP Goals          Problem: SLP    Goal Priority Disciplines Outcome   SLP Goal     SLP    Description: LTG:  Pt will tolerate least restrictive diet with no clinical signs/x of aspiration     ST. Pt will toelrate PO trials soft and bite sized with no clinical signs/sx of aspiration                        Plan:     Patient to be seen:  5 x/week   Plan of Care expires:  22  Plan of Care reviewed with:  patient   SLP Follow-Up:  Yes      Time Tracking:     SLP Treatment Date:    10/09/2022  Speech Start Time:  0855  Speech Stop Time:  0915     Speech Total Time (min):  20 min    Billable minutes:  Swallow and Oral Function Evaluation, 20 minutes       10/08/2022

## 2022-10-09 NOTE — NURSING
Was consulted for a midline/ rns unable to get IV access/ arrived during RRT and pt reported to have low blood pressure/ RN's asked if I could get an IV/ 18 g easily started in left upper arm with ultrasound

## 2022-10-09 NOTE — NURSING
Pt's daughter at bedside, yelling at nurse saying the pt is not receiving any oxygen via nasal cannula, although nasal cannula is in pt's nose flowing at 2 L per minute. Nurse checks pt's tele monitor and oxygen saturation is 98%. Pt's daughter began yelling again stating the patient is having labored breathing, although pt's respiration were  between 18 to 20 breaths per minute. The nurse explained this to the patient's daughter. Daughter began yelling even louder walking closer to nurse screaming in her face, threatening to no the hospital. The nurse then explained  to the pt's daughter she would continue to monitor the patient closely and that her vitals are stabilizing. Pt's daughter continued yelling and treating the the nurse. Nurse then asked pt's daughter to stop yelling, she did not. Nurse called security and daughter was escorted out as nurse did not feel safe delivering care with the daughter at the bedside rendering threats.

## 2022-10-09 NOTE — PT/OT/SLP PROGRESS
Attempted to see pt at 1040 today for initial evaluation. Upon communicating w/ RN, pt had just been given medication to address increased agitation. RN requesting that OT hold on evaluation until PM.   Chart checked pt this PM to assess appropriateness for evaluation. Per chart, RRT called earlier today upon pt becoming unresponsive w/ significant hypotension. OT will hold today 2/2 medical instability and will f/u lora as appropriate.

## 2022-10-09 NOTE — PT/OT/SLP EVAL
Physical Therapy Evaluation    Patient Name:  Daina Kinsey   MRN:  [id number]    Recommendations:     Discharge Recommendations:  nursing facility, skilled   Discharge Equipment Recommendations: wheelchair   Barriers to discharge:  acuity of illness    Assessment:     Daina Kinsey is a 82 y.o. female admitted with a medical diagnosis of Stroke w/u, dehydration, h/o R AKA.  She presents with the following impairments/functional limitations:  weakness, impaired endurance, impaired functional mobility, impaired balance, impaired cognition, decreased lower extremity function, decreased upper extremity function, abnormal tone.    Rehab Prognosis: Fair; patient would benefit from acute skilled PT services to address these deficits and reach maximum level of function.    Recent Surgery: * No surgery found *      Plan:     During this hospitalization, patient to be seen 3 x/week to address the identified rehab impairments via therapeutic activities, therapeutic exercises, neuromuscular re-education, wheelchair management/training and progress toward the following goals:    Plan of Care Expires:  11/08/22    Subjective     Chief Complaint: none  Patient/Family Comments/goals: family would like pt to go to a SNF  Pain/Comfort:  Pain Rating 1: 0/10    Patients cultural, spiritual, Rastafarian conflicts given the current situation: no    Living Environment:  Pt lives w/ her sons in a  home, no steps to enter.  Prior to admission, patients level of function was assistance to tf to a WC.  Equipment used at home: wheelchair.  DME owned (not currently used): none.  Upon discharge, patient will have assistance from family.    Objective:     Communicated with RN prior to session.  Patient found HOB elevated with restraints, telemetry, SCD  upon PT entry to room.    General Precautions: Standard, fall   Orthopedic Precautions:N/A   Braces: N/A  Respiratory Status: Room air    Exams:  Cognitive Exam:  Patient is oriented to  Person    Functional Mobility:  Bed Mobility:     Supine to Sit: moderate assistance  Sit to Supine: moderate assistance  Transfers:     Sit to Stand:  moderate assistance with hand-held assist  Balance: good static sitting balance, maxA for standing balance         AM-PAC 6 CLICK MOBILITY  Total Score:10     Patient left HOB elevated with all lines intact, call button in reach, restraints reapplied at end of session, RN notified, and daughter present.    GOALS:   Multidisciplinary Problems       Physical Therapy Goals          Problem: Physical Therapy    Goal Priority Disciplines Outcome Goal Variances Interventions   Physical Therapy Goal     PT, PT/OT Ongoing, Progressing     Description: Goals to be met by: 2022     Patient will increase functional independence with mobility by performin. Supine to sit with Modified [place]  2. Sit to supine with Modified [place]  3. Sit to stand transfer with Contact Guard Assistance  4. Bed to chair transfer with Minimal Assistance using No Assistive Device  5. Wheelchair propulsion x500 feet with Stand-by Assistance using bilateral uppper extremities                         History:     Past Medical History:   Diagnosis Date    Arthritis     Atherosclerosis     Cataracts, bilateral     COPD (chronic obstructive pulmonary disease)     Gallstones     HTN (hypertension)     PAD (peripheral artery disease)     Seizure     Stomach ulcer     Stroke     Vitamin D deficiency        Past Surgical History:   Procedure Laterality Date    CREATION OF FEMORAL-TIBIAL ARTERY BYPASS      INFERIOR VENA CAVA ANGIOPLASTY / STENTING      LEG AMPUTATION THROUGH KNEE      right    REPAIR OF CAROTID ARTERY      TOE AMPUTATION      left great toe       Time Tracking:     PT Received On: 10/09/22  PT Start Time: 900     PT Stop Time: 912  PT Total Time (min): 12 min     Billable Minutes: Evaluation , high complexity      10/09/2022

## 2022-10-09 NOTE — PLAN OF CARE
Problem: Physical Therapy  Goal: Physical Therapy Goal  Description: Goals to be met by: 2022     Patient will increase functional independence with mobility by performin. Supine to sit with Modified [place]  2. Sit to supine with Modified [place]  3. Sit to stand transfer with Contact Guard Assistance  4. Bed to chair transfer with Minimal Assistance using No Assistive Device  5. Wheelchair propulsion x500 feet with Stand-by Assistance using bilateral uppper extremities    Outcome: Ongoing, Progressing

## 2022-10-09 NOTE — PROGRESS NOTES
[facility] Hospital Medicine Progress Note        Chief Complaint: Inpatient Follow-up    HPI:   82-year-old female with significant history of multiple CVAs with residual left-sided weakness, left facial droop, dysarthria and baseline confusion.  Latest CVA in February, 2022.  Bubble study negative.  Ultrasound carotid with less than 50% stenosis bilaterally.  No AFib.  Patient was then discharged on Eliquis, Plavix, statin.  Aspirin was discontinued.  Patient presented back to the ED with new onset aphasia-expressive.  Patient was afebrile, hypertensive in the ED.  EKG-NSR.  CT head was negative.  Did confirm the old infarcts.  Patient was transferred to our hospital for Neurology evaluation.  Stroke protocol initiated.  Workup ordered.  Neurology consulted.  MRI negative for CVA.  Symptoms most likely secondary to dehydration, on IV hydration.  Family requesting skilled nursing facility placement.  Home meds resumed as appropriate.  Neurology recommended to continue Eliquis, Plavix.      Interval Hx:   Patient seen at bedside.  Patient was very agitated today morning, trying to get out of the bed and was very uncooperative.  Haldol 5 mg x 1 dose given following which she became more,.  At the time of my rounds she was pleasantly confused, no more agitated.  Significantly hypertensive    Objective/physical exam:  General: In no acute distress, afebrile  Chest: Clear to auscultation bilaterally  Heart: S1, S2, no appreciable murmur  Abdomen: Soft, nontender, BS +  MSK: Warm, no lower extremity edema, no clubbing or cyanosis  Neurologic:  Pleasantly confused      VITAL SIGNS: 24 HRS MIN & MAX LAST   Temp  Min: 97.4 °F (36.3 °C)  Max: 98.6 °F (37 °C) 98.3 °F (36.8 °C)   BP  Min: 153/96  Max: 197/80 (!) 197/80     Pulse  Min: 77  Max: 122  90   Resp  Min: 16  Max: 19 18     SpO2  Min: 99 %  Max: 100 % 100 %       Recent Labs   Lab 10/09/22  0552   WBC 6.6   RBC 4.41   HGB 13.3   HCT 42.5    MCV 96.4*   MCH 30.2   MCHC 31.3*   RDW 14.1      MPV 10.5*         Recent Labs   Lab 10/08/22  0513 10/09/22  0552    147*   K 3.8 3.2*   CO2 25 28   BUN 19.0 12.4   CREATININE 0.94 0.99   CALCIUM 9.6 10.4*   MG 2.30  --    ALBUMIN 3.6 4.2   ALKPHOS 95 105   ALT 14 14   AST 25 28   BILITOT 0.4 0.4          Microbiology Results (last 7 days)       ** No results found for the last 168 hours. **             Scheduled Med:   apixaban  5 mg Oral Daily    atorvastatin  40 mg Oral Daily    clopidogreL  75 mg Oral Daily    diltiaZEM  120 mg Oral Daily    famotidine (PF)  20 mg Intravenous Daily    hydrALAZINE  75 mg Oral BID    isosorbide mononitrate  120 mg Oral Daily    levetiracetam  250 mg Oral BID    lisinopriL  20 mg Oral Daily    metoprolol succinate  50 mg Oral Daily    mirtazapine  15 mg Oral Nightly    montelukast  10 mg Oral Daily    nicotine  1 patch Transdermal Daily    potassium bicarbonate  50 mEq Oral Once          Assessment/Plan:    Acute onset expressive aphasia-ruled out new CVA  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  Poorly-controlled HTN  Mild free water deficit   Hypokalemia  Vascular dementia with intermittent agitation  Mild bilateral carotid artery stenosis   History of PAT status post right fem-pop bypass, above knee amputation, left iliac stenting   History of CAD   Seizure disorder  HLD  COPD with no acute exacerbation   Osteoarthritis   Prophylaxis      MRI completed and has ruled out new CVA  Does confirm multiple old CVAs   Evaluated by speech, cleared for diet as of 10/8  Symptoms could have been secondary to mild dehydration   Patient on gentle hydration, mild free water deficit noted and therefore I switched her to half-normal with 40 mEq potassium given associated hypokalemia  Also ordered p.o. potassium supplementation 50 mg x 1  Continue home Plavix, Eliquis  Neurology evaluated, no additional recommendations   Previous echocardiogram  with negative bubble study   Awaiting repeat ultrasound carotid this hospitalization   BP accelerated, most likely secondary to agitation, will continue close monitoring, continue home meds , utilize p.r.n. IV antihypertensives if needed    Patient is on Cardizem, hydralazine, Imdur, lisinopril, Toprol XL  Continue other home meds-statin, Keppra, Remeron, montelukast, nicotine patch   DVT prophylaxis-Eliquis       Family wishing nursing home placement  Consult case management Monday      Lluvia Alberto MD   10/09/2022        Update 12 noon  I was informed by the nursing staff that the patient's blood pressure dropped to systolic 70s with associated worsening mentation   Also reported the patient vomited all of her breakfast  RRT was called  I went &  immediately evaluated the patient   1 L fluid bolus was ordered which was running   Blood pressure already improved to systolic 90s   Patient very lethargic, drowsy, but still moving extremities, opened eyes  Mentation change/hypotension most likely medication induced secondary to Haldol and patient had received IV hydralazine  Will finish 1 L bolus and then switched to half-normal with potassium infusion  No more mood altering medications   Keep NPO  Ordered ABG, chest x-ray and also will obtain CT head  Discussed with family, patient is full code    Critical care time-35 minutes  Critical care diagnosis-hypotension requiring IV fluid resuscitation   Critical care interventions: Hands-on evaluation, review of labs/radiographs/records and discussions with patient's family    Update 12:50 p.m.  Chest x-ray concerning for aspiration pneumonia  Allergy to cephalosporin noted and therefore initiated Cipro, Flagyl

## 2022-10-10 PROBLEM — R29.90 STROKE-LIKE SYMPTOMS: Status: ACTIVE | Noted: 2022-01-01

## 2022-10-10 PROBLEM — R47.01 APHASIA: Status: ACTIVE | Noted: 2022-01-01

## 2022-10-10 NOTE — PROCEDURES
EEG    Dx: Altered mental status    Duration: 25 minutes    Technical: Standard digital EEG was performed at [facility]. The 10/20 international system of electrode placement was used.  Photic   stimulation was performed.    Description: The posterior dominant rhythm was 6 Hz.  There   were no lateralizing features.  drowsed.  There were   no epileptiform discharges or clinical seizures seen.    Impression: Mild background slowing, otherwise unremarkable EEG.

## 2022-10-10 NOTE — PLAN OF CARE
Problem: Adult Inpatient Plan of Care  Goal: Plan of Care Review  Outcome: Ongoing, Progressing  Goal: Patient-Specific Goal (Individualized)  Outcome: Ongoing, Progressing  Goal: Absence of Hospital-Acquired Illness or Injury  Outcome: Ongoing, Progressing  Goal: Optimal Comfort and Wellbeing  Outcome: Ongoing, Progressing     Problem: Adjustment to Illness (Stroke, Ischemic/Transient Ischemic Attack)  Goal: Optimal Coping  Outcome: Ongoing, Progressing     Problem: Cerebral Tissue Perfusion (Stroke, Ischemic/Transient Ischemic Attack)  Goal: Optimal Cerebral Tissue Perfusion  Outcome: Ongoing, Progressing     Problem: Cognitive Impairment (Stroke, Ischemic/Transient Ischemic Attack)  Goal: Optimal Cognitive Function  Outcome: Ongoing, Progressing     Problem: Communication Impairment (Stroke, Ischemic/Transient Ischemic Attack)  Goal: Improved Communication Skills  Outcome: Ongoing, Progressing     Problem: Functional Ability Impaired (Stroke, Ischemic/Transient Ischemic Attack)  Goal: Optimal Functional Ability  Outcome: Ongoing, Progressing     Problem: Sensorimotor Impairment (Stroke, Ischemic/Transient Ischemic Attack)  Goal: Improved Sensorimotor Function  Outcome: Ongoing, Progressing     Problem: Swallowing Impairment (Stroke, Ischemic/Transient Ischemic Attack)  Goal: Optimal Eating and Swallowing without Aspiration  Outcome: Ongoing, Progressing     Problem: Urinary Elimination Impaired (Stroke, Ischemic/Transient Ischemic Attack)  Goal: Effective Urinary Elimination  Outcome: Ongoing, Progressing     Problem: Infection  Goal: Absence of Infection Signs and Symptoms  Outcome: Ongoing, Progressing     Problem: Skin Injury Risk Increased  Goal: Skin Health and Integrity  Outcome: Ongoing, Progressing

## 2022-10-10 NOTE — PT/OT/SLP PROGRESS
Occupational Therapy      Patient Name:  Daina Kinsey   MRN:  [id number]    Chart reviewed. OT attempted at 1131 and at 1240 pt off of the floor for EEG. F/u as schedule permits.    10/10/2022

## 2022-10-10 NOTE — PROGRESS NOTES
[facility] Hospital Medicine Progress Note        Chief Complaint: Inpatient Follow-up    HPI:   82-year-old female with significant history of multiple CVAs with residual left-sided weakness, left facial droop, dysarthria and baseline confusion.  Latest CVA in February, 2022.  Bubble study negative.  Ultrasound carotid with less than 50% stenosis bilaterally.  No AFib.  Patient was then discharged on Eliquis, Plavix, statin.  Aspirin was discontinued.  Patient presented back to the ED with new onset aphasia-expressive.  Patient was afebrile, hypertensive in the ED.  EKG-NSR.  CT head was negative.  Did confirm the old infarcts.  Patient was transferred to our hospital for Neurology evaluation.  Stroke protocol initiated.  Workup ordered.  Neurology consulted.  MRI negative for CVA.  Symptoms most likely secondary to dehydration, on IV hydration.  Family requesting skilled nursing facility placement.  Home meds resumed as appropriate.  Neurology recommended to continue Eliquis, Plavix.  Patient was agitated on 10/09, gave her a dose of Haldol following which she had worsening mentation with lethargic, drowsiness and hypotension, hypotension improved with IV fluids.  Holding all mood altering drugs.  Placed on IV fluids and kept NPO.  CT head, ABG non impressive.  Chest x-ray consistent with aspiration pneumonia, initiated appropriate antibiotics-Cipro and Flagyl.  Patient has allergy to penicillin      Interval Hx:   Patient seen at bedside.  Comfortably laying in bed.  Hypertensive today, also occasionally tachycardic.  son at bedside.  Mentation waxes and wanes.  She was somnolent at the time of my rounds, but was arousable.  Per son she was talking to him early morning.    Objective/physical exam:  General: In no acute distress, afebrile  Chest: Clear to auscultation bilaterally  Heart: S1, S2, no appreciable murmur  Abdomen: Soft, nontender, BS +  MSK: Warm, no lower extremity  edema, no clubbing or cyanosis  Neurologic:  Somnolent, but easily arousable    VITAL SIGNS: 24 HRS MIN & MAX LAST   Temp  Min: 97.6 °F (36.4 °C)  Max: 99.2 °F (37.3 °C) 98.2 °F (36.8 °C)   BP  Min: 67/40  Max: 197/80 (!) 160/87     Pulse  Min: 79  Max: 121  (!) 111   Resp  Min: 17  Max: 31 18     SpO2  Min: 93 %  Max: 100 % 96 %       Recent Labs   Lab 10/10/22  0601   WBC 14.2*   RBC 4.62   HGB 13.9   HCT 45.1   MCV 97.6*   MCH 30.1   MCHC 30.8*   RDW 14.7      MPV 10.5*         Recent Labs   Lab 10/08/22  0513 10/09/22  0552 10/09/22  1254 10/09/22  1456 10/09/22  2053      < >  --    < > 144   K 3.8   < >  --    < > 4.3   CO2 25   < >  --    < > 19*   BUN 19.0   < >  --    < > 18.6   CREATININE 0.94   < >  --    < > 0.97   CALCIUM 9.6   < >  --    < > 9.0   PH  --   --  7.36  --   --    MG 2.30  --   --   --   --    ALBUMIN 3.6   < >  --    < > 3.5   ALKPHOS 95   < >  --    < > 95   ALT 14   < >  --    < > 19   AST 25   < >  --    < > 56*   BILITOT 0.4   < >  --    < > 0.3    < > = values in this interval not displayed.          Microbiology Results (last 7 days)       ** No results found for the last 168 hours. **             Scheduled Med:   apixaban  5 mg Oral Daily    atorvastatin  40 mg Oral Daily    ciprofloxacin  400 mg Intravenous Q12H    clopidogreL  75 mg Oral Daily    diltiaZEM  120 mg Oral Daily    famotidine (PF)  20 mg Intravenous Daily    hydrALAZINE  75 mg Oral BID    isosorbide mononitrate  120 mg Oral Daily    levetiracetam (KEPPRA) IVPB 100ml  250 mg Intravenous Q12H    lisinopriL  20 mg Oral Daily    metoprolol succinate  50 mg Oral Daily    metronidazole  500 mg Intravenous Q8H    mirtazapine  15 mg Oral Nightly    montelukast  10 mg Oral Daily    nicotine  1 patch Transdermal Daily    potassium bicarbonate  50 mEq Oral Once          Assessment/Plan:    Acute encephalopathy-medication induced secondary to Haldol  Aspiration PNA   Sepsis secondary to above   Hyperchloremic  metabolic acidosis  Acute onset expressive aphasia-ruled out new CVA  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  Poorly-controlled HTN  Vascular dementia with behavioral disturbance  Mild bilateral carotid artery stenosis   History of PAD status post right fem-pop bypass, above knee amputation, left iliac stenting   History of CAD   Seizure disorder  HLD  COPD with no acute exacerbation   Osteoarthritis   Prophylaxis      Encephalopathy persist even though slightly better compared to yesterday  Most likely this is secondary to Haldol   Repeat CT head today   CT angiogram was obtained yesterday-unremarkable   No hypercapnia  Patient had vomited yesterday prior to becoming less responsive  Chest x-ray consistent with aspiration pneumonia  On Cipro and Flagyl since she is allergic to penicillin  Leukocytosis noted  Also obtain UA, blood cultures  Keep NPO given encephalopathy  Continue D5 half   Hyperchloremic metabolic acidosis noted  Potassium corrected to normal  There were concerns for CVA on admit, ruled out with MRI  Does confirm multiple old CVAs   Resume home Plavix, Eliquis when she can tolerate p.o.  Neurology evaluated on admit, no additional recommendations   Previous echocardiogram with negative bubble study   Awaiting repeat ultrasound carotid this hospitalization   Hypertensive today, cannot tolerate p.o. given encephalopathy   Utilize p.r.n. IV antihypertensives  Other home meds-statin, Remeron, montelukast, nicotine patch also on hold since she is NPO  Continue IV Keppra  DVT prophylaxis-Eliquis to be resumed when she can not tolerate p.o.      Consult case management for skilled nursing facility placement when she is medically stable    Lluvia Alberto MD   10/10/2022

## 2022-10-10 NOTE — PT/OT/SLP PROGRESS
SLP attempting clinical swallowing evaluation, however pt lethargic and difficult to arouse. SLP with reduced secretion mgmt. PO intake remains unsafe. SLP to continue to follow and treat as appropriate.

## 2022-10-10 NOTE — PT/OT/SLP PROGRESS
Physical Therapy         Treatment        Daina Kinsey   MRN: [id number]     PT Received On: 10/10/22  PT Start Time: 927     PT Stop Time: 50    PT Total Time (min): 23 min       Billable Minutes:  Therapeutic Activity 23  Total Minutes: 23    Treatment Type: Treatment  PT/PTA: PTA     PTA Visit Number: 1       General Precautions: Standard, fall  Orthopedic Precautions: Orthopedic Precautions : N/A   Braces:      Spiritual, Cultural Beliefs, Confucianist Practices, Values that Affect Care: no      Objective:  Patient found in bed upon entry.    Functional Mobility:  Bed Mobility:   Supine to sit: Maximum Assistance   Sit to supine: Maximum Assistance   Rolling: Maximum Assistance   Scooting: Maximum Assistance    Balance:     Static Sit: Mod/Min A    Pt initially presents with poor trunk control but was able to sit with min A post standing trials.     Transitional Sit-to-stand: Max A with RW   CRESENCIO hand assisted to RW. L knee blocked    Static Standing: Max A with RW   Pt stood EOB X 3 trials.  Pt presents with forward flexed posture. Pt stood for approx 5 sec each trial.     Activity Tolerance:  Patient tolerated treatment well    Patient left HOB elevated with call button in reach.    Assessment:  Daina Kinsey is a 82 y.o. female with a medical diagnosis of Stroke.     Rehab potential is excellent.    Activity tolerance: Excellent    Discharge recommendations: Discharge Facility/Level of Care Needs: nursing facility, skilled     Equipment recommendations:       GOALS:   Multidisciplinary Problems       Physical Therapy Goals          Problem: Physical Therapy    Goal Priority Disciplines Outcome Goal Variances Interventions   Physical Therapy Goal     PT, PT/OT Ongoing, Progressing     Description: Goals to be met by: 2022     Patient will increase functional independence with mobility by performin. Supine to sit with Modified [place]  2. Sit to supine with Modified [place]  3. Sit to  stand transfer with Contact Guard Assistance  4. Bed to chair transfer with Minimal Assistance using No Assistive Device  5. Wheelchair propulsion x500 feet with Stand-by Assistance using bilateral uppper extremities                         PLAN:    Patient to be seen 3 x/week  to address the above listed problems via gait training, therapeutic activities, therapeutic exercises  Plan of Care expires: 11/08/22  Plan of Care reviewed with: patient         10/10/2022

## 2022-10-11 NOTE — PROGRESS NOTES
[facility] Hospital Medicine Progress Note        Chief Complaint: Inpatient Follow-up    HPI:   82-year-old female with significant history of multiple CVAs with residual left-sided weakness, left facial droop, dysarthria and baseline confusion.  Latest CVA in February, 2022.  Bubble study negative.  Ultrasound carotid with less than 50% stenosis bilaterally.  No AFib.  Patient was then discharged on Eliquis, Plavix, statin.  Aspirin was discontinued.  Patient presented back to the ED with new onset aphasia-expressive.  Patient was afebrile, hypertensive in the ED.  EKG-NSR.  CT head was negative.  Did confirm the old infarcts.  Patient was transferred to our hospital for Neurology evaluation.  Stroke protocol initiated.  Workup ordered.  Neurology consulted.  MRI negative for CVA.  Symptoms most likely secondary to dehydration, on IV hydration.  Family requesting skilled nursing facility placement.  Home meds resumed as appropriate.  Neurology recommended to continue Eliquis, Plavix.  Patient was agitated on 10/09, gave her a dose of Haldol following which she had worsening mentation with lethargic, drowsiness and hypotension, hypotension improved with IV fluids.  Holding all mood altering drugs.  Placed on IV fluids and kept NPO.  CT head, ABG non impressive.  Chest x-ray consistent with aspiration pneumonia, initiated appropriate antibiotics-Cipro and Flagyl.  Patient has allergy to penicillin.  Patient still lethargic and drowsy on 10/10.  Keeping NPO, on IV fluids      Interval Hx:   Patient seen at bedside.  Patient almost back to normal self.  She is awake, alert and is talking, but pleasantly confused.  Blood pressure accelerated.  Otherwise hemodynamics stable.  She is afebrile.      Objective/physical exam:  General: In no acute distress, afebrile  Chest: Clear to auscultation bilaterally  Heart: S1, S2, no appreciable murmur  Abdomen: Soft, nontender, BS +  MSK: Warm, no  lower extremity edema, no clubbing or cyanosis  Neurologic:  Somnolent, but easily arousable    VITAL SIGNS: 24 HRS MIN & MAX LAST   Temp  Min: 98.5 °F (36.9 °C)  Max: 99.5 °F (37.5 °C) 98.5 °F (36.9 °C)   BP  Min: 155/76  Max: 164/91 (!) 164/91     Pulse  Min: 110  Max: 120  (!) 120   Resp  Min: 18  Max: 18 18     SpO2  Min: 99 %  Max: 99 % 99 %       Recent Labs   Lab 10/11/22  0601   WBC 14.4*   RBC 3.85*   HGB 11.7*   HCT 36.8*   MCV 95.6*   MCH 30.4   MCHC 31.8*   RDW 14.8      MPV 10.3         Recent Labs   Lab 10/08/22  0513 10/09/22  0552 10/09/22  1254 10/09/22  1456 10/11/22  0601      < >  --    < > 141   K 3.8   < >  --    < > 3.7   CO2 25   < >  --    < > 23   BUN 19.0   < >  --    < > 19.7   CREATININE 0.94   < >  --    < > 0.90   CALCIUM 9.6   < >  --    < > 8.7   PH  --   --  7.36  --   --    MG 2.30  --   --   --   --    ALBUMIN 3.6   < >  --    < > 2.9*   ALKPHOS 95   < >  --    < > 75   ALT 14   < >  --    < > 12   AST 25   < >  --    < > 37*   BILITOT 0.4   < >  --    < > 0.4    < > = values in this interval not displayed.          Microbiology Results (last 7 days)       Procedure Component Value Units Date/Time    Blood Culture [[id number]] Collected: 10/10/22 0730    Order Status: Resulted Specimen: Blood Updated: 10/10/22 0753    Blood Culture [[id number]] Collected: 10/10/22 0730    Order Status: Resulted Specimen: Blood Updated: 10/10/22 0753             Scheduled Med:   apixaban  5 mg Oral Daily    atorvastatin  40 mg Oral Daily    ciprofloxacin  400 mg Intravenous Q12H    clopidogreL  75 mg Oral Daily    diltiaZEM  120 mg Oral Daily    famotidine (PF)  20 mg Intravenous Daily    hydrALAZINE  75 mg Oral BID    isosorbide mononitrate  120 mg Oral Daily    levetiracetam (KEPPRA) IVPB 100ml  250 mg Intravenous Q12H    lisinopriL  20 mg Oral Daily    metoprolol succinate  50 mg Oral Daily    metronidazole  500 mg Intravenous Q8H    mirtazapine  15 mg Oral Nightly    montelukast  10  mg Oral Daily    nicotine  1 patch Transdermal Daily          Assessment/Plan:    Acute encephalopathy-medication induced secondary to Haldol-improved, back to baseline  Aspiration PNA   Sepsis secondary to above   Acute onset expressive aphasia-ruled out new CVA  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  Poorly-controlled HTN  Vascular dementia with behavioral disturbance  Mild bilateral carotid artery stenosis   History of PAD status post right fem-pop bypass, above knee amputation, left iliac stenting   History of CAD   Seizure disorder  HLD  COPD with no acute exacerbation   Osteoarthritis   Prophylaxis      Encephalopathy improved and the patient is back to baseline   she is pleasantly confused at baseline  Will request ST to re-evaluate for oral diet/medication  Until ST clearance for oral intake will keep her on IV fluids-D5 half  Encephalopathy was most likely secondary to Haldol  Other workup including repeat CT negative  No hypercapnia  Continue Cipro, Flagyl for aspiration pneumonitis  She is allergic to penicillin   Leukocytosis persist  UA unremarkable   Blood cultures pending  There were concerns for CVA on admit, ruled out with MRI  Does confirm multiple old CVAs   Resume home Plavix, Eliquis when she can tolerate p.o.  Neurology evaluated on admit, no additional recommendations   Previous echocardiogram with negative bubble study   Awaiting repeat ultrasound carotid this hospitalization   Hypertensive today, cannot tolerate p.o. given encephalopathy   Utilize p.r.n. IV antihypertensives  Other home meds-statin, Remeron, montelukast, nicotine patch also on hold since she is NPO  Continue IV Keppra  DVT prophylaxis-Eliquis to be resumed when she can not tolerate p.o.      Case management consult for skilled nursing facility placement    Lluvia Alberto MD   10/11/2022

## 2022-10-11 NOTE — PLAN OF CARE
Problem: Occupational Therapy  Goal: Occupational Therapy Goal  Description: Goals to be met by: 10/25/2022     Patient will increase functional independence with ADLs by performing:    Feeding with Set-up Assistance.  UE Dressing with Moderate Assistance.  LE Dressing with Moderate Assistance.  Grooming while seated at sink with Minimal Assistance.  Toileting from bed level with Moderate Assistance for hygiene and clothing management.     Outcome: Ongoing, Progressing

## 2022-10-11 NOTE — PROCEDURES
Speech Language Pathology Department  Modified Barium Swallow Study    Patient Name:  Daina Kinsey   MRN:  [id number]  Diagnosis: Stroke     Recommendations:     General recommendations:  SLP intervention not indicated  Repeat MBS study: not warranted at this time  Diet recommendations:  Puree Diet - IDDSI Level 4, Liquid Diet Level: Mildly thick liquids - IDDSI Level 2   Swallow strategies/precautions: small bites/sips and slow rate 1:1 assist feed  General precautions: Standard, aspiration, aphasia    History:     A Modified Barium Swallow Study was completed to assess the efficiency of his/her swallow function, rule out aspiration and make recommendations regarding safe dietary consistencies, effective compensatory strategies, and safe eating environment.     Past Medical History:   Diagnosis Date    Arthritis     Atherosclerosis     Cataracts, bilateral     COPD (chronic obstructive pulmonary disease)     Gallstones     HTN (hypertension)     PAD (peripheral artery disease)     Seizure     Stomach ulcer     Stroke     Vitamin D deficiency        Home Diet: Regular and thin liquids  Current Method of Nutrition: NPO    Patient complaint: denies    Subjective:     Patient awake, uncooperative, and impulsive.    Fluoroscopic Results:     Oral Musculature Evaluation:  Oral Musculature: general weakness, left weakness  Dentition: edentulous, uses dentures to eat  Secretion Management: adequate  Mucosal Quality: good  Mandibular Strength and Mobility: WFL  Oral Labial Strength and Mobility: other (see comments) (residueal L side weakness)  Lingual Strength and Mobility: impaired strength  Volitional Cough: present  Voice Prior to PO Intake: clear    Visualization  Patient was seen in the lateral view    Oral Phase:   Bolus holding  Reduced bolus control    Pharyngeal Phase:   Swallow delay with spill to the valleculae  Reduced base of tongue retraction  Reduced hyolaryngeal excursion  Base of tongue residue     Consistency Laryngeal Penetration Aspiration Residue Strategy   Mildly thick liquid by spoon None None     Mildly thick liquid by straw None None     Thin liquid by straw None None     Puree None None       Pt required MAX cueing to accept bolus trials.       Assessment:     Pt presents with dysphagia characterized by bolus holding, reduced bolus control, swallow delay with spill to valleculae/pyriform sinuses, reduced base of tongue retraction, and reduced laryngeal excursion resulting in reduced airway protection with laryngeal penetration of thin liquids. While no aspiration was visualized, pt remains at HIGH risk of aspiration secondary to cognitive impairments.                                         Goals:   Multidisciplinary Problems       SLP Goals          Problem: SLP    Goal Priority Disciplines Outcome   SLP Goal     SLP    Description: LTG:  Pt will tolerate least restrictive diet with no clinical signs/x of aspiration (discontinue)    ST. Pt will toelrate PO trials soft and bite sized with no clinical signs/sx of aspiration (discontinue)                         Plan:     Patient to be seen:  5 x/week   Plan of Care expires:  22  Plan of Care reviewed with:  patient   SLP Follow-Up:  Yes      Time Tracking:     SLP Treatment Date:    10/11/22  Speech Start Time:   1420  Speech Stop Time:      1450  Speech Total Time (min):   30    Billable minutes: Motion Fluoroscopic Evaluation, Video Recording, 30        10/11/2022

## 2022-10-11 NOTE — PLAN OF CARE
Problem: Occupational Therapy  Goal: Occupational Therapy Goal  Description: Goals to be met by: 10/25/2022     Patient will increase functional independence with ADLs by performing:    Feeding with Set-up Assistance.  UE Dressing with Moderate Assistance.  LE Dressing with Moderate Assistance.  Grooming while seated at sink with Minimal Assistance.  Toileting from bed level with Maximum Assistance for hygiene and clothing management.     10/11/2022 1626 by Hui Pearl OT  Outcome: Ongoing, Progressing

## 2022-10-11 NOTE — PLAN OF CARE
Problem: Adult Inpatient Plan of Care  Goal: Plan of Care Review  Outcome: Ongoing, Progressing  Goal: Patient-Specific Goal (Individualized)  Outcome: Ongoing, Progressing  Goal: Absence of Hospital-Acquired Illness or Injury  Outcome: Ongoing, Progressing  Goal: Optimal Comfort and Wellbeing  Outcome: Ongoing, Progressing  Goal: Readiness for Transition of Care  Outcome: Ongoing, Progressing     Problem: Adjustment to Illness (Stroke, Ischemic/Transient Ischemic Attack)  Goal: Optimal Coping  Outcome: Ongoing, Progressing     Problem: Bowel Elimination Impaired (Stroke, Ischemic/Transient Ischemic Attack)  Goal: Effective Bowel Elimination  Outcome: Ongoing, Progressing     Problem: Cerebral Tissue Perfusion (Stroke, Ischemic/Transient Ischemic Attack)  Goal: Optimal Cerebral Tissue Perfusion  Outcome: Ongoing, Progressing     Problem: Cognitive Impairment (Stroke, Ischemic/Transient Ischemic Attack)  Goal: Optimal Cognitive Function  Outcome: Ongoing, Progressing     Problem: Communication Impairment (Stroke, Ischemic/Transient Ischemic Attack)  Goal: Improved Communication Skills  Outcome: Ongoing, Progressing     Problem: Functional Ability Impaired (Stroke, Ischemic/Transient Ischemic Attack)  Goal: Optimal Functional Ability  Outcome: Ongoing, Progressing     Problem: Respiratory Compromise (Stroke, Ischemic/Transient Ischemic Attack)  Goal: Effective Oxygenation and Ventilation  Outcome: Ongoing, Progressing     Problem: Sensorimotor Impairment (Stroke, Ischemic/Transient Ischemic Attack)  Goal: Improved Sensorimotor Function  Outcome: Ongoing, Progressing     Problem: Swallowing Impairment (Stroke, Ischemic/Transient Ischemic Attack)  Goal: Optimal Eating and Swallowing without Aspiration  Outcome: Ongoing, Progressing     Problem: Urinary Elimination Impaired (Stroke, Ischemic/Transient Ischemic Attack)  Goal: Effective Urinary Elimination  Outcome: Ongoing, Progressing     Problem: Infection  Goal:  Absence of Infection Signs and Symptoms  Outcome: Ongoing, Progressing     Problem: Skin Injury Risk Increased  Goal: Skin Health and Integrity  Outcome: Ongoing, Progressing

## 2022-10-11 NOTE — PT/OT/SLP EVAL
Occupational Therapy   Evaluation    Name: Daina Kinsey  MRN: [id number]  Admitting Diagnosis:  Stroke  Recent Surgery: * No surgery found *      Recommendations:     Discharge Recommendations: nursing facility, skilled, home with home health, home health OT  Discharge Equipment Recommendations:  none  Barriers to discharge:   (Family expressed desire for more help at home with care of mother.)    Assessment:     Daina Kinsey is a 82 y.o. female with a medical diagnosis of acute encephalopathy, aspiration PNA, and sepsis. Additionally, pt has a PMHx of multiple CVAs with residual left-sided weakness, vascular dementia with behavioral disturbance, and R AKA. During evaluation, pt responds normally to questions but is pleasantly confused. Pt lives with her son in a trailer with a ramp to enter. Per son, pt needs assistance with all mobility and self-care tasks at baseline but has recently needed more assistance. At time of eval, son reports family is not interested in nursing home placement but would like more assistance in the home with patients ADLs. Pt recommendation SNF placement if family is agreeable. Family will need training if if they decide to take patient home. Performance deficits affecting function: weakness, impaired endurance, impaired balance, decreased upper extremity function, decreased lower extremity function, impaired joint extensibility, impaired muscle length, impaired functional mobility, impaired self care skills, impaired cognition, decreased coordination, abnormal tone, decreased safety awareness.          Rehab Prognosis: Good; patient would benefit from acute skilled OT services to address these deficits and reach maximum level of function.       Plan:     Patient to be seen 3 x/week, 4 x/week to address the above listed problems via self-care/home management, therapeutic activities, therapeutic exercises  Plan of Care Expires: 10/25/22  Plan of Care Reviewed with:  "patient    Subjective     Chief Complaint: None stated  Patient/Family Comments/goals: "to get more help in the home"    Occupational Profile:  Living Environment: Pt lives with son in a trailer with ramp to enter. Primary bathroom is a tub/shower combo with a tub transfer bench.   Previous level of function: Pt needed assistance with all mobility and self-care tasks. Son report pt has needed more assistance than normal recently.   Roles and Routines: Pt spends most of day in w/c  Equipment Used at Home:  wheelchair  Assistance upon Discharge: family    Pain/Comfort:  Pain Rating 1: 0/10    Patients cultural, spiritual, Church conflicts given the current situation:      Objective:     Communicated with: Nrsg prior to session.  Patient found HOB elevated with telemetry upon OT entry to room.    General Precautions: Standard, fall   Orthopedic Precautions:N/A   Braces: N/A  Respiratory Status: Room air    Occupational Performance:    Bed Mobility:    Patient completed Supine to Sit with maximal assistance    Functional Mobility/Transfers:  Patient completed Bed <> Chair Transfer using Stand Pivot technique with maximal assistance with hand-held assist  Functional Mobility: n/a    Activities of Daily Living:  Grooming: moderate assistance to wash face seated in bedside chair. Pt attempts but terminates task before completing.   Toileting: not assessed this date. Per son, pt is incontinent.       Cognitive/Visual Perceptual:  Cognitive/Psychosocial Skills:     -       Oriented to: Person and Place   -       Follows Commands/attention:Follows one-step commands  Visual/Perceptual:      -Pt unable to follow commands for visual exams.     Physical Exam:  Balance:  - mod A/min A static sitting balance  Upper Extremity Range of Motion:     -       Right Upper Extremity: PROM/AROM intact  -       Left Upper Extremity: Contractures in all joints  Upper Extremity Strength:    -       Right Upper Extremity: generally 4-/5 " distally, 4/5 in shoulder  -       Left Upper Extremity: elbow flexion 2-/5, all other joints 0/5   **decreased accuracy of MMT due to patients difficulty following commands        Treatment & Education:  Family educated on OT POC.     Patient left up in chair with all lines intact, call button in reach, and nurse notified    GOALS:   Multidisciplinary Problems       Occupational Therapy Goals          Problem: Occupational Therapy    Goal Priority Disciplines Outcome Interventions   Occupational Therapy Goal     OT, PT/OT Ongoing, Progressing    Description: Goals to be met by: 10/25/2022     Patient will increase functional independence with ADLs by performing:    Feeding with Set-up Assistance.  UE Dressing with Moderate Assistance.  LE Dressing with Moderate Assistance.  Grooming while seated at sink with Minimal Assistance.  Toileting from bed level with Maximum Assistance for hygiene and clothing management.                          History:     Past Medical History:   Diagnosis Date    Arthritis     Atherosclerosis     Cataracts, bilateral     COPD (chronic obstructive pulmonary disease)     Gallstones     HTN (hypertension)     PAD (peripheral artery disease)     Seizure     Stomach ulcer     Stroke     Vitamin D deficiency          Past Surgical History:   Procedure Laterality Date    CREATION OF FEMORAL-TIBIAL ARTERY BYPASS      INFERIOR VENA CAVA ANGIOPLASTY / STENTING      LEG AMPUTATION THROUGH KNEE      right    REPAIR OF CAROTID ARTERY      TOE AMPUTATION      left great toe       Time Tracking:     OT Date of Treatment: 10/11/22  OT Start Time: 1007  OT Stop Time: 1031  OT Total Time (min): 24 min    Billable Minutes:Evaluation MOD 24 min    10/11/2022

## 2022-10-12 NOTE — PROGRESS NOTES
[facility]  Hospital Medicine Progress Note        Chief Complaint: Inpatient Follow-up    HPI:   82-year-old female with significant history of multiple CVAs with residual left-sided weakness, left facial droop, dysarthria and baseline confusion.  Latest CVA in February, 2022.  Bubble study negative.  Ultrasound carotid with less than 50% stenosis bilaterally.  No AFib.  Patient was then discharged on Eliquis, Plavix, statin.  Aspirin was discontinued.  Patient presented back to the ED with new onset aphasia-expressive.  Patient was afebrile, hypertensive in the ED.  EKG-NSR.  CT head was negative.  Did confirm the old infarcts.  Patient was transferred to our hospital for Neurology evaluation.  Stroke protocol initiated.  Workup ordered.  Neurology consulted.  MRI negative for CVA.  Symptoms most likely secondary to dehydration, on IV hydration.  Family requesting skilled nursing facility placement.  Home meds resumed as appropriate.  Neurology recommended to continue Eliquis, Plavix.  Patient was agitated on 10/09, gave her a dose of Haldol following which she had worsening mentation with lethargic, drowsiness and hypotension, hypotension improved with IV fluids.  Holding all mood altering drugs.  Placed on IV fluids and kept NPO.  CT head, ABG non impressive.  Chest x-ray consistent with aspiration pneumonia, initiated appropriate antibiotics-Cipro and Flagyl.  Patient has allergy to penicillin.  Patient still lethargic and drowsy on 10/10.  Keeping NPO, on IV fluids.  Mentation much better on 10/11.  Speech to re-evaluate to clear for p.o. intake.  IV fluids continued.      Interval Hx:   Patient seen at bedside.  Son at bedside.  Reports she was awake all night and therefore was sleeping at the time of my rounds.  She Later woke up.  Refusing meds, confused, but awake and alert and responding to questions.  She is close to her baseline.    Objective/physical exam:  General: In no  acute distress, afebrile  Chest: Clear to auscultation bilaterally  Heart: S1, S2, no appreciable murmur  Abdomen: Soft, nontender, BS +  MSK: Warm, no lower extremity edema, no clubbing or cyanosis  Neurologic:  Sleeping at the time of my rounds, later woke up, mentation at baseline  VITAL SIGNS: 24 HRS MIN & MAX LAST   Temp  Min: 97.8 °F (36.6 °C)  Max: 98.8 °F (37.1 °C) 98.1 °F (36.7 °C)   BP  Min: 109/61  Max: 167/75 136/63     Pulse  Min: 89  Max: 122  89   Resp  Min: 15  Max: 20 20     SpO2  Min: 91 %  Max: 100 % 96 %       Recent Labs   Lab 10/12/22  0543   WBC 11.4   RBC 3.32*   HGB 10.1*   HCT 31.6*   MCV 95.2*   MCH 30.4   MCHC 32.0*   RDW 14.6      MPV 11.3*         Recent Labs   Lab 10/08/22  0513 10/09/22  0552 10/09/22  1254 10/09/22  1456 10/12/22  0441      < >  --    < > 138   K 3.8   < >  --    < > 3.6   CO2 25   < >  --    < > 19*   BUN 19.0   < >  --    < > 15.2   CREATININE 0.94   < >  --    < > 0.88   CALCIUM 9.6   < >  --    < > 8.9   PH  --   --  7.36  --   --    MG 2.30  --   --   --   --    ALBUMIN 3.6   < >  --    < > 2.9*   ALKPHOS 95   < >  --    < > 72   ALT 14   < >  --    < > 12   AST 25   < >  --    < > 35*   BILITOT 0.4   < >  --    < > 0.6    < > = values in this interval not displayed.          Microbiology Results (last 7 days)       Procedure Component Value Units Date/Time    Blood Culture [[id number]]  (Normal) Collected: 10/10/22 0730    Order Status: Completed Specimen: Blood Updated: 10/11/22 1437     CULTURE, BLOOD ([facility]) No Growth At 24 Hours    Blood Culture [[id number]]  (Normal) Collected: 10/10/22 0730    Order Status: Completed Specimen: Blood Updated: 10/11/22 0205     CULTURE, BLOOD ([facility]) No Growth At 24 Hours             Scheduled Med:   apixaban  5 mg Oral Daily    atorvastatin  40 mg Oral Daily    ciprofloxacin  400 mg Intravenous Q12H    clopidogreL  75 mg Oral Daily    diltiaZEM  120 mg Oral Daily    famotidine (PF)  20 mg Intravenous Daily     hydrALAZINE  75 mg Oral BID    isosorbide mononitrate  120 mg Oral Daily    levetiracetam (KEPPRA) IVPB 100ml  250 mg Intravenous Q12H    lisinopriL  20 mg Oral Daily    metoprolol succinate  50 mg Oral Daily    metronidazole  500 mg Intravenous Q8H    mirtazapine  15 mg Oral Nightly    montelukast  10 mg Oral Daily    nicotine  1 patch Transdermal Daily          Assessment/Plan:    Acute encephalopathy-medication induced secondary to Haldol-improved, back to baseline  Aspiration PNA   Sepsis secondary to above -improved  Acute onset expressive aphasia-ruled out new CVA  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  Poorly-controlled HTN  Vascular dementia with behavioral disturbance  Mild bilateral carotid artery stenosis   History of PAD status post right fem-pop bypass, above knee amputation, left iliac stenting   History of CAD   Seizure disorder  HLD  COPD with no acute exacerbation   Osteoarthritis   Prophylaxis      Encephalopathy improved and the patient is back to baseline   she is pleasantly confused   Refuses medications at times   Speech re-evaluated 10/11 and she was cleared for modified diet  I will keep IV fluids for 24 more hours-D5 half  Free water deficit improved   Encephalopathy was most likely secondary to Haldol  Other workup including repeat CT negative  No hypercapnia  Continue Cipro, Flagyl for aspiration pneumonitis  She is allergic to penicillin   Leukocytosis resolved   UA unremarkable   Blood cultures so far negative  There were concerns for CVA on admit, ruled out with MRI  Does confirm multiple old CVAs   Continue home Plavix, Eliquis  Neurology evaluated on admit, no additional recommendations   Previous echocardiogram with negative bubble study   Ultrasound carotid was limited study secondary to patient not being cooperative  Still hypertensive secondary to her refusing medications  Utilize p.r.n.   Continue Other home meds-statin, Remeron, montelukast,  nicotine patch  Continue IV Keppra  DVT prophylaxis-Eliquis     Case management working with family as far as skilled nursing facility placement.  They have not made their choice      Lluvia Alberto MD   10/12/2022

## 2022-10-12 NOTE — PT/OT/SLP PROGRESS
Pt tolerating diet without signs/sx of aspiration. No skilled SLP intervention is warranted at this time.

## 2022-10-12 NOTE — PLAN OF CARE
10/12/22 0753   Discharge Assessment   Assessment Type Discharge Planning Assessment   Confirmed/corrected address, phone number and insurance Yes   Confirmed Demographics Correct on Facesheet   Source of Information family   Lives With child(zoraida), adult   Do you expect to return to your current living situation? Yes   Do you have help at home or someone to help you manage your care at home? Yes   Prior to hospitilization cognitive status: Alert/Oriented   Current cognitive status: Alert/Oriented   Walking or Climbing Stairs Difficulty ambulation difficulty, requires equipment   Mobility Management Wheelchair, walker   Dressing/Bathing Difficulty bathing difficulty, requires equipment   Dressing/Bathing Management shower chair, BSC   Home Accessibility wheelchair accessible   Home Layout Able to live on 1st floor   Equipment Currently Used at Home bedside commode;shower chair;walker, rolling;wheelchair   Readmission within 30 days? No   Patient currently being followed by outpatient case management? No   Do you currently have service(s) that help you manage your care at home? No   Do you take prescription medications? Yes   Do you have prescription coverage? Yes   Do you have any problems affording any of your prescribed medications? No   Is the patient taking medications as prescribed? yes   How do you get to doctors appointments? family or friend will provide   Are you on dialysis? No   Do you take coumadin? No   Discharge Plan A Skilled Nursing Facility   Discharge Plan B Skilled Nursing Facility   DME Needed Upon Discharge  none   Discharge Plan discussed with: Patient   Discharge Barriers Identified None   Assessment done via phone with daughter. Patient has been staying with children, patients home does have a ramp upon entrance. Patient has a prosthetic leg but uses wheelchair. Patients family have applied through insurance for long term sitters, application is still in process. Family to discuss SNF  options and call CM regarding decision. PCP: Dr. Machado, [facility]acy: [facility]

## 2022-10-13 NOTE — SUBJECTIVE & OBJECTIVE
Subjective:     Interval History: Per Hospitalist note, patient is sleepy today, only moaned in response to son telling her good morning.  Repeat CTh showed evolving ischemic changes in the left PCA distribution.  Neurology reconsulted for stroke workup.      Current Facility-Administered Medications   Medication Dose Route Frequency Provider Last Rate Last Admin    acetaminophen tablet 1,000 mg  1,000 mg Oral Q6H PRN Samaria Bermeo MD   1,000 mg at 10/08/22 2330    acetaminophen tablet 650 mg  650 mg Oral Q4H PRN Samaria Bermeo MD   650 mg at 10/12/22 1532    aluminum-magnesium hydroxide-simethicone 200-200-20 mg/5 mL suspension 30 mL  30 mL Oral QID PRN Samaria Bermeo MD        apixaban tablet 5 mg  5 mg Oral Daily Lluvia Alberto MD   5 mg at 10/11/22 1556    atorvastatin tablet 40 mg  40 mg Oral Daily Lluvia Alberto MD   40 mg at 10/11/22 1000    ciprofloxacin (CIPRO)400mg/200ml D5W IVPB 400 mg  400 mg Intravenous Q12H Lluvia Alberto  mL/hr at 10/13/22 0659 400 mg at 10/13/22 0659    clopidogreL tablet 75 mg  75 mg Oral Daily Lluvia Alberto MD   75 mg at 10/11/22 1556    dextrose 10% bolus 125 mL  12.5 g Intravenous PRN Cecilia Street, RHODA        dextrose 10% bolus 250 mL  25 g Intravenous PRN Cecilia Street, RHODA        dextrose 5 % and 0.45 % NaCl infusion   Intravenous Continuous Summershazia Ga MD        diltiaZEM 24 hr capsule 120 mg  120 mg Oral Daily Lluvia Alberto MD   120 mg at 10/11/22 1555    famotidine (PF) injection 20 mg  20 mg Intravenous Daily Samaria Bermeo MD   20 mg at 10/13/22 0900    glucagon (human recombinant) injection 1 mg  1 mg Intramuscular PRN RHODA Smith        hydrALAZINE injection 10 mg  10 mg Intravenous Q4H PRN Samaria Bermeo MD   10 mg at 10/11/22 2212    hydrALAZINE tablet 75 mg  75 mg Oral BID Lluvia Alberto MD   75 mg at 10/12/22 2002    insulin aspart U-100 injection 0-5 Units  0-5 Units Subcutaneous Q6H PRN Cecilia D Jad, FNP        isosorbide mononitrate 24 hr tablet 120  mg  120 mg Oral Daily Lluvia Alberto MD   120 mg at 10/11/22 1555    labetaloL injection 10 mg  10 mg Intravenous Q4H PRN Samaria Bermeo MD   10 mg at 10/13/22 0855    labetaloL injection 5 mg  5 mg Intravenous Q2H PRN Lluvia Alberto MD   5 mg at 10/09/22 1859    levETIRAcetam (KEPPRA) 250 mg in dextrose 5 % 100 mL IVPB  250 mg Intravenous Q12H Lluvia Alberto  mL/hr at 10/13/22 0900 250 mg at 10/13/22 0900    lisinopriL tablet 20 mg  20 mg Oral Daily Lluvia Alberto MD   20 mg at 10/11/22 1556    melatonin tablet 6 mg  6 mg Oral Nightly PRN Samaria Bermeo MD   6 mg at 10/08/22 2023    metoprolol succinate (TOPROL-XL) 24 hr tablet 50 mg  50 mg Oral Daily Lluvia Alberto MD   50 mg at 10/11/22 1556    metronidazole IVPB 500 mg  500 mg Intravenous Q8H Lluvia Alberto MD   Stopped at 10/13/22 0544    mirtazapine tablet 15 mg  15 mg Oral Nightly Lluvia Alberto MD   15 mg at 10/12/22 2003    montelukast tablet 10 mg  10 mg Oral Daily Lluvia Alberto MD   10 mg at 10/11/22 1556    nicotine 21 mg/24 hr 1 patch  1 patch Transdermal Daily Lluvia Alberto MD   1 patch at 10/13/22 0900    ondansetron injection 4 mg  4 mg Intravenous Q4H PRN Samaria Bermeo MD   4 mg at 10/09/22 2145    polyethylene glycol packet 17 g  17 g Oral BID PRN Samaria Bermeo MD        potassium bicarbonate disintegrating tablet 50 mEq  50 mEq Oral Once Summer Ga MD        prochlorperazine injection Soln 5 mg  5 mg Intravenous Q6H PRN Samaria Bermeo MD        senna-docusate 8.6-50 mg per tablet 1 tablet  1 tablet Oral BID PRN Samaria Bermeo MD        simethicone chewable tablet 80 mg  1 tablet Oral QID PRN Samaria Bermeo MD        sodium chloride 0.9% flush 10 mL  10 mL Intravenous PRN Brennon B Rodriguez, DO        sodium chloride 0.9% flush 10 mL  10 mL Intravenous PRN Summer Ga MD           Review of Systems  Patient off unit (ROS to follow by MD)    Objective:     Vital Signs (Most Recent):  Temp: 98.8 °F (37.1 °C) (10/13/22 0837)  Pulse: 81  (10/13/22 0837)  Resp: 16 (10/13/22 0436)  BP: (!) 162/72 (10/13/22 0837)  SpO2: 95 % (10/13/22 0837)   Vital Signs (24h Range):  Temp:  [98.4 °F (36.9 °C)-100.1 °F (37.8 °C)] 98.8 °F (37.1 °C)  Pulse:  [] 81  Resp:  [15-18] 16  SpO2:  [92 %-98 %] 95 %  BP: (129-178)/(58-78) 162/72     Weight: 47.6 kg (104 lb 15 oz)  Body mass index is 19.84 kg/m².    Physical Exam  Patient off unit (PE to follow by MD)    Significant Labs: BMP:   Recent Labs   Lab 10/12/22  0441 10/13/22  0435    138   K 3.6 2.7*   CO2 19* 19*   BUN 15.2 9.2*   CREATININE 0.88 0.80   CALCIUM 8.9 8.5     CBC:   Recent Labs   Lab 10/12/22  0543 10/13/22  0835   WBC 11.4 8.8   HGB 10.1* 10.3*   HCT 31.6* 31.4*    161       Significant Imaging: I have reviewed all pertinent imaging results/findings within the past 24 hours.    Stroke workup:  -CTh (10/7): chronic age-related changes with old areas of ischemia in the MCA distributions bilaterally  -MRI brain (10/8): No definite acute intracranial abnormality.  Advanced generalized involutional change.  Extensive severe chronic microvascular white matter ischemic change, and multiple moderate-sized remote infarcts involving the left frontal lobe and right parietal and temporal lobes.  -CTA h/n (10/9):  No abnormality of the intracranial vasculature.  No evidence for stenosis, occlusion, aneurysm, or thrombus.   -CUS: Bilateral CCA irregular calcific plaque extending into the bulb.  RT ICA moderate irregular calcific plaque with elevated velocities. LT ICA moderate/severe irregular calcific plaque with elevated velocities.  -LDL: 102  -A1c: 5.6  -home medications include Atorvastatin 20mg daily, Plavix 75mg daily, and Eliquis 5mg BID  -CTh (10/10): No acute intracranial abnormality identified.  Findings of unchanged chronic microvascular ischemic disease.   -CTh (10/13): Evolving ischemic changes in the left PCA territory without evidence of hemorrhagic transformation.

## 2022-10-13 NOTE — RESPIRATORY THERAPY
Rt called to 919 to do an ABG. Procedure explained to patients son as to why she needed this done and how the sample was to be obtained. Three unsuccessful attempts were made to obtain the abg. Patients son voiced his concerns for his mother being stuck so many times without any results and asked for us to stop.

## 2022-10-13 NOTE — HOSPITAL COURSE
(10/8/22) Back to baseline.  MRI brain negative for acute infarct.  Neurology s/o.  (10/9/22) Patient agitated; given Haldol 5mg x1 dose and agitation worsened. .... later that same day, patient had episode of hypotension which responded to fluid bolus.  ... mentation change and hypotension thought to be medication induced.  (10/10/22) Per Hospitalist note, patient lethargic.  EEG: mild background slowing, otherwise unremarkable EEG.  (10/11/22) Hospitalist notes encephalopathy improved, back to baseline.  Repeat CTh unremarkable.  (10/12/22) Hospitalist notes patient was awake all night, and therefore sleeping during the day.  Also noted patient refused medications and was more confused when awake.  (10/13/22) Patient sleepy, only moaned in response to son telling her good morning.  Repeat CTh showed evolving ischemic changes in the left PCA distribution.  (10/14/22) MRI brain showed acute infarct in the left PCA territory and right MCA territory with punctate focus of acute ischemia in the left frontal lobe and  Left occipital lobe petechial hemorrhage.

## 2022-10-13 NOTE — PT/OT/SLP PROGRESS
SLP reconsulted due to change in mental status.  SLP attempting to see pt for evaluation at approximately 1130, however pt off floor at time of attempt.  Discussed with neuro NP who recommends holding evaluation until further neuro recs are made.  Discussed with nursing at 1530, neuro MD has not yet rounded at this time. SLP to follow up tomorrow as appropriate.

## 2022-10-13 NOTE — ASSESSMENT & PLAN NOTE
Stroke - evolving ischemic changes left PCA    Awaiting MRI brain  Continue Eliquis and Plavix ....

## 2022-10-13 NOTE — PROGRESS NOTES
[facility]  Hospital Medicine Progress Note        Chief Complaint: Inpatient Follow-up    HPI:   82-year-old female with significant history of multiple CVAs with residual left-sided weakness, left facial droop, dysarthria and baseline confusion.  Latest CVA in February, 2022.  Bubble study negative.  Ultrasound carotid with less than 50% stenosis bilaterally.  No AFib.  Patient was then discharged on Eliquis, Plavix, statin.  Aspirin was discontinued.  Patient presented back to the ED with new onset aphasia-expressive.  Patient was afebrile, hypertensive in the ED.  EKG-NSR.  CT head was negative.  Did confirm the old infarcts.  Patient was transferred to our hospital for Neurology evaluation.  Stroke protocol initiated.  Workup ordered.  Neurology consulted.  MRI negative for CVA.  Symptoms most likely secondary to dehydration, on IV hydration.  Family requesting skilled nursing facility placement.  Home meds resumed as appropriate.  Neurology recommended to continue Eliquis, Plavix.  Patient was agitated on 10/09, gave her a dose of Haldol following which she had worsening mentation with lethargic, drowsiness and hypotension, hypotension improved with IV fluids.  Holding all mood altering drugs.  Placed on IV fluids and kept NPO.  CT head, ABG non impressive.  Chest x-ray consistent with aspiration pneumonia, initiated appropriate antibiotics-Cipro and Flagyl.  Patient has allergy to penicillin.  Patient still lethargic and drowsy on 10/10.  Keeping NPO, on IV fluids.  Mentation much better on 10/11.  Speech to re-evaluate to clear for p.o. intake.  IV fluids continued.      Interval Hx:     Patient seen and examined this morning with son bedside who reported that patient has been sleepy she just moan when I wished her good morning.  Later nursing staff came and was trying to wake her up she was still pretty sleepy but she pushed her.    Objective/physical exam:  General: In no  acute distress, afebrile sleepy did not open her eyes but moaned to her name  Chest: Clear to auscultation bilaterally  Heart: S1, S2, no appreciable murmur  Abdomen: Soft, nontender, BS +  MSK: Warm, no lower extremity edema, no clubbing or cyanosis  Neurologic:  Sleeping at the time of my rounds did not open her eyes but moaned to her names  VITAL SIGNS: 24 HRS MIN & MAX LAST   Temp  Min: 98.4 °F (36.9 °C)  Max: 100.1 °F (37.8 °C) 98.8 °F (37.1 °C)   BP  Min: 129/78  Max: 178/77 (!) 162/72     Pulse  Min: 81  Max: 110  81   Resp  Min: 15  Max: 18 16     SpO2  Min: 92 %  Max: 98 % 95 %       Recent Labs   Lab 10/13/22  0835   WBC 8.8   RBC 3.37*   HGB 10.3*   HCT 31.4*   MCV 93.2   MCH 30.6   MCHC 32.8*   RDW 14.5      MPV 10.6*         Recent Labs   Lab 10/08/22  0513 10/09/22  0552 10/09/22  1254 10/09/22  1456 10/12/22  0441 10/13/22  0435      < >  --    < > 138 138   K 3.8   < >  --    < > 3.6 2.7*   CO2 25   < >  --    < > 19* 19*   BUN 19.0   < >  --    < > 15.2 9.2*   CREATININE 0.94   < >  --    < > 0.88 0.80   CALCIUM 9.6   < >  --    < > 8.9 8.5   PH  --   --  7.36  --   --   --    MG 2.30  --   --   --   --   --    ALBUMIN 3.6   < >  --    < > 2.9*  --    ALKPHOS 95   < >  --    < > 72  --    ALT 14   < >  --    < > 12  --    AST 25   < >  --    < > 35*  --    BILITOT 0.4   < >  --    < > 0.6  --     < > = values in this interval not displayed.          Microbiology Results (last 7 days)       Procedure Component Value Units Date/Time    Blood Culture [[id number]]  (Normal) Collected: 10/10/22 0730    Order Status: Completed Specimen: Blood Updated: 10/13/22 0301     CULTURE, BLOOD ([facility]) No Growth At 48 Hours    Blood Culture [[id number]]  (Normal) Collected: 10/10/22 0730    Order Status: Completed Specimen: Blood Updated: 10/13/22 0301     CULTURE, BLOOD ([facility]) No Growth At 48 Hours             Scheduled Med:   apixaban  5 mg Oral Daily    atorvastatin  40 mg Oral Daily    ciprofloxacin  400  mg Intravenous Q12H    clopidogreL  75 mg Oral Daily    diltiaZEM  120 mg Oral Daily    famotidine (PF)  20 mg Intravenous Daily    hydrALAZINE  75 mg Oral BID    isosorbide mononitrate  120 mg Oral Daily    levetiracetam (KEPPRA) IVPB 100ml  250 mg Intravenous Q12H    lisinopriL  20 mg Oral Daily    metoprolol succinate  50 mg Oral Daily    metronidazole  500 mg Intravenous Q8H    mirtazapine  15 mg Oral Nightly    montelukast  10 mg Oral Daily    nicotine  1 patch Transdermal Daily    potassium bicarbonate  50 mEq Oral Once          Assessment/Plan:    Acute encephalopathy possibly secondary to left PCA CVA  Evolving ischemic changes in the left PCA distribution  Aspiration PNA   Sepsis secondary to above -improved  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  Poorly-controlled HTN  Vascular dementia with behavioral disturbance  Mild bilateral carotid artery stenosis   History of PAD status post right fem-pop bypass, above knee amputation, left iliac stenting   History of CAD   Seizure disorder  HLD  COPD with no acute exacerbation   Osteoarthritis   Prophylaxis      Since patient was very sleepy so I ordered CT of the head without contrast and that showed evolving ischemic changes in the left PCA distribution  Discussed with radiology this is something new since last MRI  We will re-consult Neurology, will order MRI of the brain   Permissive hypertension for now  CTA of the head and neck done on October 10 as such no large vessel occlusion or thrombus  We will reinitiate stroke protocol await neurology's recommendations  Previous carotid ultrasound was limited study I will repeat it again today  Will re-consult speech, consult PT and OT  Continue with IV fluids now D5 half-normal saline  Discussed with the nursing staff to check with Neurology to see if we can give Eliquis at this time  Had ordered ABGs to this morning but we were not able to get it discussed with the nursing staff to  order it again apparently patient's son is really concern as the needle is pretty big did tell him that we really need ABGs on her , we have also ordered EEG   Continue Cipro, Flagyl for aspiration pneumonitis as she is allergic to penicillin  White cell count is normal patient is afebrile blood cultures x2 have been negative  Neurology evaluated on admit, no additional recommendations   Previous echocardiogram with negative bubble study   Ultrasound carotid was limited study secondary to patient not being cooperative      Continue Other home meds-statin, Remeron, montelukast, nicotine patch  Continue IV Keppra      DVT prophylaxis-Eliquis       Patient's son who was in the room at the time of my visit had multiple questions went in the room 3 times today All questions were answered to the best of my knowledge patient's son wants to talk to neurologist also informed nursing supervisor who was with me when I told him about the stroke  He refused ABGs informed him that we really needed and we will try it in a little bit        Called the nursing staff agin    Neurology recommeded normal bp parmeter no permisssive htn since pt is npo they recommeded lovenox dull dose and that has been started   Willl start clonidine patch  Pt already has ? Aspiration and on abx so hold off on ng tube for now       Summer Ga MD   10/13/2022

## 2022-10-13 NOTE — RESPIRATORY THERAPY
RT called to obtain ABG. ABG education given to family member that was in the room at that time. Family member was very irate and getting loud with me. Three sperate RNS had to step in and ask him to call down. Two sperate attempts in sticking the pt, none in which were successful in obtaining arterial blood. Family member told me to stop and said that I could not attempt a third time. I call another RT for assistance.

## 2022-10-13 NOTE — PROGRESS NOTES
Inpatient Nutrition Assessment    Admit Date: 10/7/2022   Total duration of encounter: 6 days     Nutrition Recommendation/Prescription     - Oral diet as per SLP  - If unable to resume oral diet, recommend Peg placement and begin tube feeds. Recommend Isosource HN @ goal rate 55mL/hr to meet 100% est nutritional needs.     Communication of Recommendations: reviewed with patient/caregiver and reviewed with nurse    Nutrition Assessment     Malnutrition Assessment/Nutrition-Focused Physical Exam    Malnutrition in the context of acute illness or injury  Degree of Malnutrition: non-severe (moderate) malnutrition  Energy Intake: < 75% of estimated energy requirement for > 7 days  Interpretation of Weight Loss: does not meet criteria  Body Fat:mild depletion  Area of Body Fat Loss: orbital region  and upper arm region - triceps / biceps  Muscle Mass Loss: moderate depletion  Area of Muscle Mass Loss: clavicle bone region - pectoralis major, deltoid, trapezius muscles, dorsal hand - interosseous musle, and patellar region - quadricep muscle  Fluid Accumulation: does not meet criteria  Edema: not applicable   Reduced  Strength: unable to obtain  A minimum of two characteristics is recommended for diagnosis of either severe or non-severe malnutrition.    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, dysarthria, and left facial droop  Bilateral carotid atherosclerotic disease  Probable vascular dementia  Hypertension      Relevant Medical History:   Multiple CVAs-  left-sided weakness, left facial droop, dysarthria, and confusion  Bilateral LONG  PAD - right fem-tib bypass, right AKA, left iliac stent  CAD-obstructive per [facility] 2013  Probable vascular dementia  Seizure  HTN  HLD  COPD  Osteoarthritis    Nutrition-Related Medications: Cipro, flagyl  Calorie Containing IV Medications: no significant kcals from  "medications at this time    Nutrition-Related Labs:  10/8: Cl 110, GFR >60  10/13: H/H-10.3/31.4, K-2.7, Bun-9.2    Diet/PN Order: Diet NPO  Oral Supplement Order: none at this time  Tube Feeding Order: none at this time  Appetite/Oral Intake: NPO at time of visit/not applicable  Factors Affecting Nutritional Intake: chewing difficulty, difficulty/impaired swallowing, and edentulous  Food/Temple/Cultural Preferences: none reported    Wound(s): none noted    Comments    10/8: Seen for unsure wt loss. Pt in bed with daughter at bedside. Noted SLP evaluated this am, rec'd minced and moist diet with mildly thick liquids. Pt has not attempted PO intake yet, will monitor tolerance. Per daughter, pt eats fruit, eggs, grits, red beans and rice at home - usually with good intake except for last week or so. Offered ONS VHC- pt receptive. No significant wt loss noted.    10/13: Pt only mumbling during visit. Son at bedside. Pt now NPO. SLP re-consulted. Pt has been refusing medications. Some confusion noted as well. Pt may benefit from Peg placement if unable to resume oral diet due to mental status.     Anthropometrics    Height: 5' 0.98" (154.9 cm) Height Method: Stated  Last Weight: 47.6 kg (104 lb 15 oz) (10/08/22 1225) Weight Method: Bed Scale  BMI (Calculated): 19.8-Not appropriate due to Rt BKA  BMI Classification: normal (BMI 18.5-24.9)     Ideal Body Weight (IBW), Female: 104.9 lb     % Ideal Body Weight, Female (lb): 100.04 %        Usual Body Weight (UBW), k kg (98-105lb)  % Usual Body Weight: 103.69  % Weight Change From Usual Weight: 3.48 %  Usual Weight Provided By: family/caregiver    Wt Readings from Last 5 Encounters:   10/08/22 47.6 kg (104 lb 15 oz)   22 47.6 kg (105 lb)   22 45.4 kg (100 lb)   22 46.3 kg (102 lb)   19 46.6 kg (102 lb 11.8 oz)     Weight Change(s) Since Admission:   Admit Weight: 47.6 kg (105 lb) (10/07/22 1421)  10/13: 47.6kg    Estimated Needs    Weight Used " For Calorie Calculations: 47.6 kg (104 lb 15 oz)  Energy Calorie Requirements (kcal): 1316kcals (28kcal/kg)  Energy Need Method: Kcal/kg  Weight Used For Protein Calculations: 47.6 kg (104 lb 15 oz)  Protein Requirements: 57gm/kg (1.2gm/kg)  Fluid Requirements (mL): 1300mL  Temp: 98.8 °F (37.1 °C)       Enteral Nutrition    Patient not receiving enteral nutrition at this time.    Parenteral Nutrition    Patient not receiving parenteral nutrition support at this time.    Evaluation of Received Nutrient Intake    Calories: not meeting estimated needs  Protein: not meeting estimated needs    Patient Education    Not applicable.    Nutrition Diagnosis     PES: Malnutrition related to stroke as evidenced by <75% estimated energy needs, physical evidence of mild muscle/fat wasting. (continues)    Interventions/Goals     Intervention(s): modified composition of meals/snacks, modified composition of enteral nutrition, commercial beverage, and collaboration with other providers  Goal: Meet greater than 75% of nutritional needs by follow-up. (goal not met)    Monitoring & Evaluation     Dietitian will monitor food and beverage intake, enteral nutrition intake, and weight change.  Nutrition Risk/Follow-Up: high (follow-up in 1-4 days)

## 2022-10-13 NOTE — RESPIRATORY THERAPY
"RT called to room 919 @ 9am this morning for an ABG. Explained to son the procedure and why we were performing it. Proceeded to attempt for ABG twice, was unsuccessful due to hypotension and dehydration. Made RN aware. Son hovered over myself and other RT present and said to "remove that needle."  "

## 2022-10-13 NOTE — CONSULTS
[facility] - 9th Floor Med Surg  Neurology  Consult Note    Patient Name: Daina Kinsey  MRN: [id number]  Admission Date: 10/7/2022  Hospital Length of Stay: 6 days  Code Status: Full Code   Attending Provider: Lluvia Alberto MD   Consulting Provider: RHODA Bowers  Primary Care Physician: Aric Machado MD  Principal Problem:Stroke    Inpatient consult to Vascular (Stroke) Neurology  Consult performed by: RHODA Bowers  Consult ordered by: Summer Ga MD         Subjective:     Chief Complaint:    Chief Complaint   Patient presents with    Aphasia          HPI:   Daina Kinsey is a 82 y.o. female who presented to [facility] on 10/7/2022 with reports of aphasia.  LKN 10/7 at 7:30am.  HTN upon ED arrival.  CTh negative.  Transferred to [facility] for stroke workup.    Past medical history of vascular dementia, stroke (left sided weakness, left facial droop, confusion), on Eliquis and Plavix 2/2 multiple territory strokes, arthritis, bilateral cataracts, COPD, HTN, PAD s/p right AKA, CAD s/p stents, seizure.    (10/8/22) Back to baseline.  MRI brain negative for acute infarct.  Neurology s/o.  (10/9/22) Patient agitated; given Haldol 5mg x1 dose and agitation worsened. .... later that same day, patient had episode of hypotension which responded to fluid bolus.  ... mentation change and hypotension thought to be medication induced.  (10/10/22) Per Hospitalist note, patient lethargic.  EEG: mild background slowing, otherwise unremarkable EEG.  (10/11/22) Hospitalist notes encephalopathy improved, back to baseline.  Repeat CTh unremarkable.  (10/12/22) Hospitalist notes patient was awake all night, and therefore sleeping during the day.  Also noted patient refused medications and was more confused when awake.        Subjective:     Interval History: Per Hospitalist note, patient is sleepy today, only moaned in response to son telling her good morning.  Repeat CTh showed evolving  ischemic changes in the left PCA distribution.  Neurology reconsulted for stroke workup.      Current Facility-Administered Medications   Medication Dose Route Frequency Provider Last Rate Last Admin    acetaminophen tablet 1,000 mg  1,000 mg Oral Q6H PRN Samaria Bermeo MD   1,000 mg at 10/08/22 2330    acetaminophen tablet 650 mg  650 mg Oral Q4H PRN Samaria Bermeo MD   650 mg at 10/12/22 1532    aluminum-magnesium hydroxide-simethicone 200-200-20 mg/5 mL suspension 30 mL  30 mL Oral QID PRN Samaria Bermeo MD        apixaban tablet 5 mg  5 mg Oral Daily Lluvia Alberto MD   5 mg at 10/11/22 1556    atorvastatin tablet 40 mg  40 mg Oral Daily Lluvia Alberto MD   40 mg at 10/11/22 1000    ciprofloxacin (CIPRO)400mg/200ml D5W IVPB 400 mg  400 mg Intravenous Q12H Lluvia Alberto  mL/hr at 10/13/22 0659 400 mg at 10/13/22 0659    clopidogreL tablet 75 mg  75 mg Oral Daily Lluvia Alberto MD   75 mg at 10/11/22 1556    dextrose 10% bolus 125 mL  12.5 g Intravenous PRN Cecilia Street, RHODA        dextrose 10% bolus 250 mL  25 g Intravenous PRN Cecilia [address], RHODA        dextrose 5 % and 0.45 % NaCl infusion   Intravenous Continuous Summershazia Ga MD        diltiaZEM 24 hr capsule 120 mg  120 mg Oral Daily Lluvia Alberto MD   120 mg at 10/11/22 1555    famotidine (PF) injection 20 mg  20 mg Intravenous Daily Samaria Bermeo MD   20 mg at 10/13/22 0900    glucagon (human recombinant) injection 1 mg  1 mg Intramuscular PRN RHODA Smith        hydrALAZINE injection 10 mg  10 mg Intravenous Q4H PRN Samaria Bermeo MD   10 mg at 10/11/22 2212    hydrALAZINE tablet 75 mg  75 mg Oral BID Lluvia Alberto MD   75 mg at 10/12/22 2002    insulin aspart U-100 injection 0-5 Units  0-5 Units Subcutaneous Q6H PRN RHODA Smith        isosorbide mononitrate 24 hr tablet 120 mg  120 mg Oral Daily Lluvia Alberto MD   120 mg at 10/11/22 1555    labetaloL injection 10 mg  10 mg Intravenous Q4H PRN Samaria Bermeo MD   10 mg at  10/13/22 0855    labetaloL injection 5 mg  5 mg Intravenous Q2H PRN Lluvia Alberto MD   5 mg at 10/09/22 1859    levETIRAcetam (KEPPRA) 250 mg in dextrose 5 % 100 mL IVPB  250 mg Intravenous Q12H Lluvia Alberto  mL/hr at 10/13/22 0900 250 mg at 10/13/22 0900    lisinopriL tablet 20 mg  20 mg Oral Daily Lluvia Alberto MD   20 mg at 10/11/22 1556    melatonin tablet 6 mg  6 mg Oral Nightly PRN Samaria Bermeo MD   6 mg at 10/08/22 2023    metoprolol succinate (TOPROL-XL) 24 hr tablet 50 mg  50 mg Oral Daily Lluvia Alberto MD   50 mg at 10/11/22 1556    metronidazole IVPB 500 mg  500 mg Intravenous Q8H Lluvia Alberto MD   Stopped at 10/13/22 0544    mirtazapine tablet 15 mg  15 mg Oral Nightly Lluvia Alberto MD   15 mg at 10/12/22 2003    montelukast tablet 10 mg  10 mg Oral Daily Lluvia Alberto MD   10 mg at 10/11/22 1556    nicotine 21 mg/24 hr 1 patch  1 patch Transdermal Daily Lluvia Alberto MD   1 patch at 10/13/22 0900    ondansetron injection 4 mg  4 mg Intravenous Q4H PRN Samaria Bermeo MD   4 mg at 10/09/22 2145    polyethylene glycol packet 17 g  17 g Oral BID PRN Samaria Bermeo MD        potassium bicarbonate disintegrating tablet 50 mEq  50 mEq Oral Once Summer Ga MD        prochlorperazine injection Soln 5 mg  5 mg Intravenous Q6H PRN Samaria Bermeo MD        senna-docusate 8.6-50 mg per tablet 1 tablet  1 tablet Oral BID PRN Samaria Bermeo MD        simethicone chewable tablet 80 mg  1 tablet Oral QID PRN Samaria Bermeo MD        sodium chloride 0.9% flush 10 mL  10 mL Intravenous PRN Brennon GREENWOOD Rodriguez,         sodium chloride 0.9% flush 10 mL  10 mL Intravenous PRN Summer Ga MD           Review of Systems  Patient off unit (ROS to follow by MD)    Objective:     Vital Signs (Most Recent):  Temp: 98.8 °F (37.1 °C) (10/13/22 0837)  Pulse: 81 (10/13/22 0837)  Resp: 16 (10/13/22 0436)  BP: (!) 162/72 (10/13/22 0837)  SpO2: 95 % (10/13/22 0837)   Vital Signs (24h Range):  Temp:   [98.4 °F (36.9 °C)-100.1 °F (37.8 °C)] 98.8 °F (37.1 °C)  Pulse:  [] 81  Resp:  [15-18] 16  SpO2:  [92 %-98 %] 95 %  BP: (129-178)/(58-78) 162/72     Weight: 47.6 kg (104 lb 15 oz)  Body mass index is 19.84 kg/m².    Physical Exam  Patient off unit (PE to follow by MD)    Significant Labs: BMP:   Recent Labs   Lab 10/12/22  0441 10/13/22  0435    138   K 3.6 2.7*   CO2 19* 19*   BUN 15.2 9.2*   CREATININE 0.88 0.80   CALCIUM 8.9 8.5     CBC:   Recent Labs   Lab 10/12/22  0543 10/13/22  0835   WBC 11.4 8.8   HGB 10.1* 10.3*   HCT 31.6* 31.4*    161       Significant Imaging: I have reviewed all pertinent imaging results/findings within the past 24 hours.    Stroke workup:  -CTh (10/7): chronic age-related changes with old areas of ischemia in the MCA distributions bilaterally  -MRI brain (10/8): No definite acute intracranial abnormality.  Advanced generalized involutional change.  Extensive severe chronic microvascular white matter ischemic change, and multiple moderate-sized remote infarcts involving the left frontal lobe and right parietal and temporal lobes.  -CTA h/n (10/9):  No abnormality of the intracranial vasculature.  No evidence for stenosis, occlusion, aneurysm, or thrombus.   -CUS: Bilateral CCA irregular calcific plaque extending into the bulb.  RT ICA moderate irregular calcific plaque with elevated velocities. LT ICA moderate/severe irregular calcific plaque with elevated velocities.  -LDL: 102  -A1c: 5.6  -home medications include Atorvastatin 20mg daily, Plavix 75mg daily, and Eliquis 5mg BID  -CTh (10/10): No acute intracranial abnormality identified.  Findings of unchanged chronic microvascular ischemic disease.   -CTh (10/13): Evolving ischemic changes in the left PCA territory without evidence of hemorrhagic transformation.     Assessment and Plan:     * Stroke  Stroke - evolving ischemic changes left PCA    Awaiting MRI brain  Continue Eliquis and Plavix .... patient has  been refusing medications  Normal blood pressure        Thank you for your consult. Further recommendations to follow by MD.    RHODA Bowers  Neurology  [facility] - 9th Floor Med Surg

## 2022-10-13 NOTE — HPI
Daina Kinsey is a 82 y.o. female who presented to [facility] on 10/7/2022 with reports of aphasia.  LKN 10/7 at 7:30am.  HTN upon ED arrival.  CTh negative.  Transferred to [facility] for stroke workup.    Past medical history of vascular dementia, stroke (left sided weakness, left facial droop, confusion), on Eliquis and Plavix 2/2 multiple territory strokes, arthritis, bilateral cataracts, COPD, HTN, PAD s/p right AKA, CAD s/p stents, seizure.

## 2022-10-13 NOTE — PT/OT/SLP PROGRESS
Occupational Therapy      Patient Name:  Daina Kinsey   MRN:  [id number]    Patient not seen today secondary to hold per decline in function. Will follow-up tomorrow as appropriate.    10/13/2022

## 2022-10-14 NOTE — PROGRESS NOTES
[facility]  Hospital Medicine Progress Note        Chief Complaint: Inpatient Follow-up    HPI:   82-year-old female with significant history of multiple CVAs with residual left-sided weakness, left facial droop, dysarthria and baseline confusion.  Latest CVA in February, 2022.  Bubble study negative.  Ultrasound carotid with less than 50% stenosis bilaterally.  No AFib.  Patient was then discharged on Eliquis, Plavix, statin.  Aspirin was discontinued.  Patient presented back to the ED with new onset aphasia-expressive.  Patient was afebrile, hypertensive in the ED.  EKG-NSR.  CT head was negative.  Did confirm the old infarcts.  Patient was transferred to our hospital for Neurology evaluation.  Stroke protocol initiated.  Workup ordered.  Neurology consulted.  MRI negative for CVA.  Symptoms most likely secondary to dehydration, on IV hydration.  Family requesting skilled nursing facility placement.  Home meds resumed as appropriate.  Neurology recommended to continue Eliquis, Plavix.  Patient was agitated on 10/09, gave her a dose of Haldol following which she had worsening mentation with lethargic, drowsiness and hypotension, hypotension improved with IV fluids.  Holding all mood altering drugs.  Placed on IV fluids and kept NPO.  CT head, ABG non impressive.  Chest x-ray consistent with aspiration pneumonia, initiated appropriate antibiotics-Cipro and Flagyl.  Patient has allergy to penicillin.  Patient still lethargic and drowsy on 10/10.  Keeping NPO, on IV fluids.  Mentation much better on 10/11.  Speech to re-evaluate to clear for p.o. intake.  IV fluids continued.      Interval Hx:    Patient seen and examined with patient's son bedside who reported that this morning patient woke up and was talking to him but at the time of my visit she just opens her eyes and went back to sleep      Objective/physical exam:  General: In no acute distress, afebrile sleepy did open her eyes    Chest: Clear to auscultation bilaterally  Heart: S1, S2, no appreciable murmur  Abdomen: Soft, nontender, BS +  MSK: Warm, no lower extremity edema, no clubbing or cyanosis  Neurologic:  Sleeping at the time of my rounds  did open her eyes  VITAL SIGNS: 24 HRS MIN & MAX LAST   Temp  Min: 98.1 °F (36.7 °C)  Max: 99.7 °F (37.6 °C) 99.7 °F (37.6 °C)   BP  Min: 136/73  Max: 175/72 136/73     Pulse  Min: 94  Max: 101  101   Resp  Min: 16  Max: 17 16     SpO2  Min: 93 %  Max: 100 % 100 %       Recent Labs   Lab 10/14/22  0356   WBC 8.2   RBC 3.36*   HGB 10.2*   HCT 31.4*   MCV 93.5   MCH 30.4   MCHC 32.5*   RDW 14.6      MPV 10.3         Recent Labs   Lab 10/08/22  0513 10/09/22  0552 10/09/22  1254 10/09/22  1456 10/12/22  0441 10/13/22  0435 10/14/22  0356      < >  --    < > 138   < > 136   K 3.8   < >  --    < > 3.6   < > 2.9*   CO2 25   < >  --    < > 19*   < > 19*   BUN 19.0   < >  --    < > 15.2   < > 6.6*   CREATININE 0.94   < >  --    < > 0.88   < > 0.84   CALCIUM 9.6   < >  --    < > 8.9   < > 8.4   PH  --   --  7.36  --   --   --   --    MG 2.30  --   --   --   --   --   --    ALBUMIN 3.6   < >  --    < > 2.9*  --   --    ALKPHOS 95   < >  --    < > 72  --   --    ALT 14   < >  --    < > 12  --   --    AST 25   < >  --    < > 35*  --   --    BILITOT 0.4   < >  --    < > 0.6  --   --     < > = values in this interval not displayed.          Microbiology Results (last 7 days)       Procedure Component Value Units Date/Time    Blood Culture [[id number]]  (Normal) Collected: 10/10/22 0730    Order Status: Completed Specimen: Blood Updated: 10/14/22 0300     CULTURE, BLOOD ([facility]) No Growth At 72 Hours    Blood Culture [[id number]]  (Normal) Collected: 10/10/22 0730    Order Status: Completed Specimen: Blood Updated: 10/14/22 0300     CULTURE, BLOOD ([facility]) No Growth At 72 Hours             Scheduled Med:   atorvastatin  40 mg Oral Daily    ciprofloxacin  400 mg Intravenous Q12H    cloNIDine 0.1 mg/24 hr td  ptwk  1 patch Transdermal Q7 Days    clopidogreL  75 mg Oral Daily    diltiaZEM  120 mg Oral Daily    enoxaparin  1 mg/kg Subcutaneous Q12H    famotidine (PF)  20 mg Intravenous Daily    hydrALAZINE  75 mg Oral BID    isosorbide mononitrate  120 mg Oral Daily    levetiracetam (KEPPRA) IVPB 100ml  250 mg Intravenous Q12H    lisinopriL  20 mg Oral Daily    metoprolol succinate  50 mg Oral Daily    metronidazole  500 mg Intravenous Q8H    mirtazapine  15 mg Oral Nightly    montelukast  10 mg Oral Daily    nicotine  1 patch Transdermal Daily    potassium bicarbonate  50 mEq Oral Once    potassium chloride  40 mEq Intravenous Once          Assessment/Plan:    Acute encephalopathy possibly secondary to left PCA CVA  Evolving ischemic changes in the left PCA distribution  Aspiration PNA   Sepsis secondary to above -improved  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  Poorly-controlled HTN  Vascular dementia with behavioral disturbance  Mild bilateral carotid artery stenosis   History of PAD status post right fem-pop bypass, above knee amputation, left iliac stenting   History of CAD   Seizure disorder  HLD  COPD with no acute exacerbation   Osteoarthritis        MRI of the brain is pending this morning   EEG done yesterday showed diffuse slowing consistent with moderate cerebral dysfunction which is a nonspecific sign   We had reconsulted Neurology yesterday patient was NPO so anticoagulation was switched to Lovenox full dose subcu b.I.d. as per Neurology   Patient is still NPO this morning we will have speech come by work with her and if she does not pass the swallowing then we might have to put NG tube down to give her medications  Patient was started on clonidine patch yesterday since blood pressure was high blood pressure is better controlled   We were not able to get ABGs yesterday as initially patient's son refused S she as he did not want his mother to be poked that many  times  Reconsulted speech PT and OT   On D5 half-normal saline   Continue Cipro Flagyl for aspiration pneumonitis as she is allergic to penicillin.  Today is day 5 of antibiotics so we will discontinue after this afternoon's dose  White cell count is normal patient is afebrile blood cultures x2 have been negative  Previous echocardiogram with negative bubble study   Ultrasound carotid was limited study secondary to patient not being cooperative so we have repeated it please follow-up on results      Continue Other home meds-statin, Remeron, montelukast, nicotine patch  Continue IV Keppra      DVT prophylaxis- scd   Again discussed the above findings with patient's son who had multiple questions did discuss with him that we wanted ABGs and he did not want his mom to be poked multiple times , I will repeat ABGs again.will see how patient does towards the mid afternoon as she is moreay, awake at that time       Update   I was called by the nursing staff that MRI results are back reviewed MRI that showed  Acute infarct in the left PCA territory and right MCA territory with punctate focus of acute ischemia in the left frontal lobe.  Left occipital lobe petechial hemorrhage    Called neurology nurse practitioner and discussed with them and they want to hold off on anticoagulation for now.  I will discontinue Lovenox.  Will await further neurology's recommendations  Plavix is already on hold since patient is NPO  Also discussed with them no need of reversal at this point      Update  Again went and evaluated the patient much more alert and awake she knows her name.  When asked where she is she said I will see following commands  Went in there to update the family about the MRI results but there was no family in their  Will see how patient does with speech therapy hopefully she can pass the swallowing test and we can start her on a diet  No need for ABGs today since patient is much more alert and awake now      Summer  MD Harmeet   10/14/2022

## 2022-10-14 NOTE — PT/OT/SLP PROGRESS
"Occupational Therapy      Patient Name:  Daina Kinsey   MRN:  [id number]    Patient held until new neuro recommendations due to change in patient status. At 1233, spoke with SLP who discussed with neuro team about clearance for OT/PT evaluation with new BP parameters (SBP <140). Neuro team recommended OT/SLP attempt eval at this time due to patient awake and alert during neuro rounds. OT/SLP attempted eval with nurse in room at 1300. Pt awake and responsive to questions upon entry into room. Pt's son requesting hold on treatment until patient is more awake and alert OT/SLP. Following education on benefits of co-evaluation, son stated, "So let me get this straight. This is all about your convenience?" Son stating "she is not able to speak for herself." OT/SLP exited room to de-escalate the situation. Notified Neuro NP. Pt not seen today secondary to events detailed above. Will follow-up as appropriate.    10/14/2022  "

## 2022-10-14 NOTE — CONSULTS
[facility]                       [address] [id number]    PATIENT NAME:       KEILY AVERY  YOB: 1940  CSN:                [id number]   MRN:                [id number]  ADMIT DATE:         10/07/2022 14:20:00  PHYSICIAN:          Owen Reagan MD                            CONSULTATION    DATE OF CONSULT:  10/13/2022 00:00:00    I am co-signing the note for Ms. Barone, the nurse practitioner.  The patient's   YOB: 1940, in room 919.  The patient is 82 years old who   apparently was admitted last week to the hospital.  A stroke workup done at that   time came back negative. They read the CAT scan yesterday and found that the   patient has evolving stroke in the left PCA territory.  MRI of the brain has   been ordered.  Came to the room, the patient is not very responsive.  She wakes   up barely to noxious stimuli and goes back to sleep.  EEG was done on the   patient, it showed the patient has diffuse slowing.  No evidence of any   epilepsy.  The patient did apparently have also a CT angiogram in the beginning   of the admission, came back negative too.  The patient is supposed to be on   Eliquis and Plavix, assumedly because the patient had multifocal stroke in the   past, but for some reason, the patient was not given the Eliquis and Plavix for   the last two days. I am not sure if the patient was not able to swallow or   something like that, so would like to resume the Eliquis and Plavix if the   patient is able to swallow and to rule out any cardiac arrhythmia.  If the   patient has any of it, consideration also to consult with Cardiology for further   evaluation to rule out any arrhythmia and may be to consider to do a loop   recording.  DVT prophylaxis.  Aspiration precaution.  Awaiting the report of the   MRI.        ______________________________  Owen Reagan  MD PALAFOX/DEJAN  DD:  10/13/2022  Time:  09:04PM  DT:  10/14/2022  Time:  12:26AM  Job #:  [id number]      CONSULTATION

## 2022-10-14 NOTE — PT/OT/SLP PROGRESS
"SLP attempting to see pt at 0910 for evaluation; however, neuro NP in room meeting with son at bedside.  POC discussed with neuro NP, Odalys, at 0940 who states that pt's son in agreement to hold SLP eval attempts until pt more awake and alert.  At 1230, SLP notified by nursing that pt was awake and speaking.  SLP discussed with neuro NP who had just rounded with Dr. Pete that pt is awake and appropriate for SLP, PT, and OT evaluations.  SLP and OT arrived to unit and were notified by charge nurse that son is requesting to hold SLP evaluation as the pt is now "sleepy from medicine."  SLP and OT entered room with nursing present to attempt evaluation and to discuss benefits of evaluation, pt awake upon entry to room.  Son became upset stating, "I told the charge nurse to tell you to wait because she isn't awake right now."  OT discussed benefits of sitting edge of bed to give the patient the chance to awaken enough for PO intake.  Son sated, "So let me get this straight.  This is all about your convenience?"  SLP and OT explained to son that therapy evaluations were held this AM as his mother was not awake to participate however the nurse and neuro NP both alerted therapy staff that pt was more awake.  SLP and OT asked the son if he would rather evaluations be completed or to reattempt tomorrow, son responded, "So let me get this straight.  This is about your convenience instead of hers (the patient)?"  Again, SLP and OT re-explained the above information.  SLP and OT exited room to de-escalate son's frustration.  Neuro NP notified.   "

## 2022-10-14 NOTE — CONSULTS
[facility]                       [address] [id number]    PATIENT NAME:       KEILY AVERY  YOB: 1940  CSN:                [id number]   MRN:                [id number]  ADMIT DATE:         10/07/2022 14:20:00  PHYSICIAN:          Owen Reagan MD                            CONSULTATION    DATE OF CONSULT:      REASON FOR STUDY:  EEG was done for seizure workup.    DESCRIPTION:  This electroencephalogram was done using 10/20 systems using 21   channels.  The background activity is consistent of about 6 Hz.  No clear   evidence of any epileptiform discharges.    CONCLUSION:  Abnormal study with the presence of diffuse slowing, consistent   with moderate cerebral dysfunction which is nonspecific sign, can be found in   toxic, metabolic, anoxic brain injury.  No clear evidence of any epileptiform   discharges.  Clinical correlation suggested.        ______________________________  MD JAVY Sharif/DEJAN  DD:  10/13/2022  Time:  08:30PM  DT:  10/13/2022  Time:  08:44PM  Job #:  [id number]      CONSULTATION

## 2022-10-14 NOTE — PROGRESS NOTES
[facility] - 9th Floor Med Surg  Neurology  Progress Note    Patient Name: Daina Kinsey  MRN: [id number]  Admission Date: 10/7/2022  Hospital Length of Stay: 7 days  Code Status: Full Code   Attending Provider: Lluvia Alberto MD  Primary Care Physician: Aric Machado MD   Principal Problem:Stroke    HPI:   Daina Kinsey is a 82 y.o. female who presented to [facility] on 10/7/2022 with reports of aphasia.  LKN 10/7 at 7:30am.  HTN upon ED arrival.  CTh negative.  Transferred to [facility] for stroke workup.    Past medical history of vascular dementia, stroke (left sided weakness, left facial droop, confusion), on Eliquis and Plavix 2/2 multiple territory strokes, arthritis, bilateral cataracts, COPD, HTN, PAD s/p right AKA, CAD s/p stents, seizure.        Overview/Hospital Course:  (10/8/22) Back to baseline.  MRI brain negative for acute infarct.  Neurology s/o.  (10/9/22) Patient agitated; given Haldol 5mg x1 dose and agitation worsened. .... later that same day, patient had episode of hypotension which responded to fluid bolus.  ... mentation change and hypotension thought to be medication induced.  (10/10/22) Per Hospitalist note, patient lethargic.  EEG: mild background slowing, otherwise unremarkable EEG.  (10/11/22) Hospitalist notes encephalopathy improved, back to baseline.  Repeat CTh unremarkable.  (10/12/22) Hospitalist notes patient was awake all night, and therefore sleeping during the day.  Also noted patient refused medications and was more confused when awake.  (10/13/22) Patient sleepy, only moaned in response to son telling her good morning.  Repeat CTh showed evolving ischemic changes in the left PCA distribution.        Subjective:     Interval History: Lying in bed.  No acute distress noted.  Keeps eyes closed, does not participate in exam.  Son at bedside ... he reports frustration because he feels staff is not communicating with one another.      Current Facility-Administered  Medications   Medication Dose Route Frequency Provider Last Rate Last Admin    acetaminophen tablet 1,000 mg  1,000 mg Oral Q6H PRN Samaria Bermeo MD   1,000 mg at 10/08/22 2330    acetaminophen tablet 650 mg  650 mg Oral Q4H PRN Samaria Bermeo MD   650 mg at 10/12/22 1532    aluminum-magnesium hydroxide-simethicone 200-200-20 mg/5 mL suspension 30 mL  30 mL Oral QID PRN Samaria Bermeo MD        atorvastatin tablet 40 mg  40 mg Oral Daily Lluvia Alberto MD   40 mg at 10/11/22 1000    ciprofloxacin (CIPRO)400mg/200ml D5W IVPB 400 mg  400 mg Intravenous Q12H Lluvia Alberto MD   Stopped at 10/14/22 1049    cloNIDine 0.1 mg/24 hr td ptwk 1 patch  1 patch Transdermal Q7 Days Summer Ga MD   1 patch at 10/13/22 2036    clopidogreL tablet 75 mg  75 mg Oral Daily Lluvia Alberto MD   75 mg at 10/11/22 1556    dextrose 10% bolus 125 mL  12.5 g Intravenous PRN Cecilia Street, RHODA        dextrose 10% bolus 250 mL  25 g Intravenous PRN Cecilia [address], RHODA        diltiaZEM 24 hr capsule 120 mg  120 mg Oral Daily Lluvia Alberto MD   120 mg at 10/11/22 1555    famotidine (PF) injection 20 mg  20 mg Intravenous Daily Samaria Bermeo MD   20 mg at 10/14/22 1017    glucagon (human recombinant) injection 1 mg  1 mg Intramuscular PRN RHODA Smith        hydrALAZINE injection 10 mg  10 mg Intravenous Q4H PRN Samaria Bermeo MD   10 mg at 10/14/22 1236    hydrALAZINE tablet 75 mg  75 mg Oral BID Lluvia Alberto MD   75 mg at 10/12/22 2002    insulin aspart U-100 injection 0-5 Units  0-5 Units Subcutaneous Q6H PRN RHODA Smith        isosorbide mononitrate 24 hr tablet 120 mg  120 mg Oral Daily Lluvia Alberto MD   120 mg at 10/11/22 1555    labetaloL injection 10 mg  10 mg Intravenous Q4H PRN Samaria Bermeo MD   10 mg at 10/13/22 2036    labetaloL injection 5 mg  5 mg Intravenous Q2H PRN Lluvia Alberto MD   5 mg at 10/09/22 1859    levETIRAcetam (KEPPRA) 250 mg in dextrose 5 % 100 mL IVPB  250 mg Intravenous Q12H Lluvia Alberto MD    Stopped at 10/14/22 1019    lisinopriL tablet 20 mg  20 mg Oral Daily Lluvia Alberto MD   20 mg at 10/11/22 1556    melatonin tablet 6 mg  6 mg Oral Nightly PRN Samaria Bermeo MD   6 mg at 10/08/22 2023    metoprolol succinate (TOPROL-XL) 24 hr tablet 50 mg  50 mg Oral Daily Lluvia Alberto MD   50 mg at 10/11/22 1556    metronidazole IVPB 500 mg  500 mg Intravenous Q8H Lluvia Alberto MD   Stopped at 10/14/22 0809    mirtazapine tablet 15 mg  15 mg Oral Nightly Lluvia Alberto MD   15 mg at 10/12/22 2003    montelukast tablet 10 mg  10 mg Oral Daily Lluvia Alberto MD   10 mg at 10/11/22 1556    nicotine 21 mg/24 hr 1 patch  1 patch Transdermal Daily Lluvia Alberto MD   1 patch at 10/14/22 0900    ondansetron injection 4 mg  4 mg Intravenous Q4H PRN Samaria Bermeo MD   4 mg at 10/09/22 2145    polyethylene glycol packet 17 g  17 g Oral BID PRN Samaria Bermeo MD        potassium bicarbonate disintegrating tablet 50 mEq  50 mEq Oral Once Summer Ga MD        potassium chloride 20 mEq in 100 mL IVPB (FOR CENTRAL LINE ADMINISTRATION ONLY)  20 mEq Intravenous Once Summer Ga MD        prochlorperazine injection Soln 5 mg  5 mg Intravenous Q6H PRN Samaria Bermeo MD        senna-docusate 8.6-50 mg per tablet 1 tablet  1 tablet Oral BID PRN Samaria Bermeo MD        simethicone chewable tablet 80 mg  1 tablet Oral QID PRN Samaria Bermeo MD        sodium chloride 0.9% flush 10 mL  10 mL Intravenous PRN Brennon Frias DO        sodium chloride 0.9% flush 10 mL  10 mL Intravenous PRN Summer Ga MD           Review of Systems   Unable to perform ROS: Acuity of condition     Objective:     Vital Signs (Most Recent):  Temp: 99.4 °F (37.4 °C) (10/14/22 1210)  Pulse: 92 (10/14/22 1210)  Resp: 16 (10/14/22 0431)  BP: (!) 162/74 (bp reported to nurse) (10/14/22 1210)  SpO2: 95 % (10/14/22 1210)   Vital Signs (24h Range):  Temp:  [98.1 °F (36.7 °C)-99.7 °F (37.6 °C)] 99.4 °F (37.4 °C)  Pulse:  [] 92  Resp:  [16-17]  16  SpO2:  [93 %-100 %] 95 %  BP: (136-175)/(72-82) 162/74     Weight: 47.6 kg (104 lb 15 oz)  Body mass index is 19.84 kg/m².    Physical Exam  Constitutional:       General: She is not in acute distress.  Cardiovascular:      Rate and Rhythm: Normal rate and regular rhythm.   Pulmonary:      Effort: Pulmonary effort is normal.   Musculoskeletal:      Right Lower Extremity: Right leg is amputated above knee.   Neurological:      Comments: Keeps eyes closed throughout exam  Resists right eyelid from being opened, does not resist with left eyelid  Does not answer questions ... only verbal response is moaning  RUE localizes to tactile stimuli .... attempts to move examiner away when face or left arm is touched  LUE 1/5, LLE 1/5 (chronic from previous stroke)       Significant Labs: BMP:   Recent Labs   Lab 10/13/22  0435 10/14/22  0356    136   K 2.7* 2.9*   CO2 19* 19*   BUN 9.2* 6.6*   CREATININE 0.80 0.84   CALCIUM 8.5 8.4     CBC:   Recent Labs   Lab 10/13/22  0835 10/14/22  0356   WBC 8.8 8.2   HGB 10.3* 10.2*   HCT 31.4* 31.4*    159       Significant Imaging: I have reviewed all pertinent imaging results/findings within the past 24 hours.    Assessment and Plan:     * Stroke  Stroke - evolving ischemic changes left PCA    Awaiting MRI brain results  Continue FD Lovenox ... unable to have Eliquis or Plavix due to unable to tolerate oral medications.  Will request ST to reevaluate if she becomes more awake later today.        1030:  MRI brain showed acute infarcts left PCA territory, right MCA territory, punctate ischemic focus left frontal lobe; petechial hemorrhage left occipital lobe.  -Discussed with Hospitalist .... will stop FD Lovenox and Plavix due to petechial hemorrhage  -Repeat CTh in AM  -Keep SBP less than 140    1230:  Discussed with ST .... ok for PT/OT/ST to work with patient    1300:  Rounds with Dr Pete, who discussed MRI results with patient's son  Patient awake, looking around the  room  Able to say a few words        Odalys Marcelo, FNP  Neurology  [facility] - 9th Floor Med Surg

## 2022-10-14 NOTE — SUBJECTIVE & OBJECTIVE
Subjective:     Interval History: Lying in bed.  No acute distress noted.  Keeps eyes closed, does not participate in exam.  Son at bedside ... he reports frustration because he feels staff is not communicating with one another.      Current Facility-Administered Medications   Medication Dose Route Frequency Provider Last Rate Last Admin    acetaminophen tablet 1,000 mg  1,000 mg Oral Q6H PRN Samaria Bermeo MD   1,000 mg at 10/08/22 2330    acetaminophen tablet 650 mg  650 mg Oral Q4H PRN Samaria Bermeo MD   650 mg at 10/12/22 1532    aluminum-magnesium hydroxide-simethicone 200-200-20 mg/5 mL suspension 30 mL  30 mL Oral QID PRN Samaria Bermeo MD        atorvastatin tablet 40 mg  40 mg Oral Daily Lluvia Alberto MD   40 mg at 10/11/22 1000    ciprofloxacin (CIPRO)400mg/200ml D5W IVPB 400 mg  400 mg Intravenous Q12H Lluvia Alberto MD   Stopped at 10/14/22 1049    cloNIDine 0.1 mg/24 hr td ptwk 1 patch  1 patch Transdermal Q7 Days Summer Ga MD   1 patch at 10/13/22 2036    clopidogreL tablet 75 mg  75 mg Oral Daily Lluvia Alberto MD   75 mg at 10/11/22 1556    dextrose 10% bolus 125 mL  12.5 g Intravenous PRN Cecilia [address], RHODA        dextrose 10% bolus 250 mL  25 g Intravenous PRN Cecilia Street, RHODA        diltiaZEM 24 hr capsule 120 mg  120 mg Oral Daily Lluvia Alberto MD   120 mg at 10/11/22 1555    famotidine (PF) injection 20 mg  20 mg Intravenous Daily Samaria Bermeo MD   20 mg at 10/14/22 1017    glucagon (human recombinant) injection 1 mg  1 mg Intramuscular PRN Cecilia Street, RHODA        hydrALAZINE injection 10 mg  10 mg Intravenous Q4H PRN Samaria Bermeo MD   10 mg at 10/14/22 1236    hydrALAZINE tablet 75 mg  75 mg Oral BID Lluvia Alberto MD   75 mg at 10/12/22 2002    insulin aspart U-100 injection 0-5 Units  0-5 Units Subcutaneous Q6H PRN Cecilia Street, RHODA        isosorbide mononitrate 24 hr tablet 120 mg  120 mg Oral Daily Lluvia Alberto MD   120 mg at 10/11/22 1555    labetaloL injection 10 mg  10 mg  Intravenous Q4H PRN Samaria Bermeo MD   10 mg at 10/13/22 2036    labetaloL injection 5 mg  5 mg Intravenous Q2H PRN Lluvia Alberto MD   5 mg at 10/09/22 1859    levETIRAcetam (KEPPRA) 250 mg in dextrose 5 % 100 mL IVPB  250 mg Intravenous Q12H Lluvia Alberto MD   Stopped at 10/14/22 1019    lisinopriL tablet 20 mg  20 mg Oral Daily Lluvia Alberto MD   20 mg at 10/11/22 1556    melatonin tablet 6 mg  6 mg Oral Nightly PRN Samaria Bermeo MD   6 mg at 10/08/22 2023    metoprolol succinate (TOPROL-XL) 24 hr tablet 50 mg  50 mg Oral Daily Lluvia Alberto MD   50 mg at 10/11/22 1556    metronidazole IVPB 500 mg  500 mg Intravenous Q8H Lluvia Alberto MD   Stopped at 10/14/22 0809    mirtazapine tablet 15 mg  15 mg Oral Nightly Lluvia Alberto MD   15 mg at 10/12/22 2003    montelukast tablet 10 mg  10 mg Oral Daily Lluvia Alberto MD   10 mg at 10/11/22 1556    nicotine 21 mg/24 hr 1 patch  1 patch Transdermal Daily Lluvia Alberto MD   1 patch at 10/14/22 0900    ondansetron injection 4 mg  4 mg Intravenous Q4H PRN Samaria Bermeo MD   4 mg at 10/09/22 2145    polyethylene glycol packet 17 g  17 g Oral BID PRN Samaria Bermeo MD        potassium bicarbonate disintegrating tablet 50 mEq  50 mEq Oral Once Summer Ga MD        potassium chloride 20 mEq in 100 mL IVPB (FOR CENTRAL LINE ADMINISTRATION ONLY)  20 mEq Intravenous Once Summer Ga MD        prochlorperazine injection Soln 5 mg  5 mg Intravenous Q6H PRN Samaria Bermeo MD        senna-docusate 8.6-50 mg per tablet 1 tablet  1 tablet Oral BID PRN Samaria Bermeo MD        simethicone chewable tablet 80 mg  1 tablet Oral QID PRN Samaria Bermeo MD        sodium chloride 0.9% flush 10 mL  10 mL Intravenous PRN Brennon Frias DO        sodium chloride 0.9% flush 10 mL  10 mL Intravenous PRN Summer Ga MD           Review of Systems   Unable to perform ROS: Acuity of condition     Objective:     Vital Signs (Most Recent):  Temp: 99.4 °F (37.4 °C) (10/14/22  1210)  Pulse: 92 (10/14/22 1210)  Resp: 16 (10/14/22 0431)  BP: (!) 162/74 (bp reported to nurse) (10/14/22 1210)  SpO2: 95 % (10/14/22 1210)   Vital Signs (24h Range):  Temp:  [98.1 °F (36.7 °C)-99.7 °F (37.6 °C)] 99.4 °F (37.4 °C)  Pulse:  [] 92  Resp:  [16-17] 16  SpO2:  [93 %-100 %] 95 %  BP: (136-175)/(72-82) 162/74     Weight: 47.6 kg (104 lb 15 oz)  Body mass index is 19.84 kg/m².    Physical Exam  Constitutional:       General: She is not in acute distress.  Cardiovascular:      Rate and Rhythm: Normal rate and regular rhythm.   Pulmonary:      Effort: Pulmonary effort is normal.   Musculoskeletal:      Right Lower Extremity: Right leg is amputated above knee.   Neurological:      Comments: Keeps eyes closed throughout exam  Resists right eyelid from being opened, does not resist with left eyelid  Does not answer questions ... only verbal response is moaning  RUE localizes to tactile stimuli .... attempts to move examiner away when face or left arm is touched  LUE 1/5, LLE 1/5 (chronic from previous stroke)       Significant Labs: BMP:   Recent Labs   Lab 10/13/22  0435 10/14/22  0356    136   K 2.7* 2.9*   CO2 19* 19*   BUN 9.2* 6.6*   CREATININE 0.80 0.84   CALCIUM 8.5 8.4     CBC:   Recent Labs   Lab 10/13/22  0835 10/14/22  0356   WBC 8.8 8.2   HGB 10.3* 10.2*   HCT 31.4* 31.4*    159       Significant Imaging: I have reviewed all pertinent imaging results/findings within the past 24 hours.

## 2022-10-14 NOTE — NURSING
Pt went to MRI, son @ bedside, had to explain to him thoroughly the reason why pt had to go back to MRI. Also explained how to use his words correctly without profanity..and not to try and intimidate staff. The son understands security will be called for any yelling towards staff

## 2022-10-14 NOTE — NURSING
Notified Hospitalist @[id number], left message with Shraddha about Labs:  H&H 10.2/31.4  Potassium 2.9  BUN 6.6

## 2022-10-15 NOTE — NURSING
Informed the patients family members multiple times throughout the day on pts care. Pt son was very irate multiple times throughout the day with myself and multiple staff members, reassured him, educated him.

## 2022-10-15 NOTE — PROGRESS NOTES
[facility]  Hospital Medicine Progress Note           Chief Complaint: Inpatient Follow-up     HPI:   82-year-old female with significant history of multiple CVAs with residual left-sided weakness, left facial droop, dysarthria and baseline confusion.  Latest CVA in February, 2022.  Bubble study negative.  Ultrasound carotid with less than 50% stenosis bilaterally.  No AFib.  Patient was then discharged on Eliquis, Plavix, statin.  Aspirin was discontinued.  Patient presented back to the ED with new onset aphasia-expressive.  Patient was afebrile, hypertensive in the ED.  EKG-NSR.  CT head was negative.  Did confirm the old infarcts.  Patient was transferred to our hospital for Neurology evaluation.  Stroke protocol initiated.  Workup ordered.  Neurology consulted.  MRI negative for CVA.  Symptoms most likely secondary to dehydration, on IV hydration.  Family requesting skilled nursing facility placement.  Home meds resumed as appropriate.  Neurology recommended to continue Eliquis, Plavix.  Patient was agitated on 10/09, gave her a dose of Haldol following which she had worsening mentation with lethargic, drowsiness and hypotension, hypotension improved with IV fluids.  Holding all mood altering drugs.  Placed on IV fluids and kept NPO.  CT head, ABG non impressive.  Chest x-ray consistent with aspiration pneumonia, initiated appropriate antibiotics-Cipro and Flagyl.  Patient has allergy to penicillin.  Patient still lethargic and drowsy on 10/10.  Keeping NPO, on IV fluids.  Mentation much better on 10/11.  Speech to re-evaluate to clear for p.o. intake.  IV fluids continued.        Interval Hx:    Patient seen and examined with patient's son bedside   Patient asking to have watermelon.  Will await speech eval to advance her diet if she passes  Replete potassium   Continue blood pressure control  PT eval   Ct head is stable        Objective/physical exam:  General: In no acute  distress, afebrile sleepy did open her eyes   Chest: Clear to auscultation bilaterally  Heart: S1, S2, no appreciable murmur  Abdomen: Soft, nontender, BS +  MSK: Warm, no lower extremity edema, no clubbing or cyanosis  Neurologic:  Sleeping at the time of my rounds  did open her eyes    Assessment/Plan:   hypokalemia   Acute encephalopathy possibly secondary to left PCA CVA  Evolving ischemic changes in the left PCA distribution  Aspiration PNA   Sepsis secondary to above -improved  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  Poorly-controlled HTN  Vascular dementia with behavioral disturbance  Mild bilateral carotid artery stenosis   History of PAD status post right fem-pop bypass, above knee amputation, left iliac stenting   History of CAD   Seizure disorder  HLD  COPD with no acute exacerbation   Osteoarthritis       plan  Replete potassium   Continue blood pressure control  PT eval  Cthead is stable    EEG done yesterday showed diffuse slowing consistent with moderate cerebral dysfunction which is a nonspecific sign   We had reconsulted Neurology yesterday patient was NPO so anticoagulation was switched to Lovenox full dose subcu b.I.d. as per Neurology   Patient is still NPO this morning we will have speech come by work with her and if she does not pass the swallowing then we might have to put NG tube down to give her medications  Patient was started on clonidine patch yesterday since blood pressure was high blood pressure is better controlled   We were not able to get ABGs yesterday as initially patient's son refused S she as he did not want his mother to be poked that many times  Reconsulted speech PT and OT   On D5 half-normal saline   s/p Cipro, Flagyl for aspiration pneumonitis as she is allergic to penicillin.    White cell count is normal patient is afebrile blood cultures x2 have been negative  Previous echocardiogram with negative bubble study   Ultrasound carotid was  limited study secondary to patient not being cooperative so we have repeated it please follow-up on results        Continue Other home meds-statin, Remeron, montelukast, nicotine patch  Continue IV Keppra       DVT prophylaxis- scd         VITAL SIGNS: 24 HRS MIN & MAX LAST   Temp  Min: 98 °F (36.7 °C)  Max: 99.4 °F (37.4 °C) 99 °F (37.2 °C)   BP  Min: 94/59  Max: 169/69 121/72   Pulse  Min: 88  Max: 111  104   Resp  Min: 16  Max: 18 18   SpO2  Min: 95 %  Max: 99 % 98 %       Recent Labs   Lab 10/12/22  0543 10/13/22  0835 10/14/22  0356   WBC 11.4 8.8 8.2   RBC 3.32* 3.37* 3.36*   HGB 10.1* 10.3* 10.2*   HCT 31.6* 31.4* 31.4*   MCV 95.2* 93.2 93.5   MCH 30.4 30.6 30.4   MCHC 32.0* 32.8* 32.5*   RDW 14.6 14.5 14.6    161 159   MPV 11.3* 10.6* 10.3       Recent Labs   Lab 10/09/22  1254 10/09/22  1456 10/10/22  0601 10/11/22  0601 10/12/22  0441 10/13/22  0435 10/14/22  0356 10/15/22  0545   NA  --    < > 144 141 138 138 136 140   K  --    < > 4.1 3.7 3.6 2.7* 2.9* 3.2*   CO2  --    < > 19* 23 19* 19* 19* 20*   BUN  --    < > 20.1 19.7 15.2 9.2* 6.6* 5.4*   CREATININE  --    < > 0.93 0.90 0.88 0.80 0.84 0.77   CALCIUM  --    < > 9.5 8.7 8.9 8.5 8.4 8.2*   PH 7.36  --   --   --   --   --   --   --    ALBUMIN  --    < > 3.4 2.9* 2.9*  --   --   --    ALKPHOS  --    < > 96 75 72  --   --   --    ALT  --    < > 17 12 12  --   --   --    AST  --    < > 52* 37* 35*  --   --   --    BILITOT  --    < > 0.4 0.4 0.6  --   --   --     < > = values in this interval not displayed.          Microbiology Results (last 7 days)       Procedure Component Value Units Date/Time    Blood Culture [[id number]]  (Normal) Collected: 10/10/22 0730    Order Status: Completed Specimen: Blood Updated: 10/14/22 1430     CULTURE, BLOOD ([facility]) No Growth At 96 Hours    Blood Culture [[id number]]  (Normal) Collected: 10/10/22 0730    Order Status: Completed Specimen: Blood Updated: 10/14/22 1430     CULTURE, BLOOD ([facility]) No Growth At 96 Hours              See below for Radiology    Scheduled Med:   atorvastatin  40 mg Oral Daily    cloNIDine 0.1 mg/24 hr td ptwk  1 patch Transdermal Q7 Days    diltiaZEM  120 mg Oral Daily    famotidine (PF)  20 mg Intravenous Daily    hydrALAZINE  75 mg Oral BID    isosorbide mononitrate  120 mg Oral Daily    levetiracetam (KEPPRA) IVPB 100ml  250 mg Intravenous Q12H    lisinopriL  20 mg Oral Daily    metoprolol succinate  50 mg Oral Daily    mirtazapine  15 mg Oral Nightly    montelukast  10 mg Oral Daily    nicotine  1 patch Transdermal Daily        Continuous Infusions:       PRN Meds:  acetaminophen, acetaminophen, aluminum-magnesium hydroxide-simethicone, dextrose 10%, dextrose 10%, glucagon (human recombinant), hydrALAZINE, insulin aspart U-100, labetalol, labetaloL, melatonin, ondansetron, polyethylene glycol, prochlorperazine, senna-docusate 8.6-50 mg, simethicone, sodium chloride 0.9%, sodium chloride 0.9%       VTE prophylaxis:     Patient condition:  Stable/Fair/Guarded/ Serious/ Critical    Anticipated discharge and Disposition:         All diagnosis and differential diagnosis have been reviewed; assessment and plan has been documented; I have personally reviewed the labs and test results that are presently available; I have reviewed the patients medication list; I have reviewed the consulting providers response and recommendations. I have reviewed or attempted to review medical records based upon their availability    All of the patient's questions have been  addressed and answered. Patient's is agreeable to the above stated plan. I will continue to monitor closely and make adjustments to medical management as needed.  _____________________________________________________________________    Nutrition Status:    Radiology:  CT Head Without Contrast  EXAMINATION  CT HEAD WITHOUT CONTRAST    CLINICAL HISTORY  Stroke, follow up;re evaluate left occipital petechial hemorrhage seen on MRI;    TECHNIQUE  Axial  non-contrast CT images of the head were acquired and multiplanar reconstructions accomplished by a CT technologist at a separate workstation, pushed to PACS for physician review.    COMPARISON  13 October 2022    FINDINGS  Images were reviewed in subdural, brain, soft tissue, and bone windows.    Exam quality: Motion/streak artifact limits assessment of the posterior fossa.    There are similar areas of widespread hypo density at the bilateral supratentorial parenchyma consistent with multifocal large territory ischemic infarct.  No new hyperdensity is appreciated to suggest hemorrhagic conversion.  No new focal intracranial abnormality, localized mass effect, or midline shift is identified in comparison.  There is no hydrocephalus.    Visualized osseous structures and extracranial soft tissues are unchanged.    IMPRESSION  1. Redemonstrated multiple bilateral supratentorial ischemic infarcts.  2. No new or worsening abnormality.    RADIATION DOSE  Automated tube current modulation, weight-based exposure dosing, and/or iterative reconstruction technique utilized to reach lowest reasonably achievable exposure rate.    DLP: 2014 mGy*cm    Electronically signed by: Connor Damico  Date:    10/15/2022  Time:    08:13      Alfie Santiago MD   10/15/2022

## 2022-10-15 NOTE — PROGRESS NOTES
Seen in w/e rounds.  Pt's son and nurse in the room  No new neuro problems  Repeated CT head shows stable finding - no worsening of bleeding.  Son concerned about BP control  Neurowise, she is stable.  Awake and follows commands.  [place] of rehab discussed.  Cont holding AC for now.  Stroke team to follow on Mon  Call with any questions

## 2022-10-15 NOTE — PLAN OF CARE
Problem: SLP  Goal: SLP Goal  Description: LTG:  Pt will tolerate least restrictive diet with no clinical signs/x of aspiration    ST. Pt will tolerate PO trials soft and bite sized with no clinical signs/sx of aspiration     Outcome: Ongoing, Progressing

## 2022-10-15 NOTE — NURSING
Pt oldest son is in town for the weekend, asking for the day shift charge nurse to allow sister who was escorted out last weekend by security to please come back. His youngest brother has been the family member causing confusion throughout the week. Oldest brother, Tucker Sherman, left his information to contact him if anything happens with his brother. The phone number will be in the patients chart

## 2022-10-15 NOTE — PT/OT/SLP EVAL
Speech Language Pathology Department  Clinical Swallow Evaluation    Patient Name:  Daina Kinsey   MRN:  [id number]  Admitting Diagnosis: Stroke    Recommendations:     General recommendations:  SLP follow up x1  Diet recommendations:  Puree Diet - IDDSI Level 4, Liquid Diet Level: Mildly thick/Nectar thick liquids - IDDSI Level 2   Swallow strategies/precautions: small bites/sips, slow rate, additional swallow per bite/sip, alternate solids/liquids, and Supervision with meals, meds crushed in puree   Precautions: Standard, aspiration    History:     Past Medical History:   Diagnosis Date    Arthritis     Atherosclerosis     Cataracts, bilateral     COPD (chronic obstructive pulmonary disease)     Gallstones     HTN (hypertension)     PAD (peripheral artery disease)     Seizure     Stomach ulcer     Stroke     Vitamin D deficiency        Past Surgical History:   Procedure Laterality Date    CREATION OF FEMORAL-TIBIAL ARTERY BYPASS      INFERIOR VENA CAVA ANGIOPLASTY / STENTING      LEG AMPUTATION THROUGH KNEE      right    REPAIR OF CAROTID ARTERY      TOE AMPUTATION      left great toe     Home Diet: Regular and thin liquids  Current Method of Nutrition: NPO    Patient complaint: to eat/drink     Subjective     Patient awake, alert, and cooperative.    Patient goals: to eat/drink      Pain/Comfort: Pain Rating 1: 0/10    Objective:     Oral Musculature Evaluation  Oral Musculature: general weakness, left weakness  Dentition: edentulous  Secretion Management: adequate  Mucosal Quality: good  Mandibular Strength and Mobility: WFL  Oral Labial Strength and Mobility: other (see comments)  Lingual Strength and Mobility: impaired strength  Volitional Cough: present  Voice Prior to PO Intake: clear    Consistency Fed By Oral Symptoms Pharyngeal Symptoms   Puree SLP Oral residue None   Mildly thick liquid by cup Self None None   Mildly thick liquid by straw SLP None None     Assessment:     Pt previously underwent MBS  during this hospital admission (10/11/22) with recommendations for pureed solids with mildly thick liquids. SLP reconsuled on 10/13/22 following mental status change and orders for NPO. Pt now presents with improved mentation and able to tolerate previously recommended diet of puree with mildly thick secondary to edentulous and L sided residual weakness.      Goals:   Multidisciplinary Problems       SLP Goals          Problem: SLP    Goal Priority Disciplines Outcome   SLP Goal     SLP Ongoing, Progressing   Description: LTG:  Pt will tolerate least restrictive diet with no clinical signs/x of aspiration    ST. Pt will tolerate PO trials soft and bite sized with no clinical signs/sx of aspiration                          Plan:     Patient to be seen:  5 x/week   Plan of Care expires:  22  Plan of Care reviewed with:  patient, son   SLP Follow-Up:  Yes      Time Tracking:     SLP Treatment Date:   10/15/22  Speech Start Time:  0800  Speech Stop Time:  0820     Speech Total Time (min):  20 min    Billable minutes:  Swallow and Oral Function Evaluation, 20 minutes      10/15/2022

## 2022-10-16 NOTE — PT/OT/SLP PROGRESS
"Occupational Therapy      Patient Name:  Daina Kinsey   MRN:  [id number]    Patient not seen today secondary to Patient unwilling to participate. RN reports pt drowsy today, not cooperating to alert to take meds.  OT attempted at 1128. Pt in L side lying position, CNA present taking vitals with pt giving some resistance, OT introduction and tactile stimulation to pt hand , pt stated "your hands are cold. I told ya'll to leave me the f* alone"  while attempting to recover self. OT and CNA pulled pt up to HOB and reapplied wedge for L sidelying position. Will follow-up as schedule allows.    10/16/2022  "

## 2022-10-16 NOTE — PROGRESS NOTES
[facility]  Hospital Medicine Progress Note           Chief Complaint: Inpatient Follow-up     HPI:   82-year-old female with significant history of multiple CVAs with residual left-sided weakness, left facial droop, dysarthria and baseline confusion.  Latest CVA in February, 2022.  Bubble study negative.  Ultrasound carotid with less than 50% stenosis bilaterally.  No AFib.  Patient was then discharged on Eliquis, Plavix, statin.  Aspirin was discontinued.  Patient presented back to the ED with new onset aphasia-expressive.  Patient was afebrile, hypertensive in the ED.  EKG-NSR.  CT head was negative.  Did confirm the old infarcts.  Patient was transferred to our hospital for Neurology evaluation.  Stroke protocol initiated.  Workup ordered.  Neurology consulted.  MRI negative for CVA.  Symptoms most likely secondary to dehydration, on IV hydration.  Family requesting skilled nursing facility placement.  Home meds resumed as appropriate.  Neurology recommended to continue Eliquis, Plavix.  Patient was agitated on 10/09, gave her a dose of Haldol following which she had worsening mentation with lethargic, drowsiness and hypotension, hypotension improved with IV fluids.  Holding all mood altering drugs.  Placed on IV fluids and kept NPO.  CT head, ABG non impressive.  Chest x-ray consistent with aspiration pneumonia, initiated appropriate antibiotics-Cipro and Flagyl.  Patient has allergy to penicillin.  Patient still lethargic and drowsy on 10/10.  Keeping NPO, on IV fluids.  Mentation much better on 10/11.  Speech to re-evaluate to clear for p.o. intake.  IV fluids continued.        Interval Hx:    Patient seen and examined , no family member at bedside today  Blood pressure elevated.  Increase hydralazine to t.i.d.  patient is sleeping but arousable  No overnight events   await PT recs           Objective/physical exam:  General: In no acute distress, afebrile sleepy did open her  eyes   Chest: Clear to auscultation bilaterally  Heart: S1, S2, no appreciable murmur  Abdomen: Soft, nontender, BS +  MSK: Warm, no lower extremity edema, no clubbing or cyanosis  Neurologic:  Sleeping at the time of my rounds  did open her eyes     Assessment/Plan:  Uncontrolled hypertension  Acute encephalopathy possibly secondary to left PCA CVA  Evolving ischemic changes in the left PCA distribution  Aspiration PNA   Sepsis secondary to above -improved  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 dosing  await PT recommendations for discharge  Cthead is stable   EEG done yesterday showed diffuse slowing consistent with moderate cerebral dysfunction which is a nonspecific sign  F/up neuro recs   On a diet    s/p Cipro, Flagyl for aspiration pneumonitis as she is allergic to penicillin.    Previous echocardiogram with negative bubble study   Ultrasound carotid was limited study secondary to patient not being cooperative so we have repeated it please follow-up on results  Continue Other home meds-statin, Remeron, montelukast, nicotine patch  Continue IV Keppra        DVT prophylaxis- scd    Disposition medically cleared for discharge await PT recs for discharge    VITAL SIGNS: 24 HRS MIN & MAX LAST   Temp  Min: 97.4 °F (36.3 °C)  Max: 99.3 °F (37.4 °C) 99.3 °F (37.4 °C)   BP  Min: 118/77  Max: 176/91 (!) (P) 168/74     Pulse  Min: 86  Max: 110  (P) 95   Resp  Min: 16  Max: 20 18   SpO2  Min: 92 %  Max: 98 % 97 %       Recent Labs   Lab 10/12/22  0543 10/13/22  0835 10/14/22  0356   WBC 11.4 8.8 8.2   RBC 3.32* 3.37* 3.36*   HGB 10.1* 10.3* 10.2*   HCT 31.6* 31.4* 31.4*   MCV 95.2*  93.2 93.5   MCH 30.4 30.6 30.4   MCHC 32.0* 32.8* 32.5*   RDW 14.6 14.5 14.6    161 159   MPV 11.3* 10.6* 10.3       Recent Labs   Lab 10/09/22  1254 10/09/22  1456 10/10/22  0601 10/11/22  0601 10/12/22  0441 10/13/22  0435 10/14/22  0356 10/15/22  0545   NA  --    < > 144 141 138 138 136 140   K  --    < > 4.1 3.7 3.6 2.7* 2.9* 3.2*   CO2  --    < > 19* 23 19* 19* 19* 20*   BUN  --    < > 20.1 19.7 15.2 9.2* 6.6* 5.4*   CREATININE  --    < > 0.93 0.90 0.88 0.80 0.84 0.77   CALCIUM  --    < > 9.5 8.7 8.9 8.5 8.4 8.2*   PH 7.36  --   --   --   --   --   --   --    ALBUMIN  --    < > 3.4 2.9* 2.9*  --   --   --    ALKPHOS  --    < > 96 75 72  --   --   --    ALT  --    < > 17 12 12  --   --   --    AST  --    < > 52* 37* 35*  --   --   --    BILITOT  --    < > 0.4 0.4 0.6  --   --   --     < > = values in this interval not displayed.          Microbiology Results (last 7 days)       Procedure Component Value Units Date/Time    Blood Culture [[id number]]  (Normal) Collected: 10/10/22 0730    Order Status: Completed Specimen: Blood Updated: 10/15/22 1334     CULTURE, BLOOD ([facility]) Final Report: At 5 days. No growth    Blood Culture [[id number]]  (Normal) Collected: 10/10/22 0730    Order Status: Completed Specimen: Blood Updated: 10/15/22 1334     CULTURE, BLOOD ([facility]) Final Report: At 5 days. No growth             See below for Radiology    Scheduled Med:   atorvastatin  40 mg Oral Daily    cloNIDine 0.1 mg/24 hr td ptwk  1 patch Transdermal Q7 Days    diltiaZEM  120 mg Oral Daily    famotidine (PF)  20 mg Intravenous Daily    hydrALAZINE  75 mg Oral Q8H    isosorbide mononitrate  120 mg Oral Daily    levetiracetam (KEPPRA) IVPB 100ml  250 mg Intravenous Q12H    lisinopriL  20 mg Oral Daily    metoprolol succinate  50 mg Oral Daily    mirtazapine  15 mg Oral Nightly    montelukast  10 mg Oral Daily    nicotine  1 patch Transdermal Daily        Continuous Infusions:       PRN Meds:  acetaminophen, acetaminophen,  aluminum-magnesium hydroxide-simethicone, dextrose 10%, dextrose 10%, glucagon (human recombinant), hydrALAZINE, insulin aspart U-100, labetalol, labetaloL, melatonin, ondansetron, polyethylene glycol, prochlorperazine, senna-docusate 8.6-50 mg, simethicone, sodium chloride 0.9%, sodium chloride 0.9%     VTE prophylaxis:     Patient condition:  Stable/Fair/Guarded/ Serious/ Critical    Anticipated discharge and Disposition:         All diagnosis and differential diagnosis have been reviewed; assessment and plan has been documented; I have personally reviewed the labs and test results that are presently available; I have reviewed the patients medication list; I have reviewed the consulting providers response and recommendations. I have reviewed or attempted to review medical records based upon their availability    All of the patient's questions have been  addressed and answered. Patient's is agreeable to the above stated plan. I will continue to monitor closely and make adjustments to medical management as needed.  _____________________________________________________________________    Nutrition Status:    Radiology:  CV Ultrasound Bilateral Doppler Carotid  Difficult study due to patient being uncooperative/combative. Attempted   twice to get better images. May be best to compare with prior study done   on 10/7/22.    The right internal carotid artery demonstrated elevated velocities that   indicate severe 70-99% stenosis.  The left internal carotid artery demonstrated elevated velocities that   indicate 50-69% stenosis.    The left vertebral artery is patent with antegrade flow.    Unable to visualize the right vertebral artery.  CT Head Without Contrast  EXAMINATION  CT HEAD WITHOUT CONTRAST    CLINICAL HISTORY  Stroke, follow up;re evaluate left occipital petechial hemorrhage seen on MRI;    TECHNIQUE  Axial non-contrast CT images of the head were acquired and multiplanar reconstructions accomplished by a CT  technologist at a separate workstation, pushed to PACS for physician review.    COMPARISON  13 October 2022    FINDINGS  Images were reviewed in subdural, brain, soft tissue, and bone windows.    Exam quality: Motion/streak artifact limits assessment of the posterior fossa.    There are similar areas of widespread hypo density at the bilateral supratentorial parenchyma consistent with multifocal large territory ischemic infarct.  No new hyperdensity is appreciated to suggest hemorrhagic conversion.  No new focal intracranial abnormality, localized mass effect, or midline shift is identified in comparison.  There is no hydrocephalus.    Visualized osseous structures and extracranial soft tissues are unchanged.    IMPRESSION  1. Redemonstrated multiple bilateral supratentorial ischemic infarcts.  2. No new or worsening abnormality.    RADIATION DOSE  Automated tube current modulation, weight-based exposure dosing, and/or iterative reconstruction technique utilized to reach lowest reasonably achievable exposure rate.    DLP: 2014 mGy*cm    Electronically signed by: Connor Damico  Date:    10/15/2022  Time:    08:13      Alfie Santiago MD   10/16/2022                  Cardiopulmonary Arrest

## 2022-10-16 NOTE — PT/OT/SLP PROGRESS
SLP followed up with nursing regarding diet tolerance. Nursing reports no difficulties and/or signs/sx of aspiration during PO intake. No skilled SLP intervention is warranted at this time. Please reconsult as needed.

## 2022-10-17 NOTE — NURSING
Pt son son was verbally and physically agressive towards nurse during pt care, nurse explain pt care to son but the he kept accusing staff in the hospital for various reasons educated on the care of pt but he was still hesitant, charge nurse came in to further explain care to son, son is calm at this time.

## 2022-10-17 NOTE — PROGRESS NOTES
Inpatient Nutrition Assessment    Admit Date: 10/7/2022   Total duration of encounter: 10 days     Nutrition Recommendation/Prescription     - Continue oral diet as per SLP  - If pt continues to refuse oral intake, recommend Peg placement and begin tube feeds. Recommend Isosource HN @ goal rate 55mL/hr to meet 100% est nutritional needs.     Communication of Recommendations: reviewed with nurse    Nutrition Assessment     Malnutrition Assessment/Nutrition-Focused Physical Exam    Malnutrition in the context of acute illness or injury  Degree of Malnutrition: non-severe (moderate) malnutrition  Energy Intake: < 75% of estimated energy requirement for > 7 days  Interpretation of Weight Loss: does not meet criteria  Body Fat:mild depletion  Area of Body Fat Loss: orbital region  and upper arm region - triceps / biceps  Muscle Mass Loss: moderate depletion  Area of Muscle Mass Loss: clavicle bone region - pectoralis major, deltoid, trapezius muscles, dorsal hand - interosseous musle, and patellar region - quadricep muscle  Fluid Accumulation: does not meet criteria  Edema: not applicable   Reduced  Strength: unable to obtain  A minimum of two characteristics is recommended for diagnosis of either severe or non-severe malnutrition.    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, dysarthria, and left facial droop  Bilateral carotid atherosclerotic disease  Probable vascular dementia  Hypertension      Relevant Medical History:   Multiple CVAs-  left-sided weakness, left facial droop, dysarthria, and confusion  Bilateral LONG  PAD - right fem-tib bypass, right AKA, left iliac stent  CAD-obstructive per [facility] 2013  Probable vascular dementia  Seizure  HTN  HLD  COPD  Osteoarthritis    Nutrition-Related Medications: Cipro, flagyl. Mag sulfate  Calorie Containing IV Medications: no significant kcals from  "medications at this time    Nutrition-Related Labs:  10/8: Cl 110, GFR >60  10/13: H/H-10.3/31.4, K-2.7, Bun-9.2  10/16: K-3.1, Bun-4.5, Gluc-79    Diet/PN Order: Diet Pureed Mildly Thick Liquids; Supervision with Meals  Oral Supplement Order: Boost VHC  Tube Feeding Order: none at this time  Appetite/Oral Intake: poor/0-25% of meals  Factors Affecting Nutritional Intake: chewing difficulty, difficulty/impaired swallowing, and edentulous  Food/Moravian/Cultural Preferences: none reported    Wound(s): none noted    Comments    10/8: Seen for unsure wt loss. Pt in bed with daughter at bedside. Noted SLP evaluated this am, rec'd minced and moist diet with mildly thick liquids. Pt has not attempted PO intake yet, will monitor tolerance. Per daughter, pt eats fruit, eggs, grits, red beans and rice at home - usually with good intake except for last week or so. Offered ONS VHC- pt receptive. No significant wt loss noted.    10/13: Pt only mumbling during visit. Son at bedside. Pt now NPO. SLP re-consulted. Pt has been refusing medications. Some confusion noted as well. Pt may benefit from Peg placement if unable to resume oral diet due to mental status.     10/17: Pt refusing oral intake. Nsg stated had a hard time getting pt to take oral medications. Tongue is also difficult to move around for intake. If pt continues to refuse, may benefit from Peg placement and begin Tube feeds for primary source of nutrition.     Anthropometrics    Height: 5' 0.98" (154.9 cm) Height Method: Stated  Last Weight: 47.6 kg (104 lb 15 oz) (10/08/22 1225) Weight Method: Bed Scale  BMI (Calculated): 19.8-Not appropriate due to Rt BKA  BMI Classification: normal (BMI 18.5-24.9)     Ideal Body Weight (IBW), Female: 104.9 lb     % Ideal Body Weight, Female (lb): 100.04 %        Usual Body Weight (UBW), k kg (98-105lb)  % Usual Body Weight: 103.69  % Weight Change From Usual Weight: 3.48 %  Usual Weight Provided By: family/caregiver    Wt " Readings from Last 5 Encounters:   10/08/22 47.6 kg (104 lb 15 oz)   09/29/22 47.6 kg (105 lb)   06/17/22 45.4 kg (100 lb)   05/17/22 46.3 kg (102 lb)   11/26/19 46.6 kg (102 lb 11.8 oz)     Weight Change(s) Since Admission:   Admit Weight: 47.6 kg (105 lb) (10/07/22 1421)  10/13: 47.6kg  10/17: 47.6kg    Estimated Needs    Weight Used For Calorie Calculations: 47.6 kg (104 lb 15 oz)  Energy Calorie Requirements (kcal): 1316kcals (28kcal/kg)  Energy Need Method: Kcal/kg  Weight Used For Protein Calculations: 47.6 kg (104 lb 15 oz)  Protein Requirements: 57gm/kg (1.2gm/kg)  Fluid Requirements (mL): 1300mL  Temp: 98.1 °F (36.7 °C)       Enteral Nutrition    Patient not receiving enteral nutrition at this time.    Parenteral Nutrition    Patient not receiving parenteral nutrition support at this time.    Evaluation of Received Nutrient Intake    Calories: not meeting estimated needs  Protein: not meeting estimated needs    Patient Education    Not applicable.    Nutrition Diagnosis     PES: Malnutrition related to stroke as evidenced by <75% estimated energy needs, physical evidence of mild muscle/fat wasting. (continues)    Interventions/Goals     Intervention(s): modified composition of meals/snacks, modified composition of enteral nutrition, commercial beverage, and collaboration with other providers  Goal: Meet greater than 75% of nutritional needs by follow-up. (goal not met)    Monitoring & Evaluation     Dietitian will monitor food and beverage intake, enteral nutrition intake, and weight change.  Nutrition Risk/Follow-Up: high (follow-up in 1-4 days)

## 2022-10-17 NOTE — PLAN OF CARE
Problem: Occupational Therapy  Goal: Occupational Therapy Goal  Description: Goals to be met by: 10/25/2022     Patient will increase functional independence with ADLs by performing:    Feeding with Set-up Assistance.  UE Dressing with Moderate Assistance.  LE Dressing with Moderate Assistance.  Grooming while seated at sink with Minimal Assistance.  Toileting from bed level with Maximum Assistance for hygiene and clothing management.     Outcome: Ongoing, Progressing

## 2022-10-17 NOTE — PT/OT/SLP RE-EVAL
"Occupational Therapy   Re-evaluation    Name: Daina Kinsey  MRN: [id number]  Admitting Diagnosis:  Stroke  Recent Surgery: * No surgery found *      Recommendations:     Discharge Recommendations: nursing facility, skilled  Discharge Equipment Recommendations:  none  Barriers to discharge:  None    Assessment:     Daina Kinsey is a 82 y.o. female with a medical diagnosis of L PCA Stroke. Pt has a PMHx of dementia, R AKA, and CVA with residual L sided deficits. Right shoulder appears to be slightly weaker than initial  evaluation; however, it is difficulty to accurately assess due to patients difficulty following commands. Other than that, pt appears to be at a similar level of function. Performance deficits affecting function are weakness, impaired endurance, decreased upper extremity function, decreased lower extremity function, impaired balance, impaired sensation, impaired self care skills, impaired functional mobility, visual deficits, decreased safety awareness, abnormal tone, impaired cognition, decreased coordination.      Rehab Prognosis:  Fair; patient would benefit from acute skilled OT services to address these deficits and reach maximum level of function.       Plan:     Patient to be seen 3 x/week, 4 x/week to address the above listed problems via self-care/home management, therapeutic activities, therapeutic exercises, neuromuscular re-education  Plan of Care Expires: 10/31/22  Plan of Care Reviewed with: patient, son    Subjective     Chief Complaint: "I'm cold"  Patient/Family stated goals: to get stronger  Communicated with: nrsg prior to session.  Pain/Comfort:  Pain Rating 1: 0/10    Objective:     Communicated with: nrsg prior to session.  Patient found L-sidelying on wedge with: telemetry upon OT entry to room.    General Precautions: Standard, fall, SBP <140  Orthopedic Precautions:N/A   Braces: N/A  Respiratory Status: Room air  /67    Occupational Performance:    Bed Mobility:  "   Patient completed Rolling/Turning to Left with  total assistance  Patient completed Rolling/Turning to Right with total assistance  Patient completed Scooting/Bridging with total assistance  Patient completed Supine to Sit with total assistance  Patient completed Sit to Supine with total assistance    Functional Mobility/Transfers:  Not attempted. Pt not agreeable.     Activities of Daily Living:  Feeding:  total assistance .  Toileting: total assistance bed level    Cognitive/Visual Perceptual:  Cognitive/Psychosocial Skills:     -       Oriented to: Person and Place   -       Follows commands/attention: follows simple one-step commands  Visual/Perceptual:      -unable to assess due to pt's difficulty following commands     Physical Exam:  Balance:    -       mod A static sitting balance  Upper Extremity Range of Motion:     -       Right Upper Extremity: PROM/AROM intact  -       Left Upper Extremity: contractures in all joints  Upper Extremity Strength:    -       Right Upper Extremity: generally 4- distally, 3 in shoulder  -       Left Upper Extremity: elbow flexion 2-; no movement in other joints attempted on command  **decreased accuracy due to pt's difficulty following commands.       Treatment & Education:  OT re-eval; POC remains the same.   Family and patient educated on OT POC.     Patient left left sidelying on wedge with all lines intact, call button in reach, and family present    GOALS:   Multidisciplinary Problems       Occupational Therapy Goals          Problem: Occupational Therapy    Goal Priority Disciplines Outcome Interventions   Occupational Therapy Goal     OT, PT/OT Ongoing, Progressing    Description: Goals to be met by: 10/25/2022     Patient will increase functional independence with ADLs by performing:    Feeding with Set-up Assistance.  UE Dressing with Moderate Assistance.  LE Dressing with Moderate Assistance.  Grooming while seated at sink with Minimal Assistance.  Toileting from  bed level with Maximum Assistance for hygiene and clothing management.                          History:     Past Medical History:   Diagnosis Date    Arthritis     Atherosclerosis     Cataracts, bilateral     COPD (chronic obstructive pulmonary disease)     Gallstones     HTN (hypertension)     PAD (peripheral artery disease)     Seizure     Stomach ulcer     Stroke     Vitamin D deficiency          Past Surgical History:   Procedure Laterality Date    CREATION OF FEMORAL-TIBIAL ARTERY BYPASS      INFERIOR VENA CAVA ANGIOPLASTY / STENTING      LEG AMPUTATION THROUGH KNEE      right    REPAIR OF CAROTID ARTERY      TOE AMPUTATION      left great toe       Time Tracking:     OT Date of Treatment: 10/17/22  OT Start Time: 1352  OT Stop Time: 1410  OT Total Time (min): 18 min    Billable Minutes:Re-eval 18 min    10/17/2022

## 2022-10-17 NOTE — PT/OT/SLP PROGRESS
"Physical Therapy Treatment    Patient Name:  Daina Kinsey   MRN:  [id number]    Recommendations:     Discharge Recommendations:  nursing facility, skilled   Discharge Equipment Recommendations: wheelchair   Barriers to discharge:       Assessment:     Daina Kinsey is a 82 y.o. female admitted with a medical diagnosis of Stroke with hx R AKA.  She presents with the following impairments/functional limitations:  weakness, gait instability, impaired endurance, impaired balance, impaired self care skills, impaired cognition, impaired functional mobility, decreased coordination.    Rehab Prognosis: Fair; patient would benefit from acute skilled PT services to address these deficits and reach maximum level of function.    Recent Surgery: * No surgery found *      Plan:     During this hospitalization, patient to be seen 3 x/week to address the identified rehab impairments via therapeutic activities, therapeutic exercises and progress toward the following goals:    Plan of Care Expires:  11/08/22    Subjective     Chief Complaint: " I'm cold; cover me up."  Patient/Family Comments/goals:   Pain/Comfort:         Objective:     Communicated with NSG prior to session.  Patient found  on wedge  with telemetry upon PTA entry to room.     General Precautions: Standard, fall   Orthopedic Precautions:N/A   Braces: N/A  Respiratory Status: Room air     / 74 HR96     Functional Mobility:  Bed: TotalA supine to sit EOB  Static sitting balance: MaxA  for sitting balance. Pt sat with a slouch posture and display posterior lean. Pt had difficulties following commands. Pt tolerated sitting EOB for approximately 8 min., no eye contact, pt opened her eyes a few times but mostly kept her eyes closed and c/o feeling cold.      Patient left  left side lying on pink wedge to offload buttocks  with all lines intact and call button in reach..    GOALS:   Multidisciplinary Problems       Physical Therapy Goals          Problem: " Physical Therapy    Goal Priority Disciplines Outcome Goal Variances Interventions   Physical Therapy Goal     PT, PT/OT Ongoing, Progressing     Description: Goals to be met by: 2022     Patient will increase functional independence with mobility by performin. Supine to sit with Modified [place]  2. Sit to supine with Modified [place]  3. Sit to stand transfer with Contact Guard Assistance  4. Bed to chair transfer with Minimal Assistance using No Assistive Device  5. Wheelchair propulsion x500 feet with Stand-by Assistance using bilateral uppper extremities                         Time Tracking:     PT Received On:    PT Start Time: 926     PT Stop Time: 936  PT Total Time (min): 10 min     Billable Minutes: Therapeutic Activity 10    Treatment Type: Treatment  PT/PTA: PTA     PTA Visit Number: 3     10/17/2022

## 2022-10-17 NOTE — PROGRESS NOTES
[facility]  Hospital Medicine Progress Note        Chief Complaint: Inpatient Follow-up     HPI:   82-year-old female with significant history of multiple CVAs with residual left-sided weakness, left facial droop, dysarthria and baseline confusion.  Latest CVA in February, 2022.  Bubble study negative.  Ultrasound carotid with less than 50% stenosis bilaterally.  No AFib.  Patient was then discharged on Eliquis, Plavix, statin.  Aspirin was discontinued.  Patient presented back to the ED with new onset aphasia-expressive.  Patient was afebrile, hypertensive in the ED.  EKG-NSR.  CT head was negative.  Did confirm the old infarcts.  Patient was transferred to our hospital for Neurology evaluation.  Stroke protocol initiated.  Workup ordered.  Neurology consulted.  MRI negative for CVA.  Symptoms most likely secondary to dehydration, on IV hydration.  Family requesting skilled nursing facility placement.  Home meds resumed as appropriate.  Neurology recommended to continue Eliquis, Plavix.  Patient was agitated on 10/09, gave her a dose of Haldol following which she had worsening mentation with lethargic, drowsiness and hypotension, hypotension improved with IV fluids.  Holding all mood altering drugs.  Placed on IV fluids and kept NPO.  CT head, ABG non impressive.  Chest x-ray consistent with aspiration pneumonia, initiated appropriate antibiotics-Cipro and Flagyl.  Patient has allergy to penicillin.  Patient still lethargic and drowsy on 10/10.  Keeping NPO, on IV fluids.  Mentation much better on 10/11.  Speech to re-evaluate to clear for p.o. intake.  IV fluids continued.        Interval Hx:    Patient seen and examined , no family member at bedside today   Patient is very drowsy.  Likely due to Keppra mirtazapine use.  Discontinue mirtazapine   Replete potassium with IV 40 mEq x 1   No family member at bedside   Discussed with nurse.        Objective/physical exam:  General: In no  acute distress, afebrile sleepy did open her eyes   Chest: Clear to auscultation bilaterally  Heart: S1, S2, no appreciable murmur  Abdomen: Soft, nontender, BS +  MSK: Warm, no lower extremity edema, no clubbing or cyanosis  Neurologic:  Sleeping at the time of my rounds  did open her eyes     Assessment/Plan:  Hypokalemia   Uncontrolled hypertension, improving   Acute encephalopathy possibly secondary to left PCA CVA  Evolving ischemic changes in the left PCA distribution  Aspiration PNA   Sepsis secondary to above -improved  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  Vascular dementia with behavioral disturbance  Mild bilateral carotid artery stenosis   History of PAD status post right fem-pop bypass, above knee amputation, left iliac stenting   History of CAD   Seizure disorder  HLD  COPD with no acute exacerbation   Osteoarthritis       plan   Discontinue mirtazapine   Replete potassium with IV 40 mEq x 1   Adjust hydralazine to t.i.d. dosing  await PT recommendations for discharge  Cthead is stable   EEG done yesterday showed diffuse slowing consistent with moderate cerebral dysfunction which is a nonspecific sign  F/up neuro recs   On a diet   s/p Cipro, Flagyl for aspiration pneumonitis as she is allergic to penicillin.    Previous echocardiogram with negative bubble study   Ultrasound carotid was limited study secondary to patient not being cooperative so we have repeated it please follow-up on results  Continue Other home meds-statin,montelukast, nicotine patch  Continue IV Keppra        DVT prophylaxis- scd     Disposition medically cleared for discharge await PT recs for discharge        VITAL SIGNS: 24 HRS MIN & MAX LAST   Temp  Min: 97.9 °F (36.6 °C)  Max: 99.5 °F (37.5 °C) 98.1 °F (36.7 °C)   BP  Min: 120/66  Max: 177/80 120/66   Pulse  Min: 87  Max: 101  97   Resp  Min: 16  Max: 20 18   SpO2  Min: 94 %  Max: 99 % 99 %       Recent Labs   Lab 10/12/22  0543 10/13/22  0826  10/14/22  0356   WBC 11.4 8.8 8.2   RBC 3.32* 3.37* 3.36*   HGB 10.1* 10.3* 10.2*   HCT 31.6* 31.4* 31.4*   MCV 95.2* 93.2 93.5   MCH 30.4 30.6 30.4   MCHC 32.0* 32.8* 32.5*   RDW 14.6 14.5 14.6    161 159   MPV 11.3* 10.6* 10.3       Recent Labs   Lab 10/11/22  0601 10/12/22  0441 10/13/22  0435 10/14/22  0356 10/15/22  0545 10/16/22  1927    138   < > 136 140 141   K 3.7 3.6   < > 2.9* 3.2* 3.1*   CO2 23 19*   < > 19* 20* 20*   BUN 19.7 15.2   < > 6.6* 5.4* 4.5*   CREATININE 0.90 0.88   < > 0.84 0.77 0.73   CALCIUM 8.7 8.9   < > 8.4 8.2* 8.7   MG  --   --   --   --   --  1.70   ALBUMIN 2.9* 2.9*  --   --   --   --    ALKPHOS 75 72  --   --   --   --    ALT 12 12  --   --   --   --    AST 37* 35*  --   --   --   --    BILITOT 0.4 0.6  --   --   --   --     < > = values in this interval not displayed.          Microbiology Results (last 7 days)       Procedure Component Value Units Date/Time    Blood Culture [[id number]]  (Normal) Collected: 10/10/22 0730    Order Status: Completed Specimen: Blood Updated: 10/15/22 1334     CULTURE, BLOOD ([facility]) Final Report: At 5 days. No growth    Blood Culture [[id number]]  (Normal) Collected: 10/10/22 0730    Order Status: Completed Specimen: Blood Updated: 10/15/22 1334     CULTURE, BLOOD ([facility]) Final Report: At 5 days. No growth             See below for Radiology    Scheduled Med:   atorvastatin  40 mg Oral Daily    cloNIDine 0.1 mg/24 hr td ptwk  1 patch Transdermal Q7 Days    diltiaZEM  120 mg Oral Daily    famotidine (PF)  20 mg Intravenous Daily    hydrALAZINE  75 mg Oral Q8H    isosorbide mononitrate  120 mg Oral Daily    levetiracetam (KEPPRA) IVPB 100ml  250 mg Intravenous Q12H    lisinopriL  20 mg Oral Daily    magnesium sulfate IVPB  2 g Intravenous Once    metoprolol succinate  50 mg Oral Daily    montelukast  10 mg Oral Daily    nicotine  1 patch Transdermal Daily    potassium chloride in water  40 mEq Intravenous Once        Continuous Infusions:        PRN Meds:  acetaminophen, acetaminophen, aluminum-magnesium hydroxide-simethicone, dextrose 10%, dextrose 10%, glucagon (human recombinant), hydrALAZINE, insulin aspart U-100, labetalol, labetaloL, melatonin, ondansetron, polyethylene glycol, prochlorperazine, senna-docusate 8.6-50 mg, simethicone, sodium chloride 0.9%, sodium chloride 0.9%       VTE prophylaxis:     Patient condition:  Stable/Fair/Guarded/ Serious/ Critical    Anticipated discharge and Disposition:         All diagnosis and differential diagnosis have been reviewed; assessment and plan has been documented; I have personally reviewed the labs and test results that are presently available; I have reviewed the patients medication list; I have reviewed the consulting providers response and recommendations. I have reviewed or attempted to review medical records based upon their availability    All of the patient's questions have been  addressed and answered. Patient's is agreeable to the above stated plan. I will continue to monitor closely and make adjustments to medical management as needed.  _____________________________________________________________________    Nutrition Status:    Radiology:  CV Ultrasound Bilateral Doppler Carotid  Difficult study due to patient being uncooperative/combative. Attempted   twice to get better images. May be best to compare with prior study done   on 10/7/22.    The right internal carotid artery demonstrated elevated velocities that   indicate severe 70-99% stenosis.  The left internal carotid artery demonstrated elevated velocities that   indicate 50-69% stenosis.    The left vertebral artery is patent with antegrade flow.    Unable to visualize the right vertebral artery.  CT Head Without Contrast  EXAMINATION  CT HEAD WITHOUT CONTRAST    CLINICAL HISTORY  Stroke, follow up;re evaluate left occipital petechial hemorrhage seen on MRI;    TECHNIQUE  Axial non-contrast CT images of the head were acquired and  multiplanar reconstructions accomplished by a CT technologist at a separate workstation, pushed to PACS for physician review.    COMPARISON  13 October 2022    FINDINGS  Images were reviewed in subdural, brain, soft tissue, and bone windows.    Exam quality: Motion/streak artifact limits assessment of the posterior fossa.    There are similar areas of widespread hypo density at the bilateral supratentorial parenchyma consistent with multifocal large territory ischemic infarct.  No new hyperdensity is appreciated to suggest hemorrhagic conversion.  No new focal intracranial abnormality, localized mass effect, or midline shift is identified in comparison.  There is no hydrocephalus.    Visualized osseous structures and extracranial soft tissues are unchanged.    IMPRESSION  1. Redemonstrated multiple bilateral supratentorial ischemic infarcts.  2. No new or worsening abnormality.    RADIATION DOSE  Automated tube current modulation, weight-based exposure dosing, and/or iterative reconstruction technique utilized to reach lowest reasonably achievable exposure rate.    DLP: 2014 mGy*cm    Electronically signed by: Connor Damico  Date:    10/15/2022  Time:    08:13      Alfie Santiago MD   10/17/2022

## 2022-10-18 NOTE — PLAN OF CARE
Spoke to Leeanne regarding SNF placement. Leeanne will call and evaluate facilities in [place] and [place]. She will notify CM upon decision.

## 2022-10-18 NOTE — PROGRESS NOTES
[facility] - 9th Floor Med Surg  Neurology  Progress Note    Patient Name: Daina Kinsey  MRN: [id number]  Admission Date: 10/7/2022  Hospital Length of Stay: 11 days  Code Status: Full Code   Attending Provider: Lluvia Alberto MD  Primary Care Physician: Aric Machado MD   Principal Problem:Stroke    HPI:   Daina Kinsey is a 82 y.o. female who presented to [facility] on 10/7/2022 with reports of aphasia.  LKN 10/7 at 7:30am.  HTN upon ED arrival.  CTh negative.  Transferred to [facility] for stroke workup.    Past medical history of vascular dementia, stroke (left sided weakness, left facial droop, confusion), on Eliquis and Plavix 2/2 multiple territory strokes, arthritis, bilateral cataracts, COPD, HTN, PAD s/p right AKA, CAD s/p stents, seizure.        Overview/Hospital Course:  (10/8/22) Back to baseline.  MRI brain negative for acute infarct.  Neurology s/o.  (10/9/22) Patient agitated; given Haldol 5mg x1 dose and agitation worsened. .... later that same day, patient had episode of hypotension which responded to fluid bolus.  ... mentation change and hypotension thought to be medication induced.  (10/10/22) Per Hospitalist note, patient lethargic.  EEG: mild background slowing, otherwise unremarkable EEG.  (10/11/22) Hospitalist notes encephalopathy improved, back to baseline.  Repeat CTh unremarkable.  (10/12/22) Hospitalist notes patient was awake all night, and therefore sleeping during the day.  Also noted patient refused medications and was more confused when awake.  (10/13/22) Patient sleepy, only moaned in response to son telling her good morning.  Repeat CTh showed evolving ischemic changes in the left PCA distribution.  (10/14/22) MRI brain showed acute infarct in the left PCA territory and right MCA territory with punctate focus of acute ischemia in the left frontal lobe and  Left occipital lobe petechial hemorrhage.          Subjective:     Interval History: Patient sitting up in bed.  Asking for water.  Son at bedside, on the phone with sister discussing skilled nursing placement options.      Current Facility-Administered Medications   Medication Dose Route Frequency Provider Last Rate Last Admin    acetaminophen tablet 1,000 mg  1,000 mg Oral Q6H PRN Samaria Bermeo MD   1,000 mg at 10/08/22 2330    acetaminophen tablet 650 mg  650 mg Oral Q4H PRN Samaria Bermeo MD   650 mg at 10/12/22 1532    aluminum-magnesium hydroxide-simethicone 200-200-20 mg/5 mL suspension 30 mL  30 mL Oral QID PRN Samaria Bermeo MD        atorvastatin tablet 40 mg  40 mg Oral Daily Lluvia Alberto MD   40 mg at 10/18/22 0825    cloNIDine 0.1 mg/24 hr td ptwk 1 patch  1 patch Transdermal Q7 Days Summer Ga MD   1 patch at 10/13/22 2036    dextrose 10% bolus 125 mL  12.5 g Intravenous PRN Cecilia Street, BILLP   Stopped at 10/16/22 2142    dextrose 10% bolus 250 mL  25 g Intravenous PRN Cecilia Street, RHODA        diltiaZEM 24 hr capsule 120 mg  120 mg Oral Daily Lluvia Alberto MD   120 mg at 10/18/22 0826    famotidine (PF) injection 20 mg  20 mg Intravenous Daily Samaria Bermeo MD   20 mg at 10/18/22 0825    glucagon (human recombinant) injection 1 mg  1 mg Intramuscular PRN Cecilia Street, RHODA        hydrALAZINE injection 10 mg  10 mg Intravenous Q4H PRN Samaria Bermeo MD   10 mg at 10/15/22 2033    hydrALAZINE tablet 75 mg  75 mg Oral Q8H Alfie Santiago MD   75 mg at 10/18/22 0532    insulin aspart U-100 injection 0-5 Units  0-5 Units Subcutaneous Q6H PRN Cecilia Street, RHODA        isosorbide mononitrate 24 hr tablet 120 mg  120 mg Oral Daily Lluvia Alberto MD   120 mg at 10/18/22 0825    labetaloL injection 10 mg  10 mg Intravenous Q4H PRN Samaria Bermeo MD   10 mg at 10/14/22 2142    labetaloL injection 5 mg  5 mg Intravenous Q2H PRN Lluvia Alberto MD   5 mg at 10/15/22 0017    levETIRAcetam (KEPPRA) 250 mg in dextrose 5 % 100 mL IVPB  250 mg Intravenous Q12H Lluvia Alberto  mL/hr at 10/18/22 0841 250 mg at 10/18/22 0841     lisinopriL tablet 20 mg  20 mg Oral Daily Lluvia Alberto MD   20 mg at 10/18/22 0825    melatonin tablet 6 mg  6 mg Oral Nightly PRN Samaira Bermeo MD   6 mg at 10/08/22 2023    metoprolol succinate (TOPROL-XL) 24 hr tablet 50 mg  50 mg Oral Daily Lluvia Alberto MD   50 mg at 10/18/22 0825    montelukast tablet 10 mg  10 mg Oral Daily Lluvia Alberto MD   10 mg at 10/18/22 0826    nicotine 21 mg/24 hr 1 patch  1 patch Transdermal Daily Lluvia Alberto MD   1 patch at 10/18/22 0826    ondansetron injection 4 mg  4 mg Intravenous Q4H PRN Samaria Bermeo MD   4 mg at 10/09/22 2145    polyethylene glycol packet 17 g  17 g Oral BID PRN Samaria Bermeo MD        prochlorperazine injection Soln 5 mg  5 mg Intravenous Q6H PRN Samaria Bermeo MD        senna-docusate 8.6-50 mg per tablet 1 tablet  1 tablet Oral BID PRN Samaria Bermeo MD        simethicone chewable tablet 80 mg  1 tablet Oral QID PRN Samaria Bermeo MD        sodium chloride 0.9% flush 10 mL  10 mL Intravenous PRN Brennon Frias DO        sodium chloride 0.9% flush 10 mL  10 mL Intravenous PRN Summer Ga MD           Review of Systems  Limited ROS due to family interference    Objective:     Vital Signs (Most Recent):  Temp: 98 °F (36.7 °C) (10/18/22 1138)  Pulse: 88 (10/18/22 1138)  Resp: 13 (10/18/22 1138)  BP: (!) 151/65 (10/18/22 1138)  SpO2: 97 % (10/18/22 1138)   Vital Signs (24h Range):  Temp:  [97.4 °F (36.3 °C)-99.2 °F (37.3 °C)] 98 °F (36.7 °C)  Pulse:  [88-97] 88  Resp:  [13-20] 13  SpO2:  [93 %-97 %] 97 %  BP: (120-159)/(65-81) 151/65     Weight: 47.6 kg (104 lb 15 oz)  Body mass index is 19.84 kg/m².    Physical Exam  Alert, asking for water  Limited PE due to family interference    Significant Labs: BMP:   Recent Labs   Lab 10/16/22  1927      K 3.1*   CO2 20*   BUN 4.5*   CREATININE 0.73   CALCIUM 8.7   MG 1.70     CBC: No results for input(s): WBC, HGB, HCT, PLT in the last 48 hours.    Significant Imaging: I have reviewed all pertinent  imaging results/findings within the past 24 hours.    Stroke workup:  -MRI brain (10/8): No definite acute intracranial abnormality.  Advanced generalized involutional change.  Extensive severe chronic microvascular white matter ischemic change, and multiple moderate-sized remote infarcts involving the left frontal lobe and right parietal and temporal lobes.  -CTA h/n (10/9):  No abnormality of the intracranial vasculature.  No evidence for stenosis, occlusion, aneurysm, or thrombus.   -CUS: Bilateral CCA irregular calcific plaque extending into the bulb.  RT ICA moderate irregular calcific plaque with elevated velocities. LT ICA moderate/severe irregular calcific plaque with elevated velocities.  -LDL: 102  -A1c: 5.6  -home medications include Atorvastatin 20mg daily, Plavix 75mg daily, and Eliquis 5mg BID  -CTh (10/10): No acute intracranial abnormality identified.  Findings of unchanged chronic microvascular ischemic disease.   -CTh (10/13): Evolving ischemic changes in the left PCA territory without evidence of hemorrhagic transformation.   -MRI brain (10/14):   1. Acute infarcts in the left PCA territory and right MCA territory, with punctate focus of acute ischemia in the left frontal lobe.  2. Curvilinear hemosiderin staining in the left occipital lobe consistent with petechial hemorrhage.  -CTh (10/15): Redemonstrated multiple bilateral supratentorial ischemic infarcts.  No new or worsening abnormality.    Assessment and Plan:     * Stroke  Stroke - bilateral supratentorial infarcts    Eliquis and Plavix on hold due to risk of bleeding ... will discuss when to resume with MD during rounds  PT recommending skilled nursing placement  Case management working on SNF placement        1635:  Rounds with Dr Thomson  Will plan to repeat CTh on Thursday (10/20)        RHODA Bowers  Neurology  [facility] - 9th Floor Med Surg

## 2022-10-18 NOTE — ASSESSMENT & PLAN NOTE
Stroke - bilateral supratentorial infarcts    Eliquis and Plavix on hold due to risk of bleeding ... will discuss when to resume with MD during rounds  PT recommending skilled nursing placement  Case management working on SNF placement

## 2022-10-18 NOTE — SUBJECTIVE & OBJECTIVE
Subjective:     Interval History: Patient sitting up in bed. Asking for water.  Son at bedside, on the phone with sister discussing skilled nursing placement options.      Current Facility-Administered Medications   Medication Dose Route Frequency Provider Last Rate Last Admin    acetaminophen tablet 1,000 mg  1,000 mg Oral Q6H PRN Samaria Bermeo MD   1,000 mg at 10/08/22 2330    acetaminophen tablet 650 mg  650 mg Oral Q4H PRN Samaria Bermeo MD   650 mg at 10/12/22 1532    aluminum-magnesium hydroxide-simethicone 200-200-20 mg/5 mL suspension 30 mL  30 mL Oral QID PRN Samaria Bermeo MD        atorvastatin tablet 40 mg  40 mg Oral Daily Lluvia Alberto MD   40 mg at 10/18/22 0825    cloNIDine 0.1 mg/24 hr td ptwk 1 patch  1 patch Transdermal Q7 Days Summer Ga MD   1 patch at 10/13/22 2036    dextrose 10% bolus 125 mL  12.5 g Intravenous PRN RHODA Smith   Stopped at 10/16/22 2142    dextrose 10% bolus 250 mL  25 g Intravenous PRN RHODA Smith        diltiaZEM 24 hr capsule 120 mg  120 mg Oral Daily Lluvia Alberto MD   120 mg at 10/18/22 0826    famotidine (PF) injection 20 mg  20 mg Intravenous Daily Samaria Bermeo MD   20 mg at 10/18/22 0825    glucagon (human recombinant) injection 1 mg  1 mg Intramuscular PRN RHODA Smith        hydrALAZINE injection 10 mg  10 mg Intravenous Q4H PRN Samaria Bermeo MD   10 mg at 10/15/22 2033    hydrALAZINE tablet 75 mg  75 mg Oral Q8H Alfie Santiago MD   75 mg at 10/18/22 0532    insulin aspart U-100 injection 0-5 Units  0-5 Units Subcutaneous Q6H PRN RHODA Smith        isosorbide mononitrate 24 hr tablet 120 mg  120 mg Oral Daily Lluvia Alberto MD   120 mg at 10/18/22 0825    labetaloL injection 10 mg  10 mg Intravenous Q4H PRN Samaria Bermeo MD   10 mg at 10/14/22 2142    labetaloL injection 5 mg  5 mg Intravenous Q2H PRN Lluvia Alberto MD   5 mg at 10/15/22 0017    levETIRAcetam (KEPPRA) 250 mg in dextrose 5 % 100 mL IVPB  250 mg Intravenous Q12H Lluvia  MD Remy 200 mL/hr at 10/18/22 0841 250 mg at 10/18/22 0841    lisinopriL tablet 20 mg  20 mg Oral Daily Lluvia Alberto MD   20 mg at 10/18/22 0825    melatonin tablet 6 mg  6 mg Oral Nightly PRN Samaria Bermeo MD   6 mg at 10/08/22 2023    metoprolol succinate (TOPROL-XL) 24 hr tablet 50 mg  50 mg Oral Daily Lluvia Alberto MD   50 mg at 10/18/22 0825    montelukast tablet 10 mg  10 mg Oral Daily Lluvia Alberto MD   10 mg at 10/18/22 0826    nicotine 21 mg/24 hr 1 patch  1 patch Transdermal Daily Lluvia Alberto MD   1 patch at 10/18/22 0826    ondansetron injection 4 mg  4 mg Intravenous Q4H PRN Samaria Bermeo MD   4 mg at 10/09/22 2145    polyethylene glycol packet 17 g  17 g Oral BID PRN Samaria Bermeo MD        prochlorperazine injection Soln 5 mg  5 mg Intravenous Q6H PRN Samaria Bermeo MD        senna-docusate 8.6-50 mg per tablet 1 tablet  1 tablet Oral BID PRN Samaria Bermeo MD        simethicone chewable tablet 80 mg  1 tablet Oral QID PRN Samaria Bermeo MD        sodium chloride 0.9% flush 10 mL  10 mL Intravenous PRN Brennon GREENWOOD Rodriguez,         sodium chloride 0.9% flush 10 mL  10 mL Intravenous PRN Summer Ga MD           Review of Systems  Limited ROS due to family interference    Objective:     Vital Signs (Most Recent):  Temp: 98 °F (36.7 °C) (10/18/22 1138)  Pulse: 88 (10/18/22 1138)  Resp: 13 (10/18/22 1138)  BP: (!) 151/65 (10/18/22 1138)  SpO2: 97 % (10/18/22 1138)   Vital Signs (24h Range):  Temp:  [97.4 °F (36.3 °C)-99.2 °F (37.3 °C)] 98 °F (36.7 °C)  Pulse:  [88-97] 88  Resp:  [13-20] 13  SpO2:  [93 %-97 %] 97 %  BP: (120-159)/(65-81) 151/65     Weight: 47.6 kg (104 lb 15 oz)  Body mass index is 19.84 kg/m².    Physical Exam  Alert, asking for water  Limited PE due to family interference    Significant Labs: BMP:   Recent Labs   Lab 10/16/22  1927      K 3.1*   CO2 20*   BUN 4.5*   CREATININE 0.73   CALCIUM 8.7   MG 1.70     CBC: No results for input(s): WBC, HGB, HCT, PLT in the last 48  hours.    Significant Imaging: I have reviewed all pertinent imaging results/findings within the past 24 hours.    Stroke workup:  -MRI brain (10/8): No definite acute intracranial abnormality.  Advanced generalized involutional change.  Extensive severe chronic microvascular white matter ischemic change, and multiple moderate-sized remote infarcts involving the left frontal lobe and right parietal and temporal lobes.  -CTA h/n (10/9):  No abnormality of the intracranial vasculature.  No evidence for stenosis, occlusion, aneurysm, or thrombus.   -CUS: Bilateral CCA irregular calcific plaque extending into the bulb.  RT ICA moderate irregular calcific plaque with elevated velocities. LT ICA moderate/severe irregular calcific plaque with elevated velocities.  -LDL: 102  -A1c: 5.6  -home medications include Atorvastatin 20mg daily, Plavix 75mg daily, and Eliquis 5mg BID  -CTh (10/10): No acute intracranial abnormality identified.  Findings of unchanged chronic microvascular ischemic disease.   -CTh (10/13): Evolving ischemic changes in the left PCA territory without evidence of hemorrhagic transformation.   -MRI brain (10/14):   1. Acute infarcts in the left PCA territory and right MCA territory, with punctate focus of acute ischemia in the left frontal lobe.  2. Curvilinear hemosiderin staining in the left occipital lobe consistent with petechial hemorrhage.  -CTh (10/15): Redemonstrated multiple bilateral supratentorial ischemic infarcts.  No new or worsening abnormality.

## 2022-10-18 NOTE — PROGRESS NOTES
[facility]  Hospital Medicine Progress Note        Chief Complaint: Inpatient Follow-up     HPI:   82-year-old female with significant history of multiple CVAs with residual left-sided weakness, left facial droop, dysarthria and baseline confusion.  Latest CVA in February, 2022.  Bubble study negative.  Ultrasound carotid with less than 50% stenosis bilaterally.  No AFib.  Patient was then discharged on Eliquis, Plavix, statin.  Aspirin was discontinued.  Patient presented back to the ED with new onset aphasia-expressive.  Patient was afebrile, hypertensive in the ED.  EKG-NSR.  CT head was negative.  Did confirm the old infarcts.  Patient was transferred to our hospital for Neurology evaluation.  Stroke protocol initiated.  Workup ordered.  Neurology consulted.  MRI negative for CVA.  Symptoms most likely secondary to dehydration, on IV hydration.  Family requesting skilled nursing facility placement.  Home meds resumed as appropriate.  Neurology recommended to continue Eliquis, Plavix.  Patient was agitated on 10/09, gave her a dose of Haldol following which she had worsening mentation with lethargic, drowsiness and hypotension, hypotension improved with IV fluids.  Holding all mood altering drugs.  Placed on IV fluids and kept NPO.  CT head, ABG non impressive.  Chest x-ray consistent with aspiration pneumonia, initiated appropriate antibiotics-Cipro and Flagyl.  Patient has allergy to penicillin.  Patient still lethargic and drowsy on 10/10.  Keeping NPO, on IV fluids.  Mentation much better on 10/11.  Speech to re-evaluate to clear for p.o. intake.  IV fluids continued.       Interval Hx:   Patient seen and examined at bedside   She is asking to drink water.  Offered her a boost.    Family member at bedside informed PT has recommended skilled nursing facility.   will discuss with family members as to options   Continue PT OT        Objective/physical exam:  General: In  no acute distress, afebrile sleepy did open her eyes   Chest: Clear to auscultation bilaterally  Heart: S1, S2, no appreciable murmur  Abdomen: Soft, nontender, BS +  MSK: Warm, no lower extremity edema, no clubbing or cyanosis  Neurologic:  Sleeping at the time of my rounds  did open her eyes     Assessment/Plan:  Uncontrolled hypertension, improving   Acute encephalopathy possibly secondary to left PCA CVA  Evolving ischemic changes in the left PCA distribution  Aspiration PNA   Sepsis secondary to above -improved  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  Vascular dementia with behavioral disturbance  Mild bilateral carotid artery stenosis   History of PAD status post right fem-pop bypass, above knee amputation, left iliac stenting   History of CAD   Seizure disorder  HLD  COPD with no acute exacerbation   Osteoarthritis       plan  Continue PT/OT  Discontinue mirtazapine   Replete potassium with IV 40 mEq x 1   Adjust hydralazine to t.i.d. dosing  await PT recommendations for discharge  Cthead is stable   EEG done yesterday showed diffuse slowing consistent with moderate cerebral dysfunction which is a nonspecific sign  F/up neuro recs   On a diet   s/p Cipro, Flagyl for aspiration pneumonitis as she is allergic to penicillin.    Previous echocardiogram with negative bubble study   Ultrasound carotid was limited study secondary to patient not being cooperative so we have repeated it please follow-up on results  Continue Other home meds-statin,montelukast, nicotine patch  Continue IV Keppra        DVT prophylaxis- scd     Disposition medically cleared for discharge await PT recs for discharge       Family member at bedside informed PT has recommended skilled nursing facility.   will discuss with family       VITAL SIGNS: 24 HRS MIN & MAX LAST   Temp  Min: 97.4 °F (36.3 °C)  Max: 99.2 °F (37.3 °C) 98 °F (36.7 °C)   BP  Min: 120/65  Max: 159/81 (!) 151/65     Pulse  Min: 88   Max: 97  88   Resp  Min: 13  Max: 20 13   SpO2  Min: 93 %  Max: 97 % 97 %       Recent Labs   Lab 10/12/22  0543 10/13/22  0835 10/14/22  0356   WBC 11.4 8.8 8.2   RBC 3.32* 3.37* 3.36*   HGB 10.1* 10.3* 10.2*   HCT 31.6* 31.4* 31.4*   MCV 95.2* 93.2 93.5   MCH 30.4 30.6 30.4   MCHC 32.0* 32.8* 32.5*   RDW 14.6 14.5 14.6    161 159   MPV 11.3* 10.6* 10.3       Recent Labs   Lab 10/12/22  0441 10/13/22  0435 10/14/22  0356 10/15/22  0545 10/16/22  1927      < > 136 140 141   K 3.6   < > 2.9* 3.2* 3.1*   CO2 19*   < > 19* 20* 20*   BUN 15.2   < > 6.6* 5.4* 4.5*   CREATININE 0.88   < > 0.84 0.77 0.73   CALCIUM 8.9   < > 8.4 8.2* 8.7   MG  --   --   --   --  1.70   ALBUMIN 2.9*  --   --   --   --    ALKPHOS 72  --   --   --   --    ALT 12  --   --   --   --    AST 35*  --   --   --   --    BILITOT 0.6  --   --   --   --     < > = values in this interval not displayed.          Microbiology Results (last 7 days)       Procedure Component Value Units Date/Time    Blood Culture [[id number]]  (Normal) Collected: 10/10/22 0730    Order Status: Completed Specimen: Blood Updated: 10/15/22 1334     CULTURE, BLOOD ([facility]) Final Report: At 5 days. No growth    Blood Culture [[id number]]  (Normal) Collected: 10/10/22 0730    Order Status: Completed Specimen: Blood Updated: 10/15/22 1334     CULTURE, BLOOD ([facility]) Final Report: At 5 days. No growth             See below for Radiology    Scheduled Med:   atorvastatin  40 mg Oral Daily    cloNIDine 0.1 mg/24 hr td ptwk  1 patch Transdermal Q7 Days    diltiaZEM  120 mg Oral Daily    famotidine (PF)  20 mg Intravenous Daily    hydrALAZINE  75 mg Oral Q8H    isosorbide mononitrate  120 mg Oral Daily    levetiracetam (KEPPRA) IVPB 100ml  250 mg Intravenous Q12H    lisinopriL  20 mg Oral Daily    metoprolol succinate  50 mg Oral Daily    montelukast  10 mg Oral Daily    nicotine  1 patch Transdermal Daily        Continuous Infusions:       PRN Meds:  acetaminophen, acetaminophen,  aluminum-magnesium hydroxide-simethicone, dextrose 10%, dextrose 10%, glucagon (human recombinant), hydrALAZINE, insulin aspart U-100, labetalol, labetaloL, melatonin, ondansetron, polyethylene glycol, prochlorperazine, senna-docusate 8.6-50 mg, simethicone, sodium chloride 0.9%, sodium chloride 0.9%       VTE prophylaxis:     Patient condition:  Stable/Fair/Guarded/ Serious/ Critical    Anticipated discharge and Disposition:         All diagnosis and differential diagnosis have been reviewed; assessment and plan has been documented; I have personally reviewed the labs and test results that are presently available; I have reviewed the patients medication list; I have reviewed the consulting providers response and recommendations. I have reviewed or attempted to review medical records based upon their availability    All of the patient's questions have been  addressed and answered. Patient's is agreeable to the above stated plan. I will continue to monitor closely and make adjustments to medical management as needed.  _____________________________________________________________________    Nutrition Status:    Radiology:  CV Ultrasound Bilateral Doppler Carotid  Difficult study due to patient being uncooperative/combative. Attempted   twice to get better images. May be best to compare with prior study done   on 10/7/22.    The right internal carotid artery demonstrated elevated velocities that   indicate severe 70-99% stenosis.  The left internal carotid artery demonstrated elevated velocities that   indicate 50-69% stenosis.    The left vertebral artery is patent with antegrade flow.    Unable to visualize the right vertebral artery.  CT Head Without Contrast  EXAMINATION  CT HEAD WITHOUT CONTRAST    CLINICAL HISTORY  Stroke, follow up;re evaluate left occipital petechial hemorrhage seen on MRI;    TECHNIQUE  Axial non-contrast CT images of the head were acquired and multiplanar reconstructions accomplished by a CT  technologist at a separate workstation, pushed to PACS for physician review.    COMPARISON  13 October 2022    FINDINGS  Images were reviewed in subdural, brain, soft tissue, and bone windows.    Exam quality: Motion/streak artifact limits assessment of the posterior fossa.    There are similar areas of widespread hypo density at the bilateral supratentorial parenchyma consistent with multifocal large territory ischemic infarct.  No new hyperdensity is appreciated to suggest hemorrhagic conversion.  No new focal intracranial abnormality, localized mass effect, or midline shift is identified in comparison.  There is no hydrocephalus.    Visualized osseous structures and extracranial soft tissues are unchanged.    IMPRESSION  1. Redemonstrated multiple bilateral supratentorial ischemic infarcts.  2. No new or worsening abnormality.    RADIATION DOSE  Automated tube current modulation, weight-based exposure dosing, and/or iterative reconstruction technique utilized to reach lowest reasonably achievable exposure rate.    DLP: 2014 mGy*cm    Electronically signed by: Connor Damico  Date:    10/15/2022  Time:    08:13      Alfie Santiago MD   10/18/2022

## 2022-10-18 NOTE — NURSING
During shift change two nurses went into the pt room to hand over, the incoming evening nurse said introduced herself and ask the pt how she was doing and the son got upset and went off screaming using inappropriate words, other charge nurse  notified and security and sup. Was called and son was sent out, the other son Tucker Russell was notified

## 2022-10-19 NOTE — PROGRESS NOTES
[facility]  Hospital Medicine Progress Note        Chief Complaint: Inpatient follow-up on stroke    HPI:   Patient is an 82-year-old  female with past medical history of multiple CVAs with residual left-sided weakness, left facial droop, dysarthria and baseline confusion.  Latest CVA in February, 2022.  Bubble study negative.  Ultrasound carotid with less than 50% stenosis bilaterally.  No atrial fibrillation.  Patient was then discharged on Eliquis, Plavix, statin.  Aspirin was discontinued.  Patient presented back to the ED with new onset aphasia-expressive.  Patient was afebrile, hypertensive in the ED.  EKG-NSR.  CT head was negative.  Did confirm the old infarcts.  Patient was transferred to our hospital for Neurology evaluation.  Stroke protocol initiated.  Workup ordered.  Neurology consulted.  MRI negative for CVA.  Symptoms most likely secondary to dehydration, on IV hydration.  Family requesting skilled nursing facility placement.  Home meds resumed as appropriate.  Neurology recommended to continue Eliquis, Plavix.  Patient was agitated on 10/09, gave her a dose of Haldol following which she had worsening mentation with lethargic, drowsiness and hypotension, hypotension improved with IV fluids.  Holding all mood altering drugs.  Placed on IV fluids and kept NPO.  CT head, ABG non impressive.  Chest x-ray consistent with aspiration pneumonia, initiated appropriate antibiotics-Cipro and Flagyl.  Patient has allergy to penicillin.  Patient still lethargic and drowsy on 10/10.  Keeping NPO, on IV fluids.  Mentation much better on 10/11.  Speech cleared patient for dysphagia diet.      Interval Hx:   Patient is resting comfortably with no acute issues reported.  She is unable to provide any useful information and there are no family members present at this time.  Patient is afebrile, on room air, and hemodynamically stable.    Objective/physical exam:  General:   Cachectic chronically ill-appearing  female in no acute distress  HENT: normocephalic, atraumatic  Eye: PERRL, EOMI, clear conjunctiva  Neck: full ROM, no thyromegaly  Respiratory:  Diminished breath sounds bilaterally  Cardiovascular: regular rate and rhythm  Gastrointestinal: non-distended, positive bowel sounds, non-tender  Musculoskeletal:  Right above-the-knee amputation  Integumentary: warm, dry, intact, no rashes  Neurological: cranial nerves grossly intact, left facial droop, left hemiparesis  Psychiatric:  Demented with flat affect    VITAL SIGNS: 24 HRS MIN & MAX LAST   Temp  Min: 97.8 °F (36.6 °C)  Max: 98.9 °F (37.2 °C) 98.9 °F (37.2 °C)   BP  Min: 120/68  Max: 177/67 120/68   Pulse  Min: 81  Max: 101  98   Resp  Min: 15  Max: 16 16   SpO2  Min: 95 %  Max: 98 % 95 %       Recent Labs   Lab 10/13/22  0835 10/14/22  0356   WBC 8.8 8.2   RBC 3.37* 3.36*   HGB 10.3* 10.2*   HCT 31.4* 31.4*   MCV 93.2 93.5   MCH 30.6 30.4   MCHC 32.8* 32.5*   RDW 14.5 14.6    159   MPV 10.6* 10.3       Recent Labs   Lab 10/14/22  0356 10/15/22  0545 10/16/22  1927    140 141   K 2.9* 3.2* 3.1*   CO2 19* 20* 20*   BUN 6.6* 5.4* 4.5*   CREATININE 0.84 0.77 0.73   CALCIUM 8.4 8.2* 8.7   MG  --   --  1.70          Microbiology Results (last 7 days)       Procedure Component Value Units Date/Time    Blood Culture [[id number]]  (Normal) Collected: 10/10/22 0730    Order Status: Completed Specimen: Blood Updated: 10/15/22 1334     CULTURE, BLOOD ([facility]) Final Report: At 5 days. No growth    Blood Culture [[id number]]  (Normal) Collected: 10/10/22 0730    Order Status: Completed Specimen: Blood Updated: 10/15/22 1334     CULTURE, BLOOD ([facility]) Final Report: At 5 days. No growth             See below for Radiology    Scheduled Med:   atorvastatin  40 mg Oral Daily    cloNIDine 0.1 mg/24 hr td ptwk  1 patch Transdermal Q7 Days    diltiaZEM  120 mg Oral Daily    famotidine (PF)  20 mg Intravenous Daily     hydrALAZINE  75 mg Oral Q8H    isosorbide mononitrate  120 mg Oral Daily    levetiracetam (KEPPRA) IVPB 100ml  250 mg Intravenous Q12H    lisinopriL  20 mg Oral Daily    metoprolol succinate  50 mg Oral Daily    montelukast  10 mg Oral Daily    nicotine  1 patch Transdermal Daily        Continuous Infusions:  None.    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  Aspiration pneumonitis   Oropharyngeal dysphagia  Vascular dementia with behavioral disturbance   Essential hypertension  Peripheral arterial disease status post right femoral-popliteal bypass, right above-the-knee amputation, and left iliac stenting  Coronary artery disease   Seizure disorder  Chronic obstructive pulmonary disease   Bilateral carotid artery stenosis  Anemia of chronic disease  Tobacco abuse      Plan:   Continue physical therapy and occupational therapy  Neurology recommended a CT of the head tomorrow to assess hemorrhagic changes prior to resuming full anticoagulation   Patient completed Cipro and Flagyl for aspiration pneumonitis   Resume appropriate home medications    PS:  Nursing staff contacted me this afternoon to report the patient is refusing to eat, drink, or take medications with resulting hypoglycemia.  It is my opinion if we discharge this patient to a skilled nursing facility without hospice she will likely be back within days therefore I will request assistance from palliative medicine.  In the meantime will  start Clinimix.    VTE prophylaxis:  SCDs    Patient condition:  Stable but very poor long-term prognosis.  Patient is hospice appropriate in my opinion.    Anticipated discharge and Disposition:     Await skilled nursing facility placement    All diagnosis and differential diagnosis have been reviewed; assessment and plan has been documented; I have personally reviewed the labs and test results that are presently available; I have reviewed the patients medication list; I have reviewed the consulting providers response and recommendations. I have reviewed or attempted to review medical records based upon their availability    All of the patient's questions have been  addressed and answered. Patient's is agreeable to the above stated plan. I will continue to monitor closely and make adjustments to medical management as needed.  _____________________________________________________________________    Nutrition Status:    Radiology:  CV Ultrasound Bilateral Doppler Carotid  Difficult study due to patient being uncooperative/combative. Attempted   twice to get better images. May be best to compare with prior study done   on 10/7/22.    The right internal carotid artery demonstrated elevated velocities that   indicate severe 70-99% stenosis.  The left internal carotid artery demonstrated elevated velocities that   indicate 50-69% stenosis.    The left vertebral artery is patent with antegrade flow.    Unable to visualize the right vertebral artery.  CT Head Without Contrast  EXAMINATION  CT HEAD WITHOUT CONTRAST    CLINICAL HISTORY  Stroke, follow up;re evaluate left occipital petechial hemorrhage seen on MRI;    TECHNIQUE  Axial non-contrast CT images of the head were acquired and multiplanar reconstructions accomplished by a CT technologist at a separate workstation, pushed to PACS for physician review.    COMPARISON  13 October 2022    FINDINGS  Images were reviewed in subdural, brain, soft tissue, and bone  windows.    Exam quality: Motion/streak artifact limits assessment of the posterior fossa.    There are similar areas of widespread hypo density at the bilateral supratentorial parenchyma consistent with multifocal large territory ischemic infarct.  No new hyperdensity is appreciated to suggest hemorrhagic conversion.  No new focal intracranial abnormality, localized mass effect, or midline shift is identified in comparison.  There is no hydrocephalus.    Visualized osseous structures and extracranial soft tissues are unchanged.    IMPRESSION  1. Redemonstrated multiple bilateral supratentorial ischemic infarcts.  2. No new or worsening abnormality.    RADIATION DOSE  Automated tube current modulation, weight-based exposure dosing, and/or iterative reconstruction technique utilized to reach lowest reasonably achievable exposure rate.    DLP: 2014 mGy*cm    Electronically signed by: Connor Damico  Date:    10/15/2022  Time:    08:13      Brennon Naik MD   10/19/2022

## 2022-10-19 NOTE — PLAN OF CARE
Problem: Adult Inpatient Plan of Care  Goal: Plan of Care Review  Outcome: Ongoing, Progressing  Goal: Patient-Specific Goal (Individualized)  Outcome: Ongoing, Progressing  Goal: Absence of Hospital-Acquired Illness or Injury  Outcome: Ongoing, Progressing  Goal: Optimal Comfort and Wellbeing  Outcome: Ongoing, Progressing  Goal: Readiness for Transition of Care  Outcome: Ongoing, Progressing     Problem: Cerebral Tissue Perfusion (Stroke, Ischemic/Transient Ischemic Attack)  Goal: Optimal Cerebral Tissue Perfusion  Outcome: Ongoing, Progressing     Problem: Communication Impairment (Stroke, Ischemic/Transient Ischemic Attack)  Goal: Improved Communication Skills  Outcome: Ongoing, Progressing     Problem: Functional Ability Impaired (Stroke, Ischemic/Transient Ischemic Attack)  Goal: Optimal Functional Ability  Outcome: Ongoing, Progressing     Problem: Sensorimotor Impairment (Stroke, Ischemic/Transient Ischemic Attack)  Goal: Improved Sensorimotor Function  Outcome: Ongoing, Progressing     Problem: Swallowing Impairment (Stroke, Ischemic/Transient Ischemic Attack)  Goal: Optimal Eating and Swallowing without Aspiration  Outcome: Ongoing, Progressing     Problem: Urinary Elimination Impaired (Stroke, Ischemic/Transient Ischemic Attack)  Goal: Effective Urinary Elimination  Outcome: Ongoing, Progressing     Problem: Infection  Goal: Absence of Infection Signs and Symptoms  Outcome: Ongoing, Progressing     Problem: Skin Injury Risk Increased  Goal: Skin Health and Integrity  Outcome: Ongoing, Progressing

## 2022-10-19 NOTE — PT/OT/SLP PROGRESS
"Physical Therapy Treatment    Patient Name:  Daina Kinsey   MRN:  [id number]    Recommendations:     Discharge Recommendations:  nursing facility, skilled (versus home with 24 hr care.)   Discharge Equipment Recommendations: wheelchair   Barriers to discharge:       Assessment:     Daina Kinsey is a 82 y.o. female admitted with a medical diagnosis of Stroke.  She presents with the following impairments/functional limitations:  weakness, gait instability, impaired endurance, impaired balance, impaired self care skills, impaired functional mobility, decreased upper extremity function, decreased lower extremity function, decreased safety awareness, impaired cognition .    Rehab Prognosis: Good; patient would benefit from acute skilled PT services to address these deficits and reach maximum level of function.    Recent Surgery: * No surgery found *      Plan:     During this hospitalization, patient to be seen 3 x/week to address the identified rehab impairments via therapeutic activities, therapeutic exercises and progress toward the following goals:    Plan of Care Expires:  11/08/22    Subjective     Chief Complaint: "I'm cold, cover me up please."  Patient/Family Comments/goals:   Pain/Comfort:         Objective:     Communicated with NSG prior to session.  Patient found up in chair with peripheral IV and telemetry upon PTA entry to room.     General Precautions: Standard, fall   Orthopedic Precautions:N/A   Braces: N/A  Respiratory Status:     BP: 120 / 68 HR 98     Functional Mobility:  Bed: TotalA supine <-> sit  and scoot to EOB, MaxA roll L and R for pericare.   Static sitting balance: ModA to Lina for sitting balance with  Vcs to redirect pt to task. Pt perseverates on wanting to be covered, c/o feeling cold throughout session. Pt needed lots of encouragement to participate.     Patient left HOB elevated with all lines intact, call button in reach, and son present..    GOALS:   Multidisciplinary Problems "       Physical Therapy Goals          Problem: Physical Therapy    Goal Priority Disciplines Outcome Goal Variances Interventions   Physical Therapy Goal     PT, PT/OT Ongoing, Progressing     Description: Goals to be met by: 2022     Patient will increase functional independence with mobility by performin. Supine to sit with Modified [place]  2. Sit to supine with Modified [place]  3. Sit to stand transfer with Contact Guard Assistance  4. Bed to chair transfer with Minimal Assistance using No Assistive Device  5. Wheelchair propulsion x500 feet with Stand-by Assistance using bilateral uppper extremities                         Time Tracking:     PT Received On:    PT Start Time: 1116     PT Stop Time: 1139  PT Total Time (min): 23 min     Billable Minutes: Therapeutic Activity 23    Treatment Type: Treatment  PT/PTA: PTA     PTA Visit Number: 4     10/19/2022

## 2022-10-19 NOTE — PT/OT/SLP PROGRESS
Occupational Therapy   Treatment    Name: Daina Kinsey  MRN: [id number]  Admitting Diagnosis:  Stroke       Recommendations:     Discharge Recommendations: nursing facility, skilled versus 24 hour care at home  Discharge Equipment Recommendations:  none  Barriers to discharge:  None    Assessment:     Daina Kinsey is a 82 y.o. female with a medical diagnosis of Stroke.  She presents with poor participation in treatment session.  Treatment session limited due to patient's decreased participation. Pt yelling at OT/PTA to be laid back down right after sitting up despite encouragement and education on purpose of therapy. Performance deficits affecting function are weakness, gait instability, impaired endurance, impaired balance, impaired functional mobility, impaired self care skills, impaired cognition, decreased safety awareness, decreased lower extremity function, decreased upper extremity function, impaired muscle length, decreased coordination, abnormal tone.     Rehab Prognosis:  Fair; patient would benefit from acute skilled OT services to address these deficits and reach maximum level of function.       Plan:     Patient to be seen 3 x/week, 4 x/week to address the above listed problems via self-care/home management, therapeutic activities, therapeutic exercises, neuromuscular re-education  Plan of Care Expires: 11/02/22  Plan of Care Reviewed with: patient, son    Subjective     Pain/Comfort:  Pain Rating 1: 0/10    Objective:     Communicated with: nrsg prior to session.  Patient found HOB elevated with pulse ox (continuous), telemetry upon OT entry to room.    General Precautions: Standard, fall (SBP <140)   Orthopedic Precautions:N/A   Braces: N/A  Respiratory Status: Room air  BP: 120/68     Occupational Performance:     Bed Mobility:    Patient completed Rolling/Turning to Left with  total assistance  Patient completed Rolling/Turning to Right with total assistance  Patient completed  Scooting/Bridging with total assistance  Patient completed Supine to Sit with total assistance  Patient completed Sit to Supine with total assistance   Sitting balance: Min a-mod A without use of UE for support.    Functional Mobility/Transfers:  Pt not willing to complete any transfers.     Activities of Daily Living:  Feeding:  stand by assistance sitting EOB to take a sip of Ensure.   Upper Body Dressing: maximal assistance to doff and kassandra gown in bed.   Lower Body Dressing: total assistance to doff/kassandra sock.  Toileting: total assistance for bed level perineal care and brief change following void. Clean purewick placed.       Treatment & Education:  See above for completed activities. Treatment session limited due to patient's decreased participation. Pt yelling at OT/PTA to be laid back down right after sitting up despite encouragement and education on purpose of therapy.    Patient left HOB elevated with all lines intact, call button in reach, and son present    GOALS:   Multidisciplinary Problems       Occupational Therapy Goals          Problem: Occupational Therapy    Goal Priority Disciplines Outcome Interventions   Occupational Therapy Goal     OT, PT/OT Ongoing, Progressing    Description: Goals to be met by: 10/25/2022     Patient will increase functional independence with ADLs by performing:    Feeding with Set-up Assistance.  UE Dressing with Moderate Assistance.  LE Dressing with Moderate Assistance.  Grooming while seated at sink with Minimal Assistance.  Toileting from bed level with Maximum Assistance for hygiene and clothing management.                          Time Tracking:     OT Date of Treatment: 10/19/22  OT Start Time: 1116  OT Stop Time: 1139  OT Total Time (min): 23 min    Billable Minutes:Self Care/Home Management 23 min    OT/XANDER: OT     XANDER Visit Number: 1    10/19/2022

## 2022-10-19 NOTE — CONSULTS
Inpatient consult to Palliative Care  Consult performed by: Sal Combs MD  Consult ordered by: Brennon Naik MD    Patient Name: Daina Kinsey   MRN: [id number]   Admission Date: 10/7/2022   Hospital Length of Stay: 12   Attending Provider: Lluvia Alberto MD   Consulting Provider: Sal Combs M.D.  Reason for Consult: Goals of Care  Primary Care Physician: Aric Machado MD     Principal Problem: Stroke     Patient information was obtained from relative(s) and ER records.      Final diagnoses:  [I63.9] Stroke  [R29.90] Stroke-like symptoms (Primary)  [R47.01] Aphasia     Assessment/Plan:     I reviewed the patient's current clinical status with the nurse and physician. I reviewed clinical documentation, labs and imaging. I met with the patient who was able to answer some simple questions. However, her dementia is obvious. She asked me to talk with her son on the cough. There was no one there. She was alert to self only. She stated that she was in [place]. She could name some of her children and stated that she had 8 children.    According to nurses, some of her children were banned from visitations due to aggressive behavior toward nurses. I asked why and nurses stated that it was because they felt that the patient shouldn't have to be stuck with needles to establish iv access. Nurses stated that a son, Tucker, who is a , would be coming tomorrow and may be able to help with decision-making    I contacted her daughter, Leeanne by phone. She does not know if anyone has POA over the patients health care. I asked if it would be possible to have a family meeting to review her mother's medical situation and goals of care. She said that she would like that but is not sure if it can be done as they live in different places. I noted that if half of her siblings could meet, the rest might be able to meet by phone or video conference. She will try to establish a good time in the next couple of  "days.     I spoke with her about her mother's case including new and old CVAs, aspiration pneumonia and dementia. I asked about her functional baseline at home. She said, "well its obvious that she is paralyzed  on her left side from the stroke but I don't know if anything is worse as I haven't seen her lately, I'm not allowed to visit because of some misunderstanding. She also only has 1 leg." I asked if she required ATC care at home. She didn't know but said that 2 family members live with her.  I explained about current concerns in that the patient has not eaten her meals since cleared by speech therapy and has also refused to take her meds. She said that at home they convince her to take her meds and give her some amounts of meds at a time. I explained that I will encourage nurses to be patient with med administration, but that the patient has been short on patience with nurses and myself and refuses to allow very long visits. I explained that we understand that this is likely due to her dementia.  I explained that we can discuss such things further with a consensus of her family present. She said that she agrees that she wants everyone to be on the same page.    Symptom management review: no complaints by the patient or staff    Code status: Full Code at present. Any changes will require all representative children without a POA or surrogate    Disposition: Family is in favor of skilled rehab. I reviewed with Leeanne the difficulty in achieving this if the patient does not eat or take her medications. It is expected that she will not work consistently with therapy and may be too weak to participate in any case. I will communicate this further to other family and offer the option for discharge home with either home health or hospice care if accepted.      History of Present Illness:     82 year old female with history of recurrent CVA. She is now admitted with another CVA in PCA and MCA and Left frontal lobe. She " also has had aspiration pna requiring antibiotics. She has baseline dementia and has had agitation during admit. I was consulted to review goals of care with family.      Active Ambulatory Problems     Diagnosis Date Noted    TIA (transient ischemic attack) 01/29/2022     Resolved Ambulatory Problems     Diagnosis Date Noted    No Resolved Ambulatory Problems     Past Medical History:   Diagnosis Date    Arthritis     Atherosclerosis     Cataracts, bilateral     COPD (chronic obstructive pulmonary disease)     Gallstones     HTN (hypertension)     PAD (peripheral artery disease)     Seizure     Stomach ulcer     Stroke     Vitamin D deficiency         Past Surgical History:   Procedure Laterality Date    CREATION OF FEMORAL-TIBIAL ARTERY BYPASS      INFERIOR VENA CAVA ANGIOPLASTY / STENTING      LEG AMPUTATION THROUGH KNEE      right    REPAIR OF CAROTID ARTERY      TOE AMPUTATION      left great toe        Review of patient's allergies indicates:   Allergen Reactions    Cephalexin Hives and Itching    Penicillins           Current Facility-Administered Medications:     acetaminophen tablet 1,000 mg, 1,000 mg, Oral, Q6H PRN, Samaria Bermeo MD, 1,000 mg at 10/08/22 2330    acetaminophen tablet 650 mg, 650 mg, Oral, Q4H PRN, Samaria Bermeo MD, 650 mg at 10/12/22 1532    aluminum-magnesium hydroxide-simethicone 200-200-20 mg/5 mL suspension 30 mL, 30 mL, Oral, QID PRN, Samaria Bermeo MD    amino acid 4.25% - dextrose 5% solution, , Intravenous, Continuous, Brennon Naik MD, Last Rate: 60 mL/hr at 10/19/22 1615, New Bag at 10/19/22 1615    atorvastatin tablet 40 mg, 40 mg, Oral, Daily, Lluvia Alberto MD, 40 mg at 10/18/22 0825    cloNIDine 0.1 mg/24 hr td ptwk 1 patch, 1 patch, Transdermal, Q7 Days, Summer Ga MD, 1 patch at 10/13/22 2036    dextrose 10% bolus 125 mL, 12.5 g, Intravenous, PRN, RHODA Smith, Stopped at 10/18/22 1640    dextrose 10% bolus 250 mL, 25 g, Intravenous, PRN, RHODA Smith     diltiaZEM 24 hr capsule 120 mg, 120 mg, Oral, Daily, Lluvia Alberto MD, 120 mg at 10/18/22 0826    famotidine (PF) injection 20 mg, 20 mg, Intravenous, Daily, Samaria Bermeo MD, 20 mg at 10/19/22 0951    glucagon (human recombinant) injection 1 mg, 1 mg, Intramuscular, PRN, [address], [facility]    hydrALAZINE injection 10 mg, 10 mg, Intravenous, Q4H PRN, Samaria Bermeo MD, 10 mg at 10/19/22 0544    hydrALAZINE tablet 75 mg, 75 mg, Oral, Q8H, Alfie Santiago MD, 75 mg at 10/18/22 0532    insulin aspart U-100 injection 0-5 Units, 0-5 Units, Subcutaneous, Q6H PRN, [address], [facility]    isosorbide mononitrate 24 hr tablet 120 mg, 120 mg, Oral, Daily, Lluvia Alberto MD, 120 mg at 10/18/22 0825    labetaloL injection 10 mg, 10 mg, Intravenous, Q4H PRN, Samaria Bermeo MD, 10 mg at 10/14/22 2142    labetaloL injection 5 mg, 5 mg, Intravenous, Q2H PRN, Lluvia Alberto MD, 5 mg at 10/15/22 0017    levETIRAcetam tablet 500 mg, 500 mg, Oral, BID, Brennon Naik MD    lisinopriL tablet 20 mg, 20 mg, Oral, Daily, Lluvia Alberto MD, 20 mg at 10/18/22 0825    melatonin tablet 6 mg, 6 mg, Oral, Nightly PRN, Samaria Bermeo MD, 6 mg at 10/08/22 2023    metoprolol succinate (TOPROL-XL) 24 hr tablet 50 mg, 50 mg, Oral, Daily, Lluvia Alberto MD, 50 mg at 10/18/22 0825    montelukast tablet 10 mg, 10 mg, Oral, Daily, Lluvia Alberto MD, 10 mg at 10/18/22 0826    nicotine 21 mg/24 hr 1 patch, 1 patch, Transdermal, Daily, Lluvia Alberto MD, 1 patch at 10/19/22 0952    ondansetron injection 4 mg, 4 mg, Intravenous, Q4H PRN, Samaria Bermeo MD, 4 mg at 10/09/22 2145    polyethylene glycol packet 17 g, 17 g, Oral, BID PRN, Samaria Bermeo MD    prochlorperazine injection Soln 5 mg, 5 mg, Intravenous, Q6H PRN, Samaria Bermeo MD    senna-docusate 8.6-50 mg per tablet 1 tablet, 1 tablet, Oral, BID PRN, Samaria Bermeo MD    simethicone chewable tablet 80 mg, 1 tablet, Oral, QID PRN, Samaria Bermeo MD    sodium chloride 0.9% flush 10 mL, 10 mL, Intravenous, PRN,  "Brennon Frias, DO    sodium chloride 0.9% flush 10 mL, 10 mL, Intravenous, PRN, Summer Ga MD     acetaminophen, acetaminophen, aluminum-magnesium hydroxide-simethicone, dextrose 10%, dextrose 10%, glucagon (human recombinant), hydrALAZINE, insulin aspart U-100, labetalol, labetaloL, melatonin, ondansetron, polyethylene glycol, prochlorperazine, senna-docusate 8.6-50 mg, simethicone, sodium chloride 0.9%, sodium chloride 0.9%     History reviewed. No pertinent family history.     Review of Systems   Unable to perform ROS: Dementia          Objective:   /71   Pulse 88   Temp 98.1 °F (36.7 °C) (Oral)   Resp 18   Ht 5' 0.98" (1.549 m)   Wt 47.6 kg (104 lb 15 oz)   SpO2 98%   Breastfeeding No   BMI 19.84 kg/m²      Physical Exam  Vitals reviewed.   Constitutional:       General: She is not in acute distress.     Appearance: She is not ill-appearing or toxic-appearing.   HENT:      Head: Normocephalic.      Right Ear: External ear normal.      Left Ear: External ear normal.      Nose: Nose normal.      Mouth/Throat:      Mouth: Mucous membranes are moist.      Pharynx: Oropharynx is clear.   Eyes:      Conjunctiva/sclera: Conjunctivae normal.      Pupils: Pupils are equal, round, and reactive to light.   Cardiovascular:      Rate and Rhythm: Normal rate and regular rhythm.      Pulses: Normal pulses.      Heart sounds: Normal heart sounds.   Pulmonary:      Effort: Pulmonary effort is normal.      Breath sounds: Normal breath sounds.   Abdominal:      General: Abdomen is flat. Bowel sounds are normal. There is no distension.      Tenderness: There is no abdominal tenderness.   Skin:     General: Skin is warm.   Neurological:      Mental Status: She is alert. She is disoriented.      Cranial Nerves: Cranial nerve deficit present.      Motor: Weakness present.      Comments: Left hemiparesis   Psychiatric:         Mood and Affect: Mood normal.          Review of Symptoms  Review of " Symptoms      Symptom Assessment (ESAS 0-10 Scale)  Unable to complete assessment due to Dementia     CAM / Delirium:  Negative  Constipation:  Negative  Diarrhea:  Negative      Bowel Management Plan (BMP):  Yes      Pain Assessment:  OME in 24 hours:  0  Location(s):      Pain Assessment in Advanced Demential Scale (PAINAD)   Breathing - Independent of vocalization:  0  Negative vocalization:  0  Facial expression:  0  Body language:  0  Consolability:  0  Total:  0    Modified Laura Scale:  0    Performance Status:  30    Living Arrangements:  Lives in home and Lives with family    Spiritual:  F - Makayla and Belief:  [employer]     Time-Based Charting:  Yes    Total Time Spent: 0 minutes    Advance Care Planning   Advance Directives:   Living Will: No    LaPOST: No    Do Not Resuscitate Status: No    Medical Power of : No    Agent's Name:  Mague Leeanne Sherman   Agent's Contact Number:  [phone]    Decision Making:  Family answered questions and Patient unable to communicate due to disease severity/cognitive impairment          Caregiver burden formerly assessed: yes        > 50% of 50 min of encounter was spent in chart review, face to face discussion of goals of care, symptom assessment, coordination of care and emotional support.     Sal Combs M.D.  Palliative Medicine  [facility]

## 2022-10-20 NOTE — PROGRESS NOTES
Inpatient Nutrition Assessment    Admit Date: 10/7/2022   Total duration of encounter: 13 days     Nutrition Recommendation/Prescription     - Continue oral diet as per SLP  - If pt continues to refuse oral intake, recommend Peg placement and begin tube feeds. Recommend Isosource HN @ goal rate 55mL/hr to meet 100% est nutritional needs.     -For now continue PPN @ 60 ml/hr.  Provides 490 kcals (37% est needs), 61 g protein (100% est needs), 1306 ml fluid (100% est needs)     Communication of Recommendations: reviewed with nurse    Nutrition Assessment     Malnutrition Assessment/Nutrition-Focused Physical Exam    Malnutrition in the context of acute illness or injury  Degree of Malnutrition: non-severe (moderate) malnutrition  Energy Intake: < 75% of estimated energy requirement for > 7 days  Interpretation of Weight Loss: does not meet criteria  Body Fat:mild depletion  Area of Body Fat Loss: orbital region  and upper arm region - triceps / biceps  Muscle Mass Loss: moderate depletion  Area of Muscle Mass Loss: clavicle bone region - pectoralis major, deltoid, trapezius muscles, dorsal hand - interosseous musle, and patellar region - quadricep muscle  Fluid Accumulation: does not meet criteria  Edema: not applicable   Reduced  Strength: unable to obtain  A minimum of two characteristics is recommended for diagnosis of either severe or non-severe malnutrition.    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, dysarthria, and left facial droop  Bilateral carotid atherosclerotic disease  Probable vascular dementia  Hypertension      Relevant Medical History:   Multiple CVAs-  left-sided weakness, left facial droop, dysarthria, and confusion  Bilateral LONG  PAD - right fem-tib bypass, right AKA, left iliac stent  CAD-obstructive per [facility] 2013  Probable vascular  "dementia  Seizure  HTN  HLD  COPD  Osteoarthritis    Nutrition-Related Medications: Atorvastatin, Hydralazine, Lisinopril, Metoprolol, Nicotine, Insulin PRN PPN    Calorie Containing IV Medications: Clinimix (details below)     Nutrition-Related Labs:  10/8: Cl 110, GFR >60  10/13: H/H-10.3/31.4, K-2.7, Bun-9.2  10/16: K-3.1, Bun-4.5, Gluc-79  10/20: Glu 119, K 3.2     Diet/PN Order: Diet Pureed Mildly Thick Liquids; Supervision with Meals  Oral Supplement Order: Boost VHC  Tube Feeding Order: none at this time  Appetite/Oral Intake: poor/0-25% of meals  Factors Affecting Nutritional Intake: chewing difficulty, difficulty/impaired swallowing, and edentulous  Food/Presybeterian/Cultural Preferences: none reported    Wound(s): none noted    Comments    10/8: Seen for unsure wt loss. Pt in bed with daughter at bedside. Noted SLP evaluated this am, rec'd minced and moist diet with mildly thick liquids. Pt has not attempted PO intake yet, will monitor tolerance. Per daughter, pt eats fruit, eggs, grits, red beans and rice at home - usually with good intake except for last week or so. Offered ONS VHC- pt receptive. No significant wt loss noted.    10/13: Pt only mumbling during visit. Son at bedside. Pt now NPO. SLP re-consulted. Pt has been refusing medications. Some confusion noted as well. Pt may benefit from Peg placement if unable to resume oral diet due to mental status.     10/17: Pt refusing oral intake. Nsg stated had a hard time getting pt to take oral medications. Tongue is also difficult to move around for intake. If pt continues to refuse, may benefit from Peg placement and begin Tube feeds for primary source of nutrition.     10/20: Pt refusing most food and drink, has been started on PPN. Palliative discussions underway.      Anthropometrics    Height: 5' 0.98" (154.9 cm) Height Method: Stated  Last Weight: 47.6 kg (104 lb 15 oz) (10/08/22 1225) Weight Method: Bed Scale  BMI (Calculated): 19.8-Not appropriate " due to Rt BKA  BMI Classification: normal (BMI 18.5-24.9)     Ideal Body Weight (IBW), Female: 104.9 lb     % Ideal Body Weight, Female (lb): 100.04 %        Usual Body Weight (UBW), k kg (98-105lb)  % Usual Body Weight: 103.69  % Weight Change From Usual Weight: 3.48 %  Usual Weight Provided By: family/caregiver    Wt Readings from Last 5 Encounters:   10/08/22 47.6 kg (104 lb 15 oz)   22 47.6 kg (105 lb)   22 45.4 kg (100 lb)   22 46.3 kg (102 lb)   19 46.6 kg (102 lb 11.8 oz)     Weight Change(s) Since Admission:   Admit Weight: 47.6 kg (105 lb) (10/07/22 1421)  10/13: 47.6kg  10/17: 47.6kg    Estimated Needs    Weight Used For Calorie Calculations: 47.6 kg (104 lb 15 oz)  Energy Calorie Requirements (kcal): 1316kcals (28kcal/kg)  Energy Need Method: Kcal/kg  Weight Used For Protein Calculations: 47.6 kg (104 lb 15 oz)  Protein Requirements: 57gm/kg (1.2gm/kg)  Fluid Requirements (mL): 1300mL  Temp: 97.7 °F (36.5 °C)       Enteral Nutrition    Patient not receiving enteral nutrition at this time.    Parenteral Nutrition    Standard Formula: Clinimix E 4.25/5  Custom Formula: not applicable  Additives: none  Rate/Volume: 60 ml/hr  Lipids: none  Total Nutrition Provided by Parenteral Nutrition:  Calories Provided  490 kcal/d, 37% needs   Protein Provided  61 g/d, 100% needs   Dextrose Provided  72 g/d   Fluid Provided  1306 ml/d, 100% needs       Evaluation of Received Nutrient Intake    Calories: not meeting estimated needs  Protein: meeting estimated needs    Patient Education    Not applicable.    Nutrition Diagnosis     PES: Malnutrition related to stroke as evidenced by <75% estimated energy needs, physical evidence of mild muscle/fat wasting. (continues)    Interventions/Goals     Intervention(s): modified composition of meals/snacks, modified composition of enteral nutrition, commercial beverage, and collaboration with other providers  Goal: Meet greater than 75% of nutritional  needs by follow-up. (goal not met)    Monitoring & Evaluation     Dietitian will monitor food and beverage intake, enteral nutrition intake, and weight change.  Nutrition Risk/Follow-Up: high (follow-up in 1-4 days)

## 2022-10-20 NOTE — CONSULTS
"Consults    Patient Name: Daina Kinsey   MRN: [id number]   Admission Date: 10/7/2022   Hospital Length of Stay: 13   Attending Provider: Lluvia Alberto MD   Consulting Provider: Harper DUONG  Reason for Consult: Goals of Care  Primary Care Physician:  Aric Machado MD     Principal Problem: Stroke       Final diagnoses:  [I63.9] Stroke  [R29.90] Stroke-like symptoms (Primary)  [R47.01] Aphasia      Assessment/Plan:     I reviewed the patient and family's understanding of the seriousness of the illness and its expected prognosis. We discussed the patient's goals of care and treatment preferences.        Patient lying in bed with son, cousin, and cousin's daughter present.  Discussed current condition.  Son informed that they have large family and that not all family members were aware of her declining condition.  Discussed that he is aware that dementia results in progressive debility.  Discussed her swallowing difficulties--son states that patient does not like thickener and "picks at her food."  Discussed that patients often dislike thickened consistency or pureed food.  Discussed that with dementia patients, they do not understand the necessity.     Cousin asking about feeding tube--discussed that is a family choice and may be discussed if patient cannot eat or stops eating.  Discussed risks and benefits. Son also expressed that patient is more confused in the hospital--explained that this is typical behavior for elderly or demented.  Discussed that hospitalizations may become more burdensome than beneficial as a patient's condition advances.  They verbalized understanding.      Discussed case with CM who informed that she is awaiting a call from daughter concerning SNF placement.  I called son Tucker at [phone] on the phone concerning family meeting.  He expressed frustration because his sister who is a caregiver has a daughter who has been in an accident and his sister Leeanne has not been allowed " back in the hospital.  States that he would like for family to be able to discuss in person with medical team.  Discussed with son that hospital main responsibility is taking care of patients and cannot allow any behavior that may hinder that care.  He informed that he feels that his sister needs to be at hospital to discuss with him.      States that he will be at hospital at around 1000 am and can meet with Dr. Combs at that time.  Discussed with son that his mother has advanced dementia and is at risk for further decline, necessitating family discussion about options.  He is requesting that I speak to manager to allow [place] in the hospital.  Discussed that I will speak to management for further guidance.     Spoke to Donna concerning family request and she states that she will speak with manager Tea.  Son requests that I call him back--informed that as soon as I have an answer I will notify him.  Manager then informed that daughter could be present for meeting.      ADDENDUM:   spoke to daughter to inform her that she is allowed to come for meeting only and to arrange time with her brother so that they can meet with Dr. Combs.     Interval History:     Patient is lying in bed with family present.  She is pleasantly confused and oriented to person only. Nurse able to administer medications this morning with Boost.  Clinimix has been initiated.  Patient is hemodynamically stable.  Palliative team attempting to discuss plan with family.        Active Ambulatory Problems     Diagnosis Date Noted    TIA (transient ischemic attack) 01/29/2022     Resolved Ambulatory Problems     Diagnosis Date Noted    No Resolved Ambulatory Problems     Past Medical History:   Diagnosis Date    Arthritis     Atherosclerosis     Cataracts, bilateral     COPD (chronic obstructive pulmonary disease)     Gallstones     HTN (hypertension)     PAD (peripheral artery disease)     Seizure     Stomach ulcer     Stroke      Vitamin D deficiency         Past Surgical History:   Procedure Laterality Date    CREATION OF FEMORAL-TIBIAL ARTERY BYPASS      INFERIOR VENA CAVA ANGIOPLASTY / STENTING      LEG AMPUTATION THROUGH KNEE      right    REPAIR OF CAROTID ARTERY      TOE AMPUTATION      left great toe        Review of patient's allergies indicates:   Allergen Reactions    Cephalexin Hives and Itching    Penicillins           Current Facility-Administered Medications:     acetaminophen tablet 1,000 mg, 1,000 mg, Oral, Q6H PRN, Samaria Bermeo MD, 1,000 mg at 10/08/22 2330    acetaminophen tablet 650 mg, 650 mg, Oral, Q4H PRN, Samaria Bermeo MD, 650 mg at 10/12/22 1532    aluminum-magnesium hydroxide-simethicone 200-200-20 mg/5 mL suspension 30 mL, 30 mL, Oral, QID PRN, Samaria Bermeo MD    amino acid 4.25% - dextrose 5% solution, , Intravenous, Continuous, Brennon Naik MD, Last Rate: 60 mL/hr at 10/19/22 1615, New Bag at 10/19/22 1615    atorvastatin tablet 40 mg, 40 mg, Oral, Daily, Lluvia Alberto MD, 40 mg at 10/20/22 0946    cloNIDine 0.1 mg/24 hr td ptwk 1 patch, 1 patch, Transdermal, Q7 Days, Summer Ga MD, 1 patch at 10/20/22 1010    dextrose 10% bolus 125 mL, 12.5 g, Intravenous, PRN, RHODA Smith, Stopped at 10/18/22 1640    dextrose 10% bolus 250 mL, 25 g, Intravenous, PRN, RHODA Smith    diltiaZEM 24 hr capsule 120 mg, 120 mg, Oral, Daily, Lluvia Alberto MD, 120 mg at 10/20/22 0946    famotidine (PF) injection 20 mg, 20 mg, Intravenous, Daily, Samaria Bermeo MD, 20 mg at 10/19/22 0951    glucagon (human recombinant) injection 1 mg, 1 mg, Intramuscular, PRN, RHODA Smith    hydrALAZINE injection 10 mg, 10 mg, Intravenous, Q4H PRN, Samaria Bermeo MD, 10 mg at 10/19/22 0544    hydrALAZINE tablet 75 mg, 75 mg, Oral, Q8H, Alfie Santiago MD, 75 mg at 10/18/22 0532    insulin aspart U-100 injection 0-5 Units, 0-5 Units, Subcutaneous, Q6H PRN, RHODA Smith    isosorbide mononitrate 24 hr tablet 120 mg, 120  mg, Oral, Daily, Lluvia Alberto MD, 120 mg at 10/20/22 0946    labetaloL injection 10 mg, 10 mg, Intravenous, Q4H PRN, Samaria Bermeo MD, 10 mg at 10/14/22 2142    labetaloL injection 5 mg, 5 mg, Intravenous, Q2H PRN, Lluvia Alberto MD, 5 mg at 10/15/22 0017    levETIRAcetam (KEPPRA) 500 mg in dextrose 5 % in water (D5W) 5 % 100 mL IVPB (MB+), 500 mg, Intravenous, Q12H, Cecilia Street, FNP, Stopped at 10/19/22 2313    lisinopriL tablet 20 mg, 20 mg, Oral, Daily, Lluvia Alberto MD, 20 mg at 10/20/22 0946    melatonin tablet 6 mg, 6 mg, Oral, Nightly PRN, Samaria Bermeo MD, 6 mg at 10/08/22 2023    metoprolol succinate (TOPROL-XL) 24 hr tablet 50 mg, 50 mg, Oral, Daily, Lluvia Alberto MD, 50 mg at 10/20/22 0946    montelukast tablet 10 mg, 10 mg, Oral, Daily, Lluvia Alberto MD, 10 mg at 10/20/22 0945    nicotine 21 mg/24 hr 1 patch, 1 patch, Transdermal, Daily, Lluvia Alberto MD, 1 patch at 10/20/22 1010    ondansetron injection 4 mg, 4 mg, Intravenous, Q4H PRN, Samaria Bermeo MD, 4 mg at 10/09/22 2145    polyethylene glycol packet 17 g, 17 g, Oral, BID PRN, Samaria Bermeo MD    prochlorperazine injection Soln 5 mg, 5 mg, Intravenous, Q6H PRN, Samaria Bermeo MD    senna-docusate 8.6-50 mg per tablet 1 tablet, 1 tablet, Oral, BID PRN, Samaria Bermeo MD    simethicone chewable tablet 80 mg, 1 tablet, Oral, QID PRN, Samaria Bermeo MD    sodium chloride 0.9% flush 10 mL, 10 mL, Intravenous, PRN, Brennon GREENWOOD Rodriguez, DO    sodium chloride 0.9% flush 10 mL, 10 mL, Intravenous, PRN, Summer Ga MD     acetaminophen, acetaminophen, aluminum-magnesium hydroxide-simethicone, dextrose 10%, dextrose 10%, glucagon (human recombinant), hydrALAZINE, insulin aspart U-100, labetalol, labetaloL, melatonin, ondansetron, polyethylene glycol, prochlorperazine, senna-docusate 8.6-50 mg, simethicone, sodium chloride 0.9%, sodium chloride 0.9%     History reviewed. No pertinent family history.       Review of Systems   Unable to perform ROS: Dementia   "        Objective:   BP (!) 151/77   Pulse 89   Temp 98 °F (36.7 °C) (Axillary)   Resp (!) 21   Ht 5' 0.98" (1.549 m)   Wt 47.6 kg (104 lb 15 oz)   SpO2 98%   Breastfeeding No   BMI 19.84 kg/m²      Physical Exam   Constitutional: She appears ill.   HENT:   Head: Normocephalic.   Eyes: Pupils are equal, round, and reactive to light.   Cardiovascular: Normal rate and regular rhythm. Pulmonary:      Effort: Pulmonary effort is normal.     Abdominal: Soft.   Musculoskeletal:      Comments: sarcopenia   Neurological: She is alert.   To person   Skin: Skin is warm and dry.        Review of Symptoms  Review of Symptoms      Symptom Assessment (ESAS 0-10 Scale)  Pain:  0  Dyspnea:  0  Anxiety:  0  Nausea:  0  Depression:  0  Anorexia:  0  Fatigue:  0  Insomnia:  0  Restlessness:  0  Agitation:  0         Bowel Management Plan (BMP):  Yes      Performance Status:  30    Advance Care Planning   Advance Directives:   Goals of Care: The family endorses that what is most important right now is to focus on curative/life-prolongation (regardless of treatment burdens)    Accordingly, we have decided that the best plan to meet the patient's goals includes continuing with treatment        PAINAD: Appears comfortable    Caregiver burden formerly assessed: yes      No results displayed because visit has over 200 results.               > 50% of 35 min of encounter was spent in chart review, face to face discussion of goals of care, symptom assessment, coordination of care and emotional support.    20 minutes spent in ACP discussion    Harper Rollins FNP, [facility]  Palliative Medicine  [facility]   "

## 2022-10-20 NOTE — PLAN OF CARE
Problem: Adult Inpatient Plan of Care  Goal: Plan of Care Review  Outcome: Ongoing, Progressing  Goal: Patient-Specific Goal (Individualized)  Outcome: Ongoing, Progressing  Goal: Absence of Hospital-Acquired Illness or Injury  Outcome: Ongoing, Progressing  Goal: Optimal Comfort and Wellbeing  Outcome: Ongoing, Progressing  Goal: Readiness for Transition of Care  Outcome: Ongoing, Progressing     Problem: Adjustment to Illness (Stroke, Ischemic/Transient Ischemic Attack)  Goal: Optimal Coping  Outcome: Ongoing, Progressing     Problem: Bowel Elimination Impaired (Stroke, Ischemic/Transient Ischemic Attack)  Goal: Effective Bowel Elimination  Outcome: Ongoing, Progressing     Problem: Cerebral Tissue Perfusion (Stroke, Ischemic/Transient Ischemic Attack)  Goal: Optimal Cerebral Tissue Perfusion  Outcome: Ongoing, Progressing     Problem: Cognitive Impairment (Stroke, Ischemic/Transient Ischemic Attack)  Goal: Optimal Cognitive Function  Outcome: Ongoing, Progressing     Problem: Communication Impairment (Stroke, Ischemic/Transient Ischemic Attack)  Goal: Improved Communication Skills  Outcome: Ongoing, Progressing     Problem: Functional Ability Impaired (Stroke, Ischemic/Transient Ischemic Attack)  Goal: Optimal Functional Ability  Outcome: Ongoing, Progressing     Problem: Sensorimotor Impairment (Stroke, Ischemic/Transient Ischemic Attack)  Goal: Improved Sensorimotor Function  Outcome: Ongoing, Progressing     Problem: Swallowing Impairment (Stroke, Ischemic/Transient Ischemic Attack)  Goal: Optimal Eating and Swallowing without Aspiration  Outcome: Ongoing, Progressing     Problem: Urinary Elimination Impaired (Stroke, Ischemic/Transient Ischemic Attack)  Goal: Effective Urinary Elimination  Outcome: Ongoing, Progressing     Problem: Infection  Goal: Absence of Infection Signs and Symptoms  Outcome: Ongoing, Progressing     Problem: Skin Injury Risk Increased  Goal: Skin Health and Integrity  Outcome:  Ongoing, Progressing     Problem: Coping Ineffective  Goal: Effective Coping  Outcome: Ongoing, Progressing

## 2022-10-20 NOTE — PROGRESS NOTES
[facility]  Hospital Medicine Progress Note        Chief Complaint: Inpatient follow-up on stroke    HPI:   Patient is an 82-year-old  female with past medical history of multiple CVAs with residual left-sided weakness, left facial droop, dysarthria and baseline confusion.  Latest CVA in February, 2022.  Bubble study negative.  Ultrasound carotid with less than 50% stenosis bilaterally.  No atrial fibrillation.  Patient was then discharged on Eliquis, Plavix, statin.  Aspirin was discontinued.  Patient presented back to the ED with new onset aphasia-expressive.  Patient was afebrile, hypertensive in the ED.  EKG-NSR.  CT head was negative.  Did confirm the old infarcts.  Patient was transferred to our hospital for Neurology evaluation.  Stroke protocol initiated.  Workup ordered.  Neurology consulted.  MRI negative for CVA.  Symptoms most likely secondary to dehydration, on IV hydration.  Family requesting skilled nursing facility placement.  Home meds resumed as appropriate.  Neurology recommended to continue Eliquis, Plavix.  Patient was agitated on 10/09, gave her a dose of Haldol following which she had worsening mentation with lethargic, drowsiness and hypotension, hypotension improved with IV fluids.  Holding all mood altering drugs.  Placed on IV fluids and kept NPO.  CT head, ABG non impressive.  Chest x-ray consistent with aspiration pneumonia, initiated appropriate antibiotics-Cipro and Flagyl.  Patient has allergy to penicillin.  Patient still lethargic and drowsy on 10/10.  Keeping NPO, on IV fluids.  Mentation much better on 10/11.  Speech cleared patient for dysphagia diet.      Interval Hx:   Patient seen and examined patient's son and other family member bedside she was talking was started on Clinimix yesterday as she was not eating much.  Patient was encouraged to eat more she verbalized understanding     Objective/physical exam:  General:  Cachectic  chronically ill-appearing  female in no acute distress  HENT: normocephalic, atraumatic  Eye: PERRL, EOMI, clear conjunctiva  Neck: full ROM, no thyromegaly  Respiratory:  Diminished breath sounds bilaterally  Cardiovascular: regular rate and rhythm  Gastrointestinal: non-distended, positive bowel sounds, non-tender  Musculoskeletal:  Right above-the-knee amputation  Integumentary: warm, dry, intact, no rashes  Neurological: cranial nerves grossly intact, left facial droop, left hemiparesis  Psychiatric:  Demented with flat affect    VITAL SIGNS: 24 HRS MIN & MAX LAST   Temp  Min: 98.1 °F (36.7 °C)  Max: 98.9 °F (37.2 °C) 98.7 °F (37.1 °C)   BP  Min: 104/56  Max: 197/81 (!) 140/89   Pulse  Min: 88  Max: 98  89   Resp  Min: 18  Max: 18 18   SpO2  Min: 95 %  Max: 98 % 97 %       Recent Labs   Lab 10/13/22  0835 10/14/22  0356   WBC 8.8 8.2   RBC 3.37* 3.36*   HGB 10.3* 10.2*   HCT 31.4* 31.4*   MCV 93.2 93.5   MCH 30.6 30.4   MCHC 32.8* 32.5*   RDW 14.5 14.6    159   MPV 10.6* 10.3       Recent Labs   Lab 10/14/22  0356 10/15/22  0545 10/16/22  1927    140 141   K 2.9* 3.2* 3.1*   CO2 19* 20* 20*   BUN 6.6* 5.4* 4.5*   CREATININE 0.84 0.77 0.73   CALCIUM 8.4 8.2* 8.7   MG  --   --  1.70          Microbiology Results (last 7 days)       Procedure Component Value Units Date/Time    Blood Culture [[id number]]  (Normal) Collected: 10/10/22 0730    Order Status: Completed Specimen: Blood Updated: 10/15/22 1334     CULTURE, BLOOD ([facility]) Final Report: At 5 days. No growth    Blood Culture [[id number]]  (Normal) Collected: 10/10/22 0730    Order Status: Completed Specimen: Blood Updated: 10/15/22 1334     CULTURE, BLOOD ([facility]) Final Report: At 5 days. No growth             See below for Radiology    Scheduled Med:   atorvastatin  40 mg Oral Daily    cloNIDine 0.1 mg/24 hr td ptwk  1 patch Transdermal Q7 Days    diltiaZEM  120 mg Oral Daily    famotidine (PF)  20 mg Intravenous Daily    hydrALAZINE  75  mg Oral Q8H    isosorbide mononitrate  120 mg Oral Daily    levetiracetam IV  500 mg Intravenous Q12H    lisinopriL  20 mg Oral Daily    metoprolol succinate  50 mg Oral Daily    montelukast  10 mg Oral Daily    nicotine  1 patch Transdermal Daily        Continuous Infusions:   amino acid 4.25% - dextrose 5% solution 60 mL/hr at 10/19/22 1615   None.    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  Aspiration pneumonitis   Oropharyngeal dysphagia  Vascular dementia with behavioral disturbance   Essential hypertension  Peripheral arterial disease status post right femoral-popliteal bypass, right above-the-knee amputation, and left iliac stenting  Coronary artery disease   Seizure disorder  Chronic obstructive pulmonary disease   Bilateral carotid artery stenosis  Anemia of chronic disease  Tobacco abuse      Plan:   Repeat CT of the head was done today and that is stable will await neurology's recommendations about resuming Plavix and Eliquis  Continue physical therapy and occupational therapy  Patient completed Cipro and Flagyl for aspiration pneumonitis   Resume appropriate home medications  Continue with Clinimix for now hopefully patient eats more  continue with PT and OT  case management is working on placement    VTE prophylaxis:  SCDs    Patient condition:  Stable but very poor long-term prognosis.      Anticipated discharge and Disposition:     Await skilled nursing facility placement    All  diagnosis and differential diagnosis have been reviewed; assessment and plan has been documented; I have personally reviewed the labs and test results that are presently available; I have reviewed the patients medication list; I have reviewed the consulting providers response and recommendations. I have reviewed or attempted to review medical records based upon their availability    All of the patient's questions have been  addressed and answered. Patient's is agreeable to the above stated plan. I will continue to monitor closely and make adjustments to medical management as needed.  _____________________________________________________________________    Nutrition Status:    Radiology:  CT Head Without Contrast  EXAMINATION  CT HEAD WITHOUT CONTRAST    CLINICAL HISTORY  to determine if Eliquis/Plavix can be resumed;    TECHNIQUE  Axial non-contrast CT images of the head were acquired and multiplanar reconstructions accomplished by a CT technologist at a separate workstation, pushed to PACS for physician review.    COMPARISON  15 October 2022    FINDINGS  Images were reviewed in subdural, brain, soft tissue, and bone windows.    Exam quality: Motion/streak artifact limits assessment of the posterior fossa.    Widespread changes of chronic white matter microvascular disease and the left frontal and right posterior parietal encephalomalacia are similar in the short interval.  No areas of new parenchymal attenuation abnormality are identified.  There is no evidence of interval acute intracranial hemorrhage.  No extra-axial fluid collection or midline shift is identified.  There is no discrete mass.    The visualized extracranial soft tissues and regional osseous structures are unchanged.    IMPRESSION  1. No convincing acute intracranial abnormality.  2. Additional secondary details discussed above, relatively unchanged from the 15 October 2022 appearance.    RADIATION DOSE  Automated tube current modulation,  weight-based exposure dosing, and/or iterative reconstruction technique utilized to reach lowest reasonably achievable exposure rate.    DLP: 3079 mGy*cm    Electronically signed by: Connor Damico  Date:    10/20/2022  Time:    08:01      Summer Ga MD   10/20/2022

## 2022-10-20 NOTE — PLAN OF CARE
10/20/22 1417   Discharge Reassessment   Assessment Type Discharge Planning Reassessment   Discharge Plan discussed with: Adult children   Post-Acute Status   Post-Acute Authorization Placement   Post-Acute Placement Status Referrals Sent  ([facility] [place])

## 2022-10-21 NOTE — PLAN OF CARE
Problem: Adult Inpatient Plan of Care  Goal: Plan of Care Review  10/20/2022 2037 by Kimber Sierra RN  Outcome: Ongoing, Progressing  10/20/2022 2037 by Kimber Sierra RN  Outcome: Ongoing, Progressing  Goal: Patient-Specific Goal (Individualized)  10/20/2022 2037 by Kimber Sierra RN  Outcome: Ongoing, Progressing  10/20/2022 2037 by Kimber Sierra RN  Outcome: Ongoing, Progressing  Goal: Absence of Hospital-Acquired Illness or Injury  10/20/2022 2037 by Kimber Sierra RN  Outcome: Ongoing, Progressing  10/20/2022 2037 by Kimber Sierra RN  Outcome: Ongoing, Progressing  Goal: Optimal Comfort and Wellbeing  10/20/2022 2037 by Kimber Sierra RN  Outcome: Ongoing, Progressing  10/20/2022 2037 by Kimber Sierra RN  Outcome: Ongoing, Progressing  Goal: Readiness for Transition of Care  10/20/2022 2037 by Kimber Sierra RN  Outcome: Ongoing, Progressing  10/20/2022 2037 by Kimber Sierra RN  Outcome: Ongoing, Progressing     Problem: Adjustment to Illness (Stroke, Ischemic/Transient Ischemic Attack)  Goal: Optimal Coping  10/20/2022 2037 by Kimber Sierra RN  Outcome: Ongoing, Progressing  10/20/2022 2037 by Kimber Sierra RN  Outcome: Ongoing, Progressing     Problem: Bowel Elimination Impaired (Stroke, Ischemic/Transient Ischemic Attack)  Goal: Effective Bowel Elimination  10/20/2022 2037 by Kimber Sierra RN  Outcome: Ongoing, Progressing  10/20/2022 2037 by Kimber Sierra RN  Outcome: Ongoing, Progressing     Problem: Cerebral Tissue Perfusion (Stroke, Ischemic/Transient Ischemic Attack)  Goal: Optimal Cerebral Tissue Perfusion  10/20/2022 2037 by Kimber Sierra RN  Outcome: Ongoing, Progressing  10/20/2022 2037 by Kimber Sierra RN  Outcome: Ongoing, Progressing     Problem: Cognitive Impairment (Stroke, Ischemic/Transient Ischemic Attack)  Goal: Optimal Cognitive Function  10/20/2022 2037 by Kimber Sierra RN  Outcome: Ongoing, Progressing  10/20/2022 2037 by Kimber Sierra RN  Outcome:  Ongoing, Progressing     Problem: Communication Impairment (Stroke, Ischemic/Transient Ischemic Attack)  Goal: Improved Communication Skills  10/20/2022 2037 by Kimber Sierra RN  Outcome: Ongoing, Progressing  10/20/2022 2037 by Kimber Sierra RN  Outcome: Ongoing, Progressing     Problem: Functional Ability Impaired (Stroke, Ischemic/Transient Ischemic Attack)  Goal: Optimal Functional Ability  10/20/2022 2037 by Kimber Sierra RN  Outcome: Ongoing, Progressing  10/20/2022 2037 by Kimber Sierra RN  Outcome: Ongoing, Progressing     Problem: Sensorimotor Impairment (Stroke, Ischemic/Transient Ischemic Attack)  Goal: Improved Sensorimotor Function  10/20/2022 2037 by Kimber Sierra RN  Outcome: Ongoing, Progressing  10/20/2022 2037 by Kimber Sierra RN  Outcome: Ongoing, Progressing     Problem: Swallowing Impairment (Stroke, Ischemic/Transient Ischemic Attack)  Goal: Optimal Eating and Swallowing without Aspiration  10/20/2022 2037 by Kimber Sierra RN  Outcome: Ongoing, Progressing  10/20/2022 2037 by Kimber Sierra RN  Outcome: Ongoing, Progressing     Problem: Urinary Elimination Impaired (Stroke, Ischemic/Transient Ischemic Attack)  Goal: Effective Urinary Elimination  10/20/2022 2037 by Kimber Sierra RN  Outcome: Ongoing, Progressing  10/20/2022 2037 by Kimber Sierra RN  Outcome: Ongoing, Progressing     Problem: Infection  Goal: Absence of Infection Signs and Symptoms  10/20/2022 2037 by Kimber Sierra RN  Outcome: Ongoing, Progressing  10/20/2022 2037 by Kimber Sierra RN  Outcome: Ongoing, Progressing     Problem: Skin Injury Risk Increased  Goal: Skin Health and Integrity  10/20/2022 2037 by Kimber Sierra RN  Outcome: Ongoing, Progressing  10/20/2022 2037 by Kimber Sierra RN  Outcome: Ongoing, Progressing     Problem: Coping Ineffective  Goal: Effective Coping  10/20/2022 2037 by Kimber Sierra RN  Outcome: Ongoing, Progressing  10/20/2022 2037 by Kimber Sierra RN  Outcome: Ongoing,  Progressing

## 2022-10-21 NOTE — CONSULTS
ConsultsPatient Name: Daina Kinsey   MRN: [id number]   Admission Date: 10/7/2022   Hospital Length of Stay: 14   Attending Provider: Lluvia Alberto MD   Consulting Provider: Sal Combs M.D.  Reason for Consult: Goals of Care  Primary Care Physician: Aric Machado MD     Principal Problem: Stroke     Patient information was obtained from relative(s) and ER records.      Final diagnoses:  [I63.9] Stroke  [R29.90] Stroke-like symptoms (Primary)  [R47.01] Aphasia     Assessment/Plan:     I reviewed the patient's current clinical status with the nurse and physician. I reviewed clinical documentation, labs and imaging.   I had a family meeting including 2 of her sons and 2 daughters. 1 daughter, Marialuisa, states that she has POA. I asked if she had medical POA. She was not able to answer this. I asked if she could let us know for future decision-making. The other 4 siblings could not be present or on the phone despite a reasonable attempt for a consensus.     I began by reviewing the family's understanding of the patient's overall medical condition which includes dementia s/p recurrent CVAs. They confirm that the patient is bed bound but is able to feed herself. She is incontinent of bowel and bladder. She is able to communicate simple needs and has significant memory deficits. I verified that the family has the goal of transfer to a SNF rehab prior to discharge home. Considering her baseline debility and dementia, I asked what goals the family might have for rehab. Her son, Tucker said that he would like to see her use her left arm better. I explained that the immediate concern is that the patient is not eating sufficiently enough for the planned goal of SNF rehab. She is also not taking her medications consistently. His family states that these things would be better if her daughters could be with her to encourage po intake and for meds. I explained that she is currently on a modified diet due to dysphagia and I  acknowledged the fact that the patient would respond better with familiar faces present and also if she were in a familiar environment.     I offered the option of discharge to home whereby she may respond much better all around.I explained that with advanced dementia, any changes in a person's environment can cause increased stress/ anxiety which can lead to refusal to eat/ drink or take meds as well as cooperate with unrecognized staff, including therapists. I am concerned that to transfer her to another facility will lead to the same problem. Her children stated that they would like to go to the SNF in [place] because they have family that are on staff their who can encourage the patient to participate.     I completed my explanation that with progressive dementia, she might do better from a quality of life-standpoint to avoid further hospitalizations and stay home with medical care provided there. Her family asked about home services including PCP services. I explained about services such as [facility] and [facility] that provide NP home PCP visits which can include mobile labs and imaging. They asked if she could continue to see her subspecialists. I told them yes, but that the point of home visits is to try to do all care at home if possible. I also reviewed palliative care and hospice options. I encouraged hospice also due to the fact that her condition is progressive and this might help prevent recurrent hospitalizations if the goal is to stay in the home. Her family did not sound interested in this but they are open to learn more about all options. I will ask Case management to discuss this with family.    I returned to the problem of poor po intake. Her family asked if they could provide outside food that the patient likes. I told them that the consistency of such foods would have to be cleared by speech therapy. I will place a speech consult for them to communicate with family on this issue. I again  "explained that if she continued to not eat sufficiently, she would not be discharged to SNF but a PEG would likely be offered. I discouraged this option as placing a PEG will not improve her quality of life and may have the effect of further complications. I explained that the patient will be at risk of pulling out the peg, necessitating restraints. I instead encouraged to allow the patient to eat and drink what she wishes for her pleasure, understanding that she is limited by her natural disease progression and I encouraged discharge to her home where she may begin to eat and drink better in a place of comfort with people she loves.  Her family states that they would not like to place a PEG but if she is not eating enough, they would consider it. They wish for her to remain here until she is eating better then go to SNF rehab. Unfortunately, despite a long discussion, I do not think that I was able to achieve my goal of explaining the patient's situation and reasonable options well enough for family to understand.    Code status: I reviewed the risks and benefits of ACLS treatment options in the event of a cardiopulmonary arrest and non-cardiopulmonary arrest scenarios. I explained that the patient could not make her own decision, so family is necessary to speak on her behalf, keeping in mind her current state of health and the risks. Her family responded by saying, "our mom has always said that she wanted to be resuscitated and kept alive by any means necessary." They elect Full Code.    Disposition: Family is in favor of skilled rehab. See above discussion.    History of Present Illness:     82 year old female with history of recurrent CVA. She is now admitted with another CVA in PCA and MCA and Left frontal lobe. She also has had aspiration pna requiring antibiotics. She has baseline dementia and has had agitation during admit. I was consulted to review goals of care with family.      Active Ambulatory Problems "     Diagnosis Date Noted    TIA (transient ischemic attack) 01/29/2022     Resolved Ambulatory Problems     Diagnosis Date Noted    No Resolved Ambulatory Problems     Past Medical History:   Diagnosis Date    Arthritis     Atherosclerosis     Cataracts, bilateral     COPD (chronic obstructive pulmonary disease)     Gallstones     HTN (hypertension)     PAD (peripheral artery disease)     Seizure     Stomach ulcer     Stroke     Vitamin D deficiency         Past Surgical History:   Procedure Laterality Date    CREATION OF FEMORAL-TIBIAL ARTERY BYPASS      INFERIOR VENA CAVA ANGIOPLASTY / STENTING      LEG AMPUTATION THROUGH KNEE      right    REPAIR OF CAROTID ARTERY      TOE AMPUTATION      left great toe        Review of patient's allergies indicates:   Allergen Reactions    Cephalexin Hives and Itching    Penicillins           Current Facility-Administered Medications:     acetaminophen tablet 1,000 mg, 1,000 mg, Oral, Q6H PRN, Samaria Bermeo MD, 1,000 mg at 10/08/22 2330    acetaminophen tablet 650 mg, 650 mg, Oral, Q4H PRN, Samaria Bermeo MD, 650 mg at 10/12/22 1532    aluminum-magnesium hydroxide-simethicone 200-200-20 mg/5 mL suspension 30 mL, 30 mL, Oral, QID PRN, Samaria Bermeo MD    amino acid 4.25% - dextrose 5% solution, , Intravenous, Continuous, Summer Ga MD, Last Rate: 60 mL/hr at 10/20/22 1945, New Bag at 10/20/22 1945    atorvastatin tablet 40 mg, 40 mg, Oral, Daily, Lluvia Alberto MD, 40 mg at 10/21/22 0943    cloNIDine 0.1 mg/24 hr td ptwk 1 patch, 1 patch, Transdermal, Q7 Days, Summer Ga MD, 1 patch at 10/20/22 1010    dextrose 10% bolus 125 mL, 12.5 g, Intravenous, PRN, RHODA Smith, Stopped at 10/18/22 1640    dextrose 10% bolus 250 mL, 25 g, Intravenous, PRN, RHODA Smith    diltiaZEM 24 hr capsule 120 mg, 120 mg, Oral, Daily, Lluvia Albetro MD, 120 mg at 10/21/22 0943    famotidine (PF) injection 20 mg, 20 mg, Intravenous, Daily, Samaria Bermeo MD, 20 mg at 10/21/22 0944     glucagon (human recombinant) injection 1 mg, 1 mg, Intramuscular, PRN, RHODA Smith    hydrALAZINE injection 10 mg, 10 mg, Intravenous, Q4H PRN, Samaria Bermeo MD, 10 mg at 10/19/22 0544    hydrALAZINE tablet 75 mg, 75 mg, Oral, Q8H, Alfie Santiago MD, 75 mg at 10/18/22 0532    insulin aspart U-100 injection 0-5 Units, 0-5 Units, Subcutaneous, Q6H PRN, RHODA Smith    isosorbide mononitrate 24 hr tablet 120 mg, 120 mg, Oral, Daily, Lluvia Alberto MD, 120 mg at 10/21/22 0942    labetaloL injection 10 mg, 10 mg, Intravenous, Q4H PRN, Samaria Bermeo MD, 10 mg at 10/14/22 2142    labetaloL injection 5 mg, 5 mg, Intravenous, Q2H PRN, Lluvia Alberto MD, 5 mg at 10/15/22 0017    levETIRAcetam (KEPPRA) 500 mg in dextrose 5 % in water (D5W) 5 % 100 mL IVPB (MB+), 500 mg, Intravenous, Q12H, RHODA Smith, Stopped at 10/20/22 2251    lisinopriL tablet 20 mg, 20 mg, Oral, Daily, Lluvia Alberto MD, 20 mg at 10/21/22 0945    melatonin tablet 6 mg, 6 mg, Oral, Nightly PRN, Samaria Bermeo MD, 6 mg at 10/08/22 2023    metoprolol succinate (TOPROL-XL) 24 hr tablet 50 mg, 50 mg, Oral, Daily, Lluvia Alberto MD, 50 mg at 10/21/22 0944    montelukast tablet 10 mg, 10 mg, Oral, Daily, Lluvia Alberto MD, 10 mg at 10/21/22 0944    nicotine 21 mg/24 hr 1 patch, 1 patch, Transdermal, Daily, Lluvia Alberto MD, 1 patch at 10/21/22 0943    ondansetron injection 4 mg, 4 mg, Intravenous, Q4H PRN, Samaria Bermeo MD, 4 mg at 10/09/22 2145    polyethylene glycol packet 17 g, 17 g, Oral, BID PRN, Samaria Bermeo MD    prochlorperazine injection Soln 5 mg, 5 mg, Intravenous, Q6H PRN, Samaria Bermeo MD    senna-docusate 8.6-50 mg per tablet 1 tablet, 1 tablet, Oral, BID PRN, Samaria Bermeo MD    simethicone chewable tablet 80 mg, 1 tablet, Oral, QID PRN, Samaria Bermeo MD    sodium chloride 0.9% flush 10 mL, 10 mL, Intravenous, PRN, Brennon GREENWOOD Rodriguez,     sodium chloride 0.9% flush 10 mL, 10 mL, Intravenous, PRN, Summer Ga MD  "    acetaminophen, acetaminophen, aluminum-magnesium hydroxide-simethicone, dextrose 10%, dextrose 10%, glucagon (human recombinant), hydrALAZINE, insulin aspart U-100, labetalol, labetaloL, melatonin, ondansetron, polyethylene glycol, prochlorperazine, senna-docusate 8.6-50 mg, simethicone, sodium chloride 0.9%, sodium chloride 0.9%     History reviewed. No pertinent family history.     Review of Systems   Unable to perform ROS: Dementia          Objective:   BP (!) 168/66   Pulse 85   Temp 98.5 °F (36.9 °C) (Axillary)   Resp 20   Ht 5' 0.98" (1.549 m)   Wt 47.6 kg (104 lb 15 oz)   SpO2 (!) 94%   Breastfeeding No   BMI 19.84 kg/m²      Physical Exam  Vitals reviewed.   Constitutional:       General: She is not in acute distress.     Appearance: She is not ill-appearing or toxic-appearing.   HENT:      Head: Normocephalic.      Right Ear: External ear normal.      Left Ear: External ear normal.      Nose: Nose normal.      Mouth/Throat:      Mouth: Mucous membranes are moist.      Pharynx: Oropharynx is clear.   Eyes:      Conjunctiva/sclera: Conjunctivae normal.      Pupils: Pupils are equal, round, and reactive to light.   Cardiovascular:      Rate and Rhythm: Normal rate and regular rhythm.      Pulses: Normal pulses.      Heart sounds: Normal heart sounds.   Pulmonary:      Effort: Pulmonary effort is normal.      Breath sounds: Normal breath sounds.   Abdominal:      General: Abdomen is flat. Bowel sounds are normal. There is no distension.      Tenderness: There is no abdominal tenderness.   Skin:     General: Skin is warm.   Neurological:      Mental Status: She is alert. She is disoriented.      Cranial Nerves: Cranial nerve deficit present.      Motor: Weakness present.      Comments: Left hemiparesis   Psychiatric:         Mood and Affect: Mood normal.          Review of Symptoms  Review of Symptoms      Symptom Assessment (ESAS 0-10 Scale)  Pain:  0  Dyspnea:  0  Anxiety:  0  Nausea:  " 0  Depression:  0  Anorexia:  0  Fatigue:  0  Insomnia:  0  Restlessness:  0  Agitation:  0 due to Dementia     CAM / Delirium:  Negative  Constipation:  Negative  Diarrhea:  Negative      Bowel Management Plan (BMP):  Yes      Pain Assessment:  OME in 24 hours:  0  Location(s):      Pain Assessment in Advanced Demential Scale (PAINAD)   Breathing - Independent of vocalization:  0  Negative vocalization:  0  Facial expression:  0  Body language:  0  Consolability:  0  Total:  0    Modified Laura Scale:  0    Performance Status:  30    Living Arrangements:  Lives in home and Lives with family    Spiritual:  F - Makayla and Belief:  [employer]oro[employer] of [place]     Time-Based Charting:  Yes    Total Time Spent: 0 minutes    Advance Care Planning   Advance Directives:   Living Will: No    LaPOST: No    Do Not Resuscitate Status: No    Medical Power of : No    Agent's Name:  Mague Leeanne Sherman   Agent's Contact Number:  [phone]    Decision Making:  Family answered questions and Patient unable to communicate due to disease severity/cognitive impairment  Goals of Care: What is most important right now is to focus on curative/life-prolongation (regardless of treatment burdens). Accordingly, we have decided that the best plan to meet the patient's goals includes continuing with treatment.          Caregiver burden formerly assessed: yes        > 50% of 35 min of encounter was spent in chart review, face to face discussion of goals of care, symptom assessment, coordination of care and emotional support.     Includes further 35 min with family in ACP discussion    Sal Combs M.D.  Palliative Medicine  [facility]

## 2022-10-21 NOTE — PLAN OF CARE
Patient has been accepted to [facility] in [place] once nutrition status/decision has been made.

## 2022-10-21 NOTE — PROGRESS NOTES
[facility] Hospital Medicine Progress Note        Chief Complaint: Inpatient follow-up on stroke    HPI:   Patient is an 82-year-old  female with past medical history of multiple CVAs with residual left-sided weakness, left facial droop, dysarthria and baseline confusion.  Latest CVA in February, 2022.  Bubble study negative.  Ultrasound carotid with less than 50% stenosis bilaterally.  No atrial fibrillation.  Patient was then discharged on Eliquis, Plavix, statin.  Aspirin was discontinued.  Patient presented back to the ED with new onset aphasia-expressive.  Patient was afebrile, hypertensive in the ED.  EKG-NSR.  CT head was negative.  Did confirm the old infarcts.  Patient was transferred to our hospital for Neurology evaluation.  Stroke protocol initiated.  Workup ordered.  Neurology consulted.  MRI negative for CVA.  Symptoms most likely secondary to dehydration, on IV hydration.  Family requesting skilled nursing facility placement.  Home meds resumed as appropriate.  Neurology recommended to continue Eliquis, Plavix.  Patient was agitated on 10/09, gave her a dose of Haldol following which she had worsening mentation with lethargic, drowsiness and hypotension, hypotension improved with IV fluids.  Holding all mood altering drugs.  Placed on IV fluids and kept NPO.  CT head, ABG non impressive.  Chest x-ray consistent with aspiration pneumonia, initiated appropriate antibiotics-Cipro and Flagyl.  Patient has allergy to penicillin.  Patient still lethargic and drowsy on 10/10.  Keeping NPO, on IV fluids.  Mentation much better on 10/11.  Speech cleared patient for dysphagia diet.      Interval Hx:   Patient seen and examined no family member bedside  Objective/physical exam:  General:  Cachectic chronically ill-appearing  female in no acute distress  HENT: normocephalic, atraumatic  Eye: PERRL, EOMI, clear conjunctiva  Neck: full ROM, no  thyromegaly  Respiratory:  Diminished breath sounds bilaterally  Cardiovascular: regular rate and rhythm  Gastrointestinal: non-distended, positive bowel sounds, non-tender  Musculoskeletal:  Right above-the-knee amputation  Integumentary: warm, dry, intact, no rashes  Neurological: cranial nerves grossly intact, left facial droop, left hemiparesis  Psychiatric:  Demented with flat affect    VITAL SIGNS: 24 HRS MIN & MAX LAST   Temp  Min: 97.7 °F (36.5 °C)  Max: 98.8 °F (37.1 °C) 98.5 °F (36.9 °C)   BP  Min: 143/76  Max: 168/66 (!) 168/66   Pulse  Min: 84  Max: 97  85   Resp  Min: 15  Max: 20 20   SpO2  Min: 94 %  Max: 98 % (!) 94 %       Recent Labs   Lab 10/20/22  0838 10/21/22  0817   WBC 8.3 10.7   RBC 4.34 3.73*   HGB 12.8 11.5*   HCT 40.2 34.2*   MCV 92.6 91.7   MCH 29.5 30.8   MCHC 31.8* 33.6   RDW 14.3 14.2    296   MPV 9.2 9.6       Recent Labs   Lab 10/16/22  1927 10/20/22  0838 10/21/22  0817    137 134*   K 3.1* 3.2* 2.8*   CO2 20* 25 23   BUN 4.5* 11.4 21.1*   CREATININE 0.73 0.83 0.73   CALCIUM 8.7 9.6 9.3   MG 1.70  --   --           Microbiology Results (last 7 days)       Procedure Component Value Units Date/Time    Blood Culture [[id number]]  (Normal) Collected: 10/10/22 0730    Order Status: Completed Specimen: Blood Updated: 10/15/22 1334     CULTURE, BLOOD ([facility]) Final Report: At 5 days. No growth    Blood Culture [[id number]]  (Normal) Collected: 10/10/22 0730    Order Status: Completed Specimen: Blood Updated: 10/15/22 1334     CULTURE, BLOOD ([facility]) Final Report: At 5 days. No growth             See below for Radiology    Scheduled Med:   atorvastatin  40 mg Oral Daily    cloNIDine 0.1 mg/24 hr td ptwk  1 patch Transdermal Q7 Days    diltiaZEM  120 mg Oral Daily    famotidine (PF)  20 mg Intravenous Daily    hydrALAZINE  75 mg Oral Q8H    isosorbide mononitrate  120 mg Oral Daily    levetiracetam IV  500 mg Intravenous Q12H    lisinopriL  20 mg Oral Daily    metoprolol succinate  50  mg Oral Daily    montelukast  10 mg Oral Daily    nicotine  1 patch Transdermal Daily        Continuous Infusions:   amino acid 4.25% - dextrose 5% solution 60 mL/hr at 10/20/22 1945   None.    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  Aspiration pneumonitis   Oropharyngeal dysphagia  Vascular dementia with behavioral disturbance   Essential hypertension  Peripheral arterial disease status post right femoral-popliteal bypass, right above-the-knee amputation, and left iliac stenting  Coronary artery disease   Seizure disorder  Chronic obstructive pulmonary disease   Bilateral carotid artery stenosis  Anemia of chronic disease  Tobacco abuse      Plan:   Patient is still not eating much so was started on Clinimix   Palliative care team had discussion with the family at this point they do not want feeding tube and they want the patient to be in the hospital until she is eating enough still we have not reach any decision as patient has on and off days on days she will eat and some day she will not eat  I will check with Neurology to see if we can resume Plavix and Eliquis discussed this with the nursing staff  Continue physical therapy and occupational therapy  Patient completed Cipro and Flagyl for aspiration pneumonitis   Resume appropriate home medications  Continue with Clinimix for now hopefully patient eats more  continue with PT and OT  case management is working on placement    VTE  prophylaxis:  SCDs    Patient condition:  Stable but very poor long-term prognosis.      Anticipated discharge and Disposition:     Await skilled nursing facility placement    All diagnosis and differential diagnosis have been reviewed; assessment and plan has been documented; I have personally reviewed the labs and test results that are presently available; I have reviewed the patients medication list; I have reviewed the consulting providers response and recommendations. I have reviewed or attempted to review medical records based upon their availability    All of the patient's questions have been  addressed and answered. Patient's is agreeable to the above stated plan. I will continue to monitor closely and make adjustments to medical management as needed.  _____________________________________________________________________    Nutrition Status:    Radiology:  CT Head Without Contrast  EXAMINATION  CT HEAD WITHOUT CONTRAST    CLINICAL HISTORY  to determine if Eliquis/Plavix can be resumed;    TECHNIQUE  Axial non-contrast CT images of the head were acquired and multiplanar reconstructions accomplished by a CT technologist at a separate workstation, pushed to PACS for physician review.    COMPARISON  15 October 2022    FINDINGS  Images were reviewed in subdural, brain, soft tissue, and bone windows.    Exam quality: Motion/streak artifact limits assessment of the posterior fossa.    Widespread changes of chronic white matter microvascular disease and the left frontal and right posterior parietal encephalomalacia are similar in the short interval.  No areas of new parenchymal attenuation abnormality are identified.  There is no evidence of interval acute intracranial hemorrhage.  No extra-axial fluid collection or midline shift is identified.  There is no discrete mass.    The visualized extracranial soft tissues and regional osseous structures are unchanged.    IMPRESSION  1. No convincing acute intracranial  abnormality.  2. Additional secondary details discussed above, relatively unchanged from the 15 October 2022 appearance.    RADIATION DOSE  Automated tube current modulation, weight-based exposure dosing, and/or iterative reconstruction technique utilized to reach lowest reasonably achievable exposure rate.    DLP: 3079 mGy*cm    Electronically signed by: Connor Damico  Date:    10/20/2022  Time:    08:01      Summer Ga MD   10/21/2022

## 2022-10-21 NOTE — PT/OT/SLP PROGRESS
Occupational Therapy      Patient Name:  Daina Kinsey   MRN:  [id number]    Patient not seen today secondary to BP our of parameters for mobility. /87  at rest.  Will follow-up 10/31/2022.    10/21/2022

## 2022-10-22 NOTE — PROGRESS NOTES
[facility] Hospital Medicine Progress Note        Chief Complaint: Inpatient follow-up on stroke    HPI:   Patient is an 82-year-old  female with past medical history of multiple CVAs with residual left-sided weakness, left facial droop, dysarthria and baseline confusion.  Latest CVA in February, 2022.  Bubble study negative.  Ultrasound carotid with less than 50% stenosis bilaterally.  No atrial fibrillation.  Patient was then discharged on Eliquis, Plavix, statin.  Aspirin was discontinued.  Patient presented back to the ED with new onset aphasia-expressive.  Patient was afebrile, hypertensive in the ED.  EKG-NSR.  CT head was negative.  Did confirm the old infarcts.  Patient was transferred to our hospital for Neurology evaluation.  Stroke protocol initiated.  Workup ordered.  Neurology consulted.  MRI negative for CVA.  Symptoms most likely secondary to dehydration, on IV hydration.  Family requesting skilled nursing facility placement.  Home meds resumed as appropriate.  Neurology recommended to continue Eliquis, Plavix.  Patient was agitated on 10/09, gave her a dose of Haldol following which she had worsening mentation with lethargic, drowsiness and hypotension, hypotension improved with IV fluids.  Holding all mood altering drugs.  Placed on IV fluids and kept NPO.  CT head, ABG non impressive.  Chest x-ray consistent with aspiration pneumonia, initiated appropriate antibiotics-Cipro and Flagyl.  Patient has allergy to penicillin.  Patient still lethargic and drowsy on 10/10.  Keeping NPO, on IV fluids.  Mentation much better on 10/11.  Speech cleared patient for dysphagia diet.      Interval Hx:   Patient seen and examined patient's own and nursing staff bedside they reported patient did have good bit of breakfast this morning     Objective/physical exam:  General:  Cachectic chronically ill-appearing  female in no acute distress  HENT:  normocephalic, atraumatic  Eye: PERRL, EOMI, clear conjunctiva  Neck: full ROM, no thyromegaly  Respiratory:  Diminished breath sounds bilaterally  Cardiovascular: regular rate and rhythm  Gastrointestinal: non-distended, positive bowel sounds, non-tender  Musculoskeletal:  Right above-the-knee amputation  Integumentary: warm, dry, intact, no rashes  Neurological: left facial droop, left hemiparesis  Psychiatric:  Demented with flat affect    VITAL SIGNS: 24 HRS MIN & MAX LAST   Temp  Min: 97.6 °F (36.4 °C)  Max: 98.5 °F (36.9 °C) 98.5 °F (36.9 °C)   BP  Min: 147/84  Max: 152/77 (!) 147/84   Pulse  Min: 85  Max: 102  102   Resp  Min: 16  Max: 18 16   SpO2  Min: 94 %  Max: 96 % (!) 94 %       Recent Labs   Lab 10/20/22  0838 10/21/22  0817   WBC 8.3 10.7   RBC 4.34 3.73*   HGB 12.8 11.5*   HCT 40.2 34.2*   MCV 92.6 91.7   MCH 29.5 30.8   MCHC 31.8* 33.6   RDW 14.3 14.2    296   MPV 9.2 9.6       Recent Labs   Lab 10/16/22  1927 10/20/22  0838 10/21/22  0817    137 134*   K 3.1* 3.2* 2.8*   CO2 20* 25 23   BUN 4.5* 11.4 21.1*   CREATININE 0.73 0.83 0.73   CALCIUM 8.7 9.6 9.3   MG 1.70  --   --           Microbiology Results (last 7 days)       Procedure Component Value Units Date/Time    Blood Culture [[id number]]  (Normal) Collected: 10/10/22 0730    Order Status: Completed Specimen: Blood Updated: 10/15/22 1334     CULTURE, BLOOD ([facility]) Final Report: At 5 days. No growth    Blood Culture [[id number]]  (Normal) Collected: 10/10/22 0730    Order Status: Completed Specimen: Blood Updated: 10/15/22 1334     CULTURE, BLOOD ([facility]) Final Report: At 5 days. No growth             See below for Radiology    Scheduled Med:   atorvastatin  40 mg Oral Daily    cloNIDine 0.1 mg/24 hr td ptwk  1 patch Transdermal Q7 Days    diltiaZEM  120 mg Oral Daily    famotidine (PF)  20 mg Intravenous Daily    hydrALAZINE  75 mg Oral Q8H    isosorbide mononitrate  120 mg Oral Daily    levetiracetam IV  500 mg Intravenous Q12H     lisinopriL  20 mg Oral Daily    metoprolol succinate  50 mg Oral Daily    montelukast  10 mg Oral Daily    nicotine  1 patch Transdermal Daily        Continuous Infusions:   amino acid 4.25% - dextrose 5% solution 60 mL/hr at 10/21/22 2203   None.    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  Aspiration pneumonitis   Oropharyngeal dysphagia  Vascular dementia with behavioral disturbance   Essential hypertension  Peripheral arterial disease status post right femoral-popliteal bypass, right above-the-knee amputation, and left iliac stenting  Coronary artery disease   Seizure disorder  Chronic obstructive pulmonary disease   Bilateral carotid artery stenosis  Anemia of chronic disease  Tobacco abuse      Plan:   Eating slightly better this morning for now will continue with Clinimix   If patient continues to eat better then we might discharge her to skilled nursing facility   Palliative care team had discussion with the family at this point they do not want feeding tube and they want the patient to be in the hospital until she is eating enough still we have not reach any decision as patient has on and off days on days she will eat and some day she will not eat  I will check with Neurology to see if we can resume Plavix and Eliquis discussed this with the nursing staff  Continue physical therapy and occupational therapy  Patient completed Cipro and Flagyl for aspiration pneumonitis   Resume  appropriate home medications  Continue with Clinimix for now hopefully patient eats more  continue with PT and OT  case management is working on placement    VTE prophylaxis:  SCDs    Patient condition:  Stable but very poor long-term prognosis.      Anticipated discharge and Disposition:     Await skilled nursing facility placement    All diagnosis and differential diagnosis have been reviewed; assessment and plan has been documented; I have personally reviewed the labs and test results that are presently available; I have reviewed the patients medication list; I have reviewed the consulting providers response and recommendations. I have reviewed or attempted to review medical records based upon their availability    All of the patient's questions have been  addressed and answered. Patient's is agreeable to the above stated plan. I will continue to monitor closely and make adjustments to medical management as needed.  _____________________________________________________________________    Nutrition Status:    Radiology:  CT Head Without Contrast  EXAMINATION  CT HEAD WITHOUT CONTRAST    CLINICAL HISTORY  to determine if Eliquis/Plavix can be resumed;    TECHNIQUE  Axial non-contrast CT images of the head were acquired and multiplanar reconstructions accomplished by a CT technologist at a separate workstation, pushed to PACS for physician review.    COMPARISON  15 October 2022    FINDINGS  Images were reviewed in subdural, brain, soft tissue, and bone windows.    Exam quality: Motion/streak artifact limits assessment of the posterior fossa.    Widespread changes of chronic white matter microvascular disease and the left frontal and right posterior parietal encephalomalacia are similar in the short interval.  No areas of new parenchymal attenuation abnormality are identified.  There is no evidence of interval acute intracranial hemorrhage.  No extra-axial fluid collection or midline shift is identified.  There is no  discrete mass.    The visualized extracranial soft tissues and regional osseous structures are unchanged.    IMPRESSION  1. No convincing acute intracranial abnormality.  2. Additional secondary details discussed above, relatively unchanged from the 15 October 2022 appearance.    RADIATION DOSE  Automated tube current modulation, weight-based exposure dosing, and/or iterative reconstruction technique utilized to reach lowest reasonably achievable exposure rate.    DLP: 3079 mGy*cm    Electronically signed by: Connor Damico  Date:    10/20/2022  Time:    08:01      Summer Ga MD   10/22/2022

## 2022-10-22 NOTE — PT/OT/SLP EVAL
"Speech Language Pathology Department  Clinical Swallow Evaluation    Patient Name:  Daina Kinsey   MRN:  [id number]  Admitting Diagnosis: Stroke    Recommendations:     General recommendations:  SLP follow up for diet tolerance  and SLP intervention not indicated  Diet recommendations:  Puree Diet - IDDSI Level 4, Liquid Diet Level: Mildly thick liquids - IDDSI Level 2   Swallow strategies/precautions: small bites/sips, Assist with feeding as needed, and medications crushed in puree  Precautions: Standard, aspiration, aphasia    History:     Past Medical History:   Diagnosis Date    Arthritis     Atherosclerosis     Cataracts, bilateral     COPD (chronic obstructive pulmonary disease)     Gallstones     HTN (hypertension)     PAD (peripheral artery disease)     Seizure     Stomach ulcer     Stroke     Vitamin D deficiency        Past Surgical History:   Procedure Laterality Date    CREATION OF FEMORAL-TIBIAL ARTERY BYPASS      INFERIOR VENA CAVA ANGIOPLASTY / STENTING      LEG AMPUTATION THROUGH KNEE      right    REPAIR OF CAROTID ARTERY      TOE AMPUTATION      left great toe       Chest X-Rays: 10/9/2022 "Reticulonodular opacities may be related to aspiration or vascular status."    Home Diet: Regular and thin liquids  Current Method of Nutrition: TPN/PPN    Patient complaint: "Cover me up" Cognitive status negatively impacts comprehension of assessment.    Subjective     Patient awake and cooperative.    Patient goals: Unable to state. Pt's caregiver goal is "to eat"     Pain/Comfort: Pain Rating 1: 0/10    Respiratory Status: Room air     Objective:     Oral Musculature Evaluation  Oral Musculature: general weakness, facial asymmetry present, right weakness  Dentition: edentulous  Secretion Management: adequate  Mucosal Quality: good  Mandibular Strength and Mobility: WFL  Oral Labial Strength and Mobility: impaired retraction  Lingual Strength and Mobility: impaired strength  Volitional Cough: present  Voice " Prior to PO Intake: clear    Consistency Fed By Oral Symptoms Pharyngeal Symptoms   Ice chips SLP None None   Thin liquid by straw SLP None None   Thin liquid by spoon SLP None None   Puree x2  SLP Reduced closure around utensil/straw due to refusal after x2 trials None     Assessment:     CVA orders received, chart reviewed, and RN consulted. Pt's son present at time of evaluation, and states pt has poor appetite and is not eating much and has history of dysphagia secondary to multiple CVAs, residual R sided weakness, and dysarthria present. Pt is known to [facility] and MBS was completed on 10/11/2022 with recommendations of puree/mildly thick liquids. Pt demonstrated no signs/sx of aspiration, however limited trials of puree/thin liquids were administered secondary to pt refusing further trials and with pt's history of oropharyngeal dysphagia and  risk for aspiration, it is recommended pt to continue puree/mildly thick liquids for PO safety. Pt's family wishes are no PEG placement and for her to eat. Pt's family is considering palliative care.       Plan:     Patient to be seen:   Plan of Care expires:   Plan of Care reviewed with:  patient, RN  SLP Follow-Up:  Yes      Time Tracking:     SLP Treatment Date:      Speech Start Time:  0805  Speech Stop Time:  0825     Speech Total Time (min):  20 min    Billable minutes:  Swallow and Oral Function Evaluation, 20 minutes       10/22/2022

## 2022-10-23 NOTE — PROGRESS NOTES
[facility] Hospital Medicine Progress Note        Chief Complaint: Inpatient follow-up on stroke    HPI:   Patient is an 82-year-old  female with past medical history of multiple CVAs with residual left-sided weakness, left facial droop, dysarthria and baseline confusion.  Latest CVA in February, 2022.  Bubble study negative.  Ultrasound carotid with less than 50% stenosis bilaterally.  No atrial fibrillation.  Patient was then discharged on Eliquis, Plavix, statin.  Aspirin was discontinued.  Patient presented back to the ED with new onset aphasia-expressive.  Patient was afebrile, hypertensive in the ED.  EKG-NSR.  CT head was negative.  Did confirm the old infarcts.  Patient was transferred to our hospital for Neurology evaluation.  Stroke protocol initiated.  Workup ordered.  Neurology consulted.  MRI negative for CVA.  Symptoms most likely secondary to dehydration, on IV hydration.  Family requesting skilled nursing facility placement.  Home meds resumed as appropriate.  Neurology recommended to continue Eliquis, Plavix.  Patient was agitated on 10/09, gave her a dose of Haldol following which she had worsening mentation with lethargic, drowsiness and hypotension, hypotension improved with IV fluids.  Holding all mood altering drugs.  Placed on IV fluids and kept NPO.  CT head, ABG non impressive.  Chest x-ray consistent with aspiration pneumonia, initiated appropriate antibiotics-Cipro and Flagyl.  Patient has allergy to penicillin.  Patient still lethargic and drowsy on 10/10.  Keeping NPO, on IV fluids.  Mentation much better on 10/11.  Speech cleared patient for dysphagia diet.      Interval Hx:   Patient seen and examined this morning patient's daughter bedside who reported she did eat much yesterday evening     Objective/physical exam:  General:  Cachectic chronically ill-appearing  female in no acute distress  HENT: normocephalic,  atraumatic  Eye: PERRL, EOMI, clear conjunctiva  Neck: full ROM, no thyromegaly  Respiratory:  Diminished breath sounds bilaterally  Cardiovascular: regular rate and rhythm  Gastrointestinal: non-distended, positive bowel sounds, non-tender  Musculoskeletal:  Right above-the-knee amputation  Integumentary: warm, dry, intact, no rashes  Neurological: left facial droop, left hemiparesis  Psychiatric:  Demented with flat affect    VITAL SIGNS: 24 HRS MIN & MAX LAST   Temp  Min: 97.9 °F (36.6 °C)  Max: 99.3 °F (37.4 °C) 98.3 °F (36.8 °C)   BP  Min: 106/64  Max: 165/74 120/69   Pulse  Min: 96  Max: 112  (!) 112   Resp  Min: 16  Max: 18 16   SpO2  Min: 96 %  Max: 100 % 100 %       Recent Labs   Lab 10/20/22  0838 10/21/22  0817   WBC 8.3 10.7   RBC 4.34 3.73*   HGB 12.8 11.5*   HCT 40.2 34.2*   MCV 92.6 91.7   MCH 29.5 30.8   MCHC 31.8* 33.6   RDW 14.3 14.2    296   MPV 9.2 9.6       Recent Labs   Lab 10/16/22  1927 10/20/22  0838 10/21/22  0817    137 134*   K 3.1* 3.2* 2.8*   CO2 20* 25 23   BUN 4.5* 11.4 21.1*   CREATININE 0.73 0.83 0.73   CALCIUM 8.7 9.6 9.3   MG 1.70  --   --           Microbiology Results (last 7 days)       ** No results found for the last 168 hours. **             See below for Radiology    Scheduled Med:   atorvastatin  40 mg Oral Daily    cloNIDine 0.1 mg/24 hr td ptwk  1 patch Transdermal Q7 Days    diltiaZEM  120 mg Oral Daily    famotidine (PF)  20 mg Intravenous Daily    hydrALAZINE  75 mg Oral Q8H    isosorbide mononitrate  120 mg Oral Daily    levetiracetam IV  500 mg Intravenous Q12H    lisinopriL  20 mg Oral Daily    metoprolol tartrate  25 mg Oral BID    montelukast  10 mg Oral Daily    nicotine  1 patch Transdermal Daily        Continuous Infusions:    None.    PRN Meds:  acetaminophen, acetaminophen, aluminum-magnesium hydroxide-simethicone, dextrose 10%, dextrose 10%, glucagon (human recombinant), hydrALAZINE, insulin aspart U-100, labetalol, labetaloL, melatonin,  ondansetron, polyethylene glycol, prochlorperazine, senna-docusate 8.6-50 mg, simethicone, sodium chloride 0.9%, sodium chloride 0.9%       Assessment/Plan:  Failure to thrive    Multifocal recent embolic ischemic infarctions with secondary petechial hemorrhage of the left occipital region  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  Aspiration pneumonitis   Oropharyngeal dysphagia  Vascular dementia with behavioral disturbance   Essential hypertension  Peripheral arterial disease status post right femoral-popliteal bypass, right above-the-knee amputation, and left iliac stenting  Coronary artery disease   Seizure disorder  Chronic obstructive pulmonary disease   Bilateral carotid artery stenosis  Anemia of chronic disease  Tobacco abuse      Plan:  Again had a long discussion with patient's daughter who apparently herself is a caregiver did tell her that if she is not eating enough then our only option will be either going palliative care versus putting a feeding tube .  Patient's daughter said that she will discuss with the whole family  Will switch metoprolol to tartrate 25 p.o. b.i.d. as via pressing the medications and giving  Palliative care team had discussion with the family at this point they do not want feeding tube and they want the patient to be in the hospital until she is eating enough still we have not reach any decision as patient has on and off days on days she will eat and some day she will not eat  I will check with Neurology to see if we can resume Plavix and Eliquis discussed this with the nursing staff  Continue physical therapy and occupational therapy  Patient completed Cipro and Flagyl for aspiration pneumonitis   Resume appropriate home medications  Continue with Clinimix for now hopefully patient eats more  continue with PT and OT  case management is working on placement    VTE prophylaxis:  SCDs    Patient condition:  Stable but very poor long-term prognosis.       Anticipated discharge and Disposition:     Await skilled nursing facility placement    All diagnosis and differential diagnosis have been reviewed; assessment and plan has been documented; I have personally reviewed the labs and test results that are presently available; I have reviewed the patients medication list; I have reviewed the consulting providers response and recommendations. I have reviewed or attempted to review medical records based upon their availability    All of the patient's questions have been  addressed and answered. Patient's is agreeable to the above stated plan. I will continue to monitor closely and make adjustments to medical management as needed.  _____________________________________________________________________    Nutrition Status:    Radiology:  CT Head Without Contrast  EXAMINATION  CT HEAD WITHOUT CONTRAST    CLINICAL HISTORY  to determine if Eliquis/Plavix can be resumed;    TECHNIQUE  Axial non-contrast CT images of the head were acquired and multiplanar reconstructions accomplished by a CT technologist at a separate workstation, pushed to PACS for physician review.    COMPARISON  15 October 2022    FINDINGS  Images were reviewed in subdural, brain, soft tissue, and bone windows.    Exam quality: Motion/streak artifact limits assessment of the posterior fossa.    Widespread changes of chronic white matter microvascular disease and the left frontal and right posterior parietal encephalomalacia are similar in the short interval.  No areas of new parenchymal attenuation abnormality are identified.  There is no evidence of interval acute intracranial hemorrhage.  No extra-axial fluid collection or midline shift is identified.  There is no discrete mass.    The visualized extracranial soft tissues and regional osseous structures are unchanged.    IMPRESSION  1. No convincing acute intracranial abnormality.  2. Additional secondary details discussed above, relatively unchanged from  the 15 October 2022 appearance.    RADIATION DOSE  Automated tube current modulation, weight-based exposure dosing, and/or iterative reconstruction technique utilized to reach lowest reasonably achievable exposure rate.    DLP: 3079 mGy*cm    Electronically signed by: Connor Damico  Date:    10/20/2022  Time:    08:01      Summer Ga MD   10/23/2022

## 2022-10-23 NOTE — PLAN OF CARE
Problem: Adult Inpatient Plan of Care  Goal: Plan of Care Review  Outcome: Ongoing, Progressing  Goal: Patient-Specific Goal (Individualized)  Outcome: Ongoing, Progressing  Goal: Absence of Hospital-Acquired Illness or Injury  Outcome: Ongoing, Progressing  Goal: Optimal Comfort and Wellbeing  Outcome: Ongoing, Progressing  Goal: Readiness for Transition of Care  Outcome: Ongoing, Progressing     Problem: Adjustment to Illness (Stroke, Ischemic/Transient Ischemic Attack)  Goal: Optimal Coping  Outcome: Ongoing, Progressing     Problem: Bowel Elimination Impaired (Stroke, Ischemic/Transient Ischemic Attack)  Goal: Effective Bowel Elimination  Outcome: Ongoing, Progressing     Problem: Cerebral Tissue Perfusion (Stroke, Ischemic/Transient Ischemic Attack)  Goal: Optimal Cerebral Tissue Perfusion  Outcome: Ongoing, Progressing     Problem: Cognitive Impairment (Stroke, Ischemic/Transient Ischemic Attack)  Goal: Optimal Cognitive Function  Outcome: Ongoing, Progressing     Problem: Communication Impairment (Stroke, Ischemic/Transient Ischemic Attack)  Goal: Improved Communication Skills  Outcome: Ongoing, Progressing     Problem: Functional Ability Impaired (Stroke, Ischemic/Transient Ischemic Attack)  Goal: Optimal Functional Ability  Outcome: Ongoing, Progressing     Problem: Sensorimotor Impairment (Stroke, Ischemic/Transient Ischemic Attack)  Goal: Improved Sensorimotor Function  Outcome: Ongoing, Progressing     Problem: Swallowing Impairment (Stroke, Ischemic/Transient Ischemic Attack)  Goal: Optimal Eating and Swallowing without Aspiration  Outcome: Ongoing, Progressing     Problem: Urinary Elimination Impaired (Stroke, Ischemic/Transient Ischemic Attack)  Goal: Effective Urinary Elimination  Outcome: Ongoing, Progressing     Problem: Infection  Goal: Absence of Infection Signs and Symptoms  Outcome: Ongoing, Progressing     Problem: Skin Injury Risk Increased  Goal: Skin Health and Integrity  Outcome:  Ongoing, Progressing     Problem: Coping Ineffective  Goal: Effective Coping  Outcome: Ongoing, Progressing     Problem: Impaired Wound Healing  Goal: Optimal Wound Healing  Outcome: Ongoing, Progressing

## 2022-10-23 NOTE — PROCEDURES
"Daina Kinsey is a 82 y.o. female patient.    Temp: 97.9 °F (36.6 °C) (10/23/22 1108)  Pulse: 109 (10/23/22 1108)  Resp: 18 (10/23/22 1108)  BP: 122/70 (10/23/22 1108)  SpO2: 97 % (10/23/22 1108)  Weight: 47.6 kg (104 lb 15 oz) (10/08/22 1225)  Height: 5' 0.98" (154.9 cm) (10/08/22 1225)    PICC  Date/Time: 10/23/2022 1:17 PM  Performed by: Jair Corbett RN  Consent Done: Yes  Time out: Immediately prior to procedure a time out was called to verify the correct patient, procedure, equipment, support staff and site/side marked as required  Indications: vascular access and med administration  Anesthesia: local infiltration  Local anesthetic: lidocaine 1% without epinephrine  Anesthetic Total (mL): 5  Preparation: skin prepped with ChloraPrep  Skin prep agent dried: skin prep agent completely dried prior to procedure  Sterile barriers: all five maximum sterile barriers used - cap, mask, sterile gown, sterile gloves, and large sterile sheet  Hand hygiene: hand hygiene performed prior to central venous catheter insertion  Location details: left brachial  Catheter type: single lumen  Catheter size: 4 Fr  Catheter Length: 12cm    Ultrasound guidance: yes  Vessel Caliber: medium and patent, compressibility normal  Vascular Doppler: not done  Needle advanced into vessel with real time Ultrasound guidance.  Guidewire confirmed in vessel.  Sterile sheath used.  no esophageal manometryNumber of attempts: 1  Post-procedure: blood return through all ports, chlorhexidine patch and sterile dressing applied  Technical procedures used: modified seldinger    Assessment: successful placement  Comments: RUE swollen and red from infiltrated PIV placed in upper arm. LUE flaccid. Dr. Ga contacted. OK with LUE insertion. Family ok with this also. Encouraged passive ROM to MAYCOL Corbett RN  10/23/2022    "

## 2022-10-23 NOTE — PT/OT/SLP PROGRESS
SLP spoke with RN regarding patient's diet tolerance and swallow precautions. No issues or complaints at this time. RN does endorse decreased appetite. ST to continue to follow.

## 2022-10-24 NOTE — PT/OT/SLP PROGRESS
Physical Therapy      Patient Name:  Daina Kinsey   MRN:  [id number]    Pt's son having discussion with palliative medicine physician in room. Will check back with pt tomorrow.

## 2022-10-24 NOTE — PROGRESS NOTES
[facility] Hospital Medicine Progress Note        Chief Complaint: Inpatient follow-up on stroke    HPI:   Patient is an 82-year-old  female with past medical history of multiple CVAs with residual left-sided weakness, left facial droop, dysarthria and baseline confusion.  Latest CVA in February, 2022.  Bubble study negative.  Ultrasound carotid with less than 50% stenosis bilaterally.  No atrial fibrillation.  Patient was then discharged on Eliquis, Plavix, statin.  Aspirin was discontinued.  Patient presented back to the ED with new onset aphasia-expressive.  Patient was afebrile, hypertensive in the ED.  EKG-NSR.  CT head was negative.  Did confirm the old infarcts.  Patient was transferred to our hospital for Neurology evaluation.  Stroke protocol initiated.  Workup ordered.  Neurology consulted.  MRI negative for CVA.  Symptoms most likely secondary to dehydration, on IV hydration.  Family requesting skilled nursing facility placement.  Home meds resumed as appropriate.  Neurology recommended to continue Eliquis, Plavix.  Patient was agitated on 10/09, gave her a dose of Haldol following which she had worsening mentation with lethargic, drowsiness and hypotension, hypotension improved with IV fluids.  Holding all mood altering drugs.  Placed on IV fluids and kept NPO.  CT head, ABG non impressive.  Chest x-ray consistent with aspiration pneumonia, initiated appropriate antibiotics-Cipro and Flagyl.  Patient has allergy to penicillin.  Patient still lethargic and drowsy on 10/10.  Keeping NPO, on IV fluids.  Mentation much better on 10/11.  Speech cleared patient for dysphagia diet.      Interval Hx:   Patient seen and examined this morning patient's son bedside discussed with him the 2 options of either hospice versus putting a feeding tube.  He verbalizes understanding but he reports he still needs to talk to his other family members   Objective/physical  exam:  General:  Cachectic chronically ill-appearing  female in no acute distress  HENT: normocephalic, atraumatic  Eye: PERRL, EOMI, clear conjunctiva  Neck: full ROM, no thyromegaly  Respiratory:  Diminished breath sounds bilaterally  Cardiovascular: regular rate and rhythm  Gastrointestinal: non-distended, positive bowel sounds, non-tender  Musculoskeletal:  Right above-the-knee amputation  Integumentary: warm, dry, intact, no rashes  Neurological: left facial droop, left hemiparesis  Psychiatric:  Demented with flat affect    VITAL SIGNS: 24 HRS MIN & MAX LAST   Temp  Min: 98.5 °F (36.9 °C)  Max: 99.6 °F (37.6 °C) 99.6 °F (37.6 °C)   BP  Min: 111/72  Max: 145/76 135/76   Pulse  Min: 92  Max: 116  100   Resp  Min: 16  Max: 18 18   SpO2  Min: 92 %  Max: 97 % (!) 92 %       Recent Labs   Lab 10/20/22  0838 10/21/22  0817   WBC 8.3 10.7   RBC 4.34 3.73*   HGB 12.8 11.5*   HCT 40.2 34.2*   MCV 92.6 91.7   MCH 29.5 30.8   MCHC 31.8* 33.6   RDW 14.3 14.2    296   MPV 9.2 9.6       Recent Labs   Lab 10/20/22  0838 10/21/22  0817 10/23/22  1107    134* 135*   K 3.2* 2.8* 2.5*   CO2 25 23 23   BUN 11.4 21.1* 28.7*   CREATININE 0.83 0.73 0.87   CALCIUM 9.6 9.3 9.8          Microbiology Results (last 7 days)       ** No results found for the last 168 hours. **             See below for Radiology    Scheduled Med:   apixaban  5 mg Oral BID    atorvastatin  40 mg Oral Daily    cloNIDine 0.1 mg/24 hr td ptwk  1 patch Transdermal Q7 Days    diltiaZEM  120 mg Oral Daily    famotidine (PF)  20 mg Intravenous Daily    hydrALAZINE  75 mg Oral Q8H    isosorbide mononitrate  120 mg Oral Daily    levetiracetam IV  500 mg Intravenous Q12H    lisinopriL  20 mg Oral Daily    metoprolol tartrate  25 mg Oral BID    montelukast  10 mg Oral Daily    nicotine  1 patch Transdermal Daily    sodium chloride 0.9%  10 mL Intravenous Q6H        Continuous Infusions:   amino acid 4.25% - dextrose 5% solution 65 mL/hr at  10/23/22 1836     None.    PRN Meds:  acetaminophen, acetaminophen, aluminum-magnesium hydroxide-simethicone, dextrose 10%, dextrose 10%, glucagon (human recombinant), hydrALAZINE, insulin aspart U-100, labetalol, labetaloL, melatonin, ondansetron, polyethylene glycol, prochlorperazine, senna-docusate 8.6-50 mg, simethicone, sodium chloride 0.9%, sodium chloride 0.9%, Flushing PICC Protocol **AND** sodium chloride 0.9% **AND** sodium chloride 0.9%       Assessment/Plan:  Failure to thrive    Multifocal recent embolic ischemic infarctions with secondary petechial hemorrhage of the left occipital region  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  Aspiration pneumonitis   Oropharyngeal dysphagia  Vascular dementia with behavioral disturbance   Essential hypertension  Peripheral arterial disease status post right femoral-popliteal bypass, right above-the-knee amputation, and left iliac stenting  Coronary artery disease   Seizure disorder  Chronic obstructive pulmonary disease   Bilateral carotid artery stenosis  Anemia of chronic disease  Tobacco abuse      Plan:  Patient did not eat much yesterday be started her back on Clinimix   Nursing Ms. Corey discussed with neurology and they were okay resuming Plavix and Eliquis and that has been resumed  Continue with metoprolol  Palliative care following still no decision reached again talked to patient's son who is in agreement but reports he needs to talk to his other family members  Continue physical therapy and occupational therapy  Patient completed Cipro and Flagyl for aspiration pneumonitis   Resume appropriate home medications  Continue with Clinimix for now   continue with PT and OT  case management is working on placement    VTE prophylaxis:  SCDs    Patient condition:  Stable but very poor long-term prognosis.      Anticipated discharge and Disposition:     Await skilled nursing facility placement    All diagnosis and differential  diagnosis have been reviewed; assessment and plan has been documented; I have personally reviewed the labs and test results that are presently available; I have reviewed the patients medication list; I have reviewed the consulting providers response and recommendations. I have reviewed or attempted to review medical records based upon their availability    All of the patient's questions have been  addressed and answered. Patient's is agreeable to the above stated plan. I will continue to monitor closely and make adjustments to medical management as needed.  _____________________________________________________________________    Nutrition Status:    Radiology:  CT Head Without Contrast  EXAMINATION  CT HEAD WITHOUT CONTRAST    CLINICAL HISTORY  to determine if Eliquis/Plavix can be resumed;    TECHNIQUE  Axial non-contrast CT images of the head were acquired and multiplanar reconstructions accomplished by a CT technologist at a separate workstation, pushed to PACS for physician review.    COMPARISON  15 October 2022    FINDINGS  Images were reviewed in subdural, brain, soft tissue, and bone windows.    Exam quality: Motion/streak artifact limits assessment of the posterior fossa.    Widespread changes of chronic white matter microvascular disease and the left frontal and right posterior parietal encephalomalacia are similar in the short interval.  No areas of new parenchymal attenuation abnormality are identified.  There is no evidence of interval acute intracranial hemorrhage.  No extra-axial fluid collection or midline shift is identified.  There is no discrete mass.    The visualized extracranial soft tissues and regional osseous structures are unchanged.    IMPRESSION  1. No convincing acute intracranial abnormality.  2. Additional secondary details discussed above, relatively unchanged from the 15 October 2022 appearance.    RADIATION DOSE  Automated tube current modulation, weight-based exposure dosing, and/or  iterative reconstruction technique utilized to reach lowest reasonably achievable exposure rate.    DLP: 3079 mGy*cm    Electronically signed by: Connor Damico  Date:    10/20/2022  Time:    08:01      Summer Ga MD   10/24/2022

## 2022-10-24 NOTE — CONSULTS
"ConsultsPatient Name: Daina Kinsey   MRN: [id number]   Admission Date: 10/7/2022   Hospital Length of Stay: 17   Attending Provider: Lluvia Alberto MD   Consulting Provider: Sal Combs M.D.  Reason for Consult: Goals of Care  Primary Care Physician: Aric Machado MD     Principal Problem: Stroke     Patient information was obtained from relative(s) and ER records.      Final diagnoses:  [I63.9] Stroke  [R29.90] Stroke-like symptoms (Primary)  [R47.01] Aphasia     Assessment/Plan:     I reviewed the patient's current clinical status with the nurse and physician. I reviewed clinical documentation, labs and imaging. I met with the patient's son Jazmine and reviewed the patient's clinical situation. I explained that she is not eating. When fed, she spits out the food. Nurses have noted that she has not eaten anything in 2 days inspite of being fed by family. Jazmine agrees that she is not eating. He said, "I believe that my sister, Leeanne and I understand that she is not getting better duue to her dementia and strokes.  We think that she needs a PEG placed to feed her and then admit her to a nursing home for rehab. She lives with me and I can't take care of her any more."  I explained that a PEG could be placed to feed her. This would keep her alive or prolong her life, but it would not improve her quality of life and would not prevent her from having further medical complications of her stroke and dementia. These complications such as wounds, aspiration, clots and other infections will result in recurrent admits from her home (nursing home or current home). He said, "that's what we want, to prolong her life as long as we can."      I encouraged a consideration for setting up hospice care (whether they elect PEG placement or not) to allow the patient to have eventually have a comfort in death and prevent recurrent admits which will result in her death in hospital which will likely not be a comfortable setting. " Christine agreed with hospice but realizes that he would need a consensus of his family. He feels that his family is not in agreement for many things.     I asked if he can find out if his sister, Marialuisa has POA, as stated on Friday. He will look into this. He asked if this can be created if not. I told him no, as the patient has no decision-making capacity and so can not elect a POA at present. I told him without POA, a consensus of his siblings are necessary to decide upon PEG, NH placement, hospice or anything else. I offered another meeting with family to allow us to move forward. He said that he will work on this.      History of Present Illness:     82 year old female with history of recurrent CVA. She is now admitted with another CVA in PCA and MCA and Left frontal lobe. She also has had aspiration pna requiring antibiotics. She has baseline dementia and has had agitation during admit. I was consulted to review goals of care with family.      Active Ambulatory Problems     Diagnosis Date Noted    TIA (transient ischemic attack) 01/29/2022     Resolved Ambulatory Problems     Diagnosis Date Noted    No Resolved Ambulatory Problems     Past Medical History:   Diagnosis Date    Arthritis     Atherosclerosis     Cataracts, bilateral     COPD (chronic obstructive pulmonary disease)     Gallstones     HTN (hypertension)     PAD (peripheral artery disease)     Seizure     Stomach ulcer     Stroke     Vitamin D deficiency         Past Surgical History:   Procedure Laterality Date    CREATION OF FEMORAL-TIBIAL ARTERY BYPASS      INFERIOR VENA CAVA ANGIOPLASTY / STENTING      LEG AMPUTATION THROUGH KNEE      right    REPAIR OF CAROTID ARTERY      TOE AMPUTATION      left great toe        Review of patient's allergies indicates:   Allergen Reactions    Cephalexin Hives and Itching    Penicillins           Current Facility-Administered Medications:     acetaminophen tablet 1,000 mg, 1,000 mg, Oral, Q6H PRN, Samaria Bermeo MD,  1,000 mg at 10/22/22 2303    acetaminophen tablet 650 mg, 650 mg, Oral, Q4H PRN, Samaria Bermeo MD, 650 mg at 10/12/22 1532    aluminum-magnesium hydroxide-simethicone 200-200-20 mg/5 mL suspension 30 mL, 30 mL, Oral, QID PRN, Samaria Bermeo MD    amino acid 4.25% - dextrose 5% solution, , Intravenous, Continuous, Summer Ga MD, Last Rate: 65 mL/hr at 10/23/22 1836, New Bag at 10/23/22 1836    apixaban tablet 5 mg, 5 mg, Oral, BID, Summer Ga MD, 5 mg at 10/24/22 0932    atorvastatin tablet 40 mg, 40 mg, Oral, Daily, Lluvia Alberto MD, 40 mg at 10/24/22 0932    cloNIDine 0.1 mg/24 hr td ptwk 1 patch, 1 patch, Transdermal, Q7 Days, Summer Ga MD, 1 patch at 10/20/22 1010    dextrose 10% bolus 125 mL, 12.5 g, Intravenous, PRN, RHODA Smith, Stopped at 10/18/22 1640    dextrose 10% bolus 250 mL, 25 g, Intravenous, PRN, RHODA Smith    diltiaZEM 24 hr capsule 120 mg, 120 mg, Oral, Daily, Lluvia Alberto MD, 120 mg at 10/24/22 0932    famotidine (PF) injection 20 mg, 20 mg, Intravenous, Daily, Samaria Bermeo MD, 20 mg at 10/24/22 1031    glucagon (human recombinant) injection 1 mg, 1 mg, Intramuscular, PRN, [address], RHODA    hydrALAZINE injection 10 mg, 10 mg, Intravenous, Q4H PRN, Samaria Bermeo MD, 10 mg at 10/22/22 2136    hydrALAZINE tablet 75 mg, 75 mg, Oral, Q8H, Alfie Santiago MD, 75 mg at 10/22/22 1719    insulin aspart U-100 injection 0-5 Units, 0-5 Units, Subcutaneous, Q6H PRN, [address], RHODA    isosorbide mononitrate 24 hr tablet 120 mg, 120 mg, Oral, Daily, Lluvia Alberto MD, 120 mg at 10/24/22 0900    labetaloL injection 10 mg, 10 mg, Intravenous, Q4H PRN, Samaria Bermeo MD, 10 mg at 10/21/22 1514    labetaloL injection 5 mg, 5 mg, Intravenous, Q2H PRN, Lluvia Alberto MD, 5 mg at 10/15/22 0017    levETIRAcetam (KEPPRA) 500 mg in dextrose 5 % in water (D5W) 5 % 100 mL IVPB (MB+), 500 mg, Intravenous, Q12H, [address], RHODA, Stopped at 10/24/22 1003    lisinopriL tablet 20 mg, 20  "mg, Oral, Daily, Lluvia Alberto MD, 20 mg at 10/24/22 0933    melatonin tablet 6 mg, 6 mg, Oral, Nightly PRN, Samaria Bermeo MD, 6 mg at 10/22/22 2304    metoprolol tartrate (LOPRESSOR) tablet 25 mg, 25 mg, Oral, BID, Summer Ga MD, 25 mg at 10/24/22 0933    montelukast tablet 10 mg, 10 mg, Oral, Daily, Lluvia Alberto MD, 10 mg at 10/24/22 0932    nicotine 21 mg/24 hr 1 patch, 1 patch, Transdermal, Daily, Lluvia Alberto MD, 1 patch at 10/24/22 0934    ondansetron injection 4 mg, 4 mg, Intravenous, Q4H PRN, Samaria Bermeo MD, 4 mg at 10/09/22 2145    polyethylene glycol packet 17 g, 17 g, Oral, BID PRN, Samaria Bermeo MD    prochlorperazine injection Soln 5 mg, 5 mg, Intravenous, Q6H PRN, Samaria Bermeo MD    senna-docusate 8.6-50 mg per tablet 1 tablet, 1 tablet, Oral, BID PRN, Samaria Bermeo MD    simethicone chewable tablet 80 mg, 1 tablet, Oral, QID PRN, Samaria Bermeo MD    sodium chloride 0.9% flush 10 mL, 10 mL, Intravenous, PRN, Brennon GREENWOOD Rodriguez, DO    sodium chloride 0.9% flush 10 mL, 10 mL, Intravenous, PRN, Summer Ga MD    Flushing [facility] Protocol, , , Until Discontinued **AND** sodium chloride 0.9% flush 10 mL, 10 mL, Intravenous, Q6H, 10 mL at 10/24/22 0600 **AND** sodium chloride 0.9% flush 10 mL, 10 mL, Intravenous, PRN, Summer Ga MD     acetaminophen, acetaminophen, aluminum-magnesium hydroxide-simethicone, dextrose 10%, dextrose 10%, glucagon (human recombinant), hydrALAZINE, insulin aspart U-100, labetalol, labetaloL, melatonin, ondansetron, polyethylene glycol, prochlorperazine, senna-docusate 8.6-50 mg, simethicone, sodium chloride 0.9%, sodium chloride 0.9%, Flushing PICC Protocol **AND** sodium chloride 0.9% **AND** sodium chloride 0.9%     History reviewed. No pertinent family history.     Review of Systems   Unable to perform ROS: Dementia          Objective:   /76   Pulse 100   Temp 99.6 °F (37.6 °C) (Oral)   Resp 18   Ht 5' 0.98" (1.549 m)   Wt 47.6 kg (104 lb 15 oz)   " SpO2 (!) 92%   Breastfeeding No   BMI 19.84 kg/m²      Physical Exam  Vitals reviewed.   Constitutional:       General: She is not in acute distress.     Appearance: She is not ill-appearing or toxic-appearing.   HENT:      Head: Normocephalic.      Right Ear: External ear normal.      Left Ear: External ear normal.      Nose: Nose normal.      Mouth/Throat:      Mouth: Mucous membranes are moist.      Pharynx: Oropharynx is clear.   Eyes:      Conjunctiva/sclera: Conjunctivae normal.      Pupils: Pupils are equal, round, and reactive to light.   Cardiovascular:      Rate and Rhythm: Normal rate and regular rhythm.      Pulses: Normal pulses.      Heart sounds: Normal heart sounds.   Pulmonary:      Effort: Pulmonary effort is normal.      Breath sounds: Normal breath sounds.   Abdominal:      General: Abdomen is flat. Bowel sounds are normal. There is no distension.      Tenderness: There is no abdominal tenderness.   Skin:     General: Skin is warm.   Neurological:      Mental Status: She is alert. She is disoriented.      Cranial Nerves: Cranial nerve deficit present.      Motor: Weakness present.      Comments: Left hemiparesis   Psychiatric:         Mood and Affect: Mood normal.          Review of Symptoms  Review of Symptoms      Symptom Assessment (ESAS 0-10 Scale)  Pain:  0  Dyspnea:  0  Anxiety:  0  Nausea:  0  Depression:  0  Anorexia:  0  Fatigue:  0  Insomnia:  0  Restlessness:  0  Agitation:  0 due to Dementia     CAM / Delirium:  Negative  Constipation:  Negative  Diarrhea:  Negative      Bowel Management Plan (BMP):  Yes      Pain Assessment:  OME in 24 hours:  0  Location(s):      Pain Assessment in Advanced Demential Scale (PAINAD)   Breathing - Independent of vocalization:  0  Negative vocalization:  0  Facial expression:  0  Body language:  0  Consolability:  0  Total:  0    Modified Laura Scale:  0    Performance Status:  30    Living Arrangements:  Lives in home and Lives with  family    Spiritual:  F - Makayla and Belief:  [employer] [place]     Time-Based Charting:  Yes    Total Time Spent: 0 minutes    Advance Care Planning   Advance Directives:   Living Will: No    [place]POST: No    Do Not Resuscitate Status: No    Medical Power of : No    Agent's Name:  Mague Sherman   Agent's Contact Number:  [phone]    Decision Making:  Family answered questions and Patient unable to communicate due to disease severity/cognitive impairment  Goals of Care: What is most important right now is to focus on curative/life-prolongation (regardless of treatment burdens). Accordingly, we have decided that the best plan to meet the patient's goals includes continuing with treatment.          Caregiver burden formerly assessed: yes        > 50% of 35 min of encounter was spent in chart review, face to face discussion of goals of care, symptom assessment, coordination of care and emotional support.     Includes further 35 min with family in ACP discussion    Sal Combs M.D.  Palliative Medicine  [facility]

## 2022-10-24 NOTE — PT/OT/SLP PROGRESS
POC discussed with nursing and palliative MD.  Pt with reduced PO intake, spitting out food when nursing attempting to feed.  Palliative MD requesting education regarding diet recommendations.  Printed education provided to pt son, Jazmine, in room who verbalized understanding.  SLP to sign off, please reconsult as needed.

## 2022-10-24 NOTE — PROGRESS NOTES
Inpatient Nutrition Assessment    Admit Date: 10/7/2022   Total duration of encounter: 17 days     Nutrition Recommendation/Prescription     - Continue oral diet as per SLP  - If pt continues to refuse oral intake, recommend Peg placement and begin tube feeds. Recommend Isosource HN @ goal rate 55mL/hr to meet 100% est nutritional needs.     -For now continue PPN @ 65 ml/hr.  Provides 530 kcals (40% est needs), 66 g protein (115% est needs), 1560 ml fluid (120% est needs)     Communication of Recommendations: reviewed with nurse    Nutrition Assessment     Malnutrition Assessment/Nutrition-Focused Physical Exam    Malnutrition in the context of acute illness or injury  Degree of Malnutrition: non-severe (moderate) malnutrition  Energy Intake: < 75% of estimated energy requirement for > 7 days  Interpretation of Weight Loss: does not meet criteria  Body Fat:mild depletion  Area of Body Fat Loss: orbital region  and upper arm region - triceps / biceps  Muscle Mass Loss: moderate depletion  Area of Muscle Mass Loss: clavicle bone region - pectoralis major, deltoid, trapezius muscles, dorsal hand - interosseous musle, and patellar region - quadricep muscle  Fluid Accumulation: does not meet criteria  Edema: not applicable   Reduced  Strength: unable to obtain  A minimum of two characteristics is recommended for diagnosis of either severe or non-severe malnutrition.    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, dysarthria, and left facial droop  Bilateral carotid atherosclerotic disease  Probable vascular dementia  Hypertension      Relevant Medical History:   Multiple CVAs-  left-sided weakness, left facial droop, dysarthria, and confusion  Bilateral LONG  PAD - right fem-tib bypass, right AKA, left iliac stent  CAD-obstructive per [facility] 2013  Probable vascular  dementia  Seizure  HTN  HLD  COPD  Osteoarthritis    Nutrition-Related Medications: Atorvastatin, Hydralazine, Lisinopril, Metoprolol, Nicotine, Insulin PRN PPN    Calorie Containing IV Medications: Clinimix (details below)     Nutrition-Related Labs:  10/8: Cl 110, GFR >60  10/13: H/H-10.3/31.4, K-2.7, Bun-9.2  10/16: K-3.1, Bun-4.5, Gluc-79  10/20: Glu 119, K 3.2   10/34: CBGs 127-145    Diet/PN Order: Diet Pureed Mildly Thick Liquids; Supervision with Meals  amino acid 4.25% - dextrose 5% solution  Oral Supplement Order: Boost VHC  Tube Feeding Order: none at this time  Appetite/Oral Intake: poor/0-25% of meals  Factors Affecting Nutritional Intake: chewing difficulty, difficulty/impaired swallowing, and edentulous  Food/Latter-day/Cultural Preferences: none reported    Wound(s): none noted    Comments    10/8: Seen for unsure wt loss. Pt in bed with daughter at bedside. Noted SLP evaluated this am, rec'd minced and moist diet with mildly thick liquids. Pt has not attempted PO intake yet, will monitor tolerance. Per daughter, pt eats fruit, eggs, grits, red beans and rice at home - usually with good intake except for last week or so. Offered ONS VHC- pt receptive. No significant wt loss noted.    10/13: Pt only mumbling during visit. Son at bedside. Pt now NPO. SLP re-consulted. Pt has been refusing medications. Some confusion noted as well. Pt may benefit from Peg placement if unable to resume oral diet due to mental status.     10/17: Pt refusing oral intake. Nsg stated had a hard time getting pt to take oral medications. Tongue is also difficult to move around for intake. If pt continues to refuse, may benefit from Peg placement and begin Tube feeds for primary source of nutrition.     10/20: Pt refusing most food and drink, has been started on PPN. Palliative discussions underway.      10/24: Pt continues to refuse oral meals. When she does eat, she spits out the food. Family is considering Peg placement. Will  "await decision.     Anthropometrics    Height: 5' 0.98" (154.9 cm) Height Method: Stated  Last Weight: 47.6 kg (104 lb 15 oz) (10/08/22 1225) Weight Method: Bed Scale  BMI (Calculated): 19.8-Not appropriate due to Rt BKA  BMI Classification: normal (BMI 18.5-24.9)     Ideal Body Weight (IBW), Female: 104.9 lb     % Ideal Body Weight, Female (lb): 100.04 %        Usual Body Weight (UBW), k kg (98-105lb)  % Usual Body Weight: 103.69  % Weight Change From Usual Weight: 3.48 %  Usual Weight Provided By: family/caregiver    Wt Readings from Last 5 Encounters:   10/08/22 47.6 kg (104 lb 15 oz)   22 47.6 kg (105 lb)   22 45.4 kg (100 lb)   22 46.3 kg (102 lb)   19 46.6 kg (102 lb 11.8 oz)     Weight Change(s) Since Admission:   Admit Weight: 47.6 kg (105 lb) (10/07/22 1421)  10/13: 47.6kg  10/17: 47.6kg  10/24: no new wt    Estimated Needs    Weight Used For Calorie Calculations: 47.6 kg (104 lb 15 oz)  Energy Calorie Requirements (kcal): 1316kcals (28kcal/kg)  Energy Need Method: Kcal/kg  Weight Used For Protein Calculations: 47.6 kg (104 lb 15 oz)  Protein Requirements: 57gm/kg (1.2gm/kg)  Fluid Requirements (mL): 1300mL  Temp: 99.6 °F (37.6 °C)       Enteral Nutrition    Patient not receiving enteral nutrition at this time.    Parenteral Nutrition    Standard Formula: Clinimix 4.25/5  Custom Formula: not applicable  Additives: none  Rate/Volume: 65 ml/hr  Lipids: none  Total Nutrition Provided by Parenteral Nutrition:  Calories Provided  530 kcal/d, 40% needs   Protein Provided  66 g/d, 115% needs   Dextrose Provided  78 g/d   Fluid Provided  1560 ml/d, 100% needs       Evaluation of Received Nutrient Intake    Calories: not meeting estimated needs  Protein: meeting estimated needs    Patient Education    Not applicable.    Nutrition Diagnosis     PES: Malnutrition related to stroke as evidenced by <75% estimated energy needs, physical evidence of mild muscle/fat wasting. " (continues)    Interventions/Goals     Intervention(s): modified composition of meals/snacks, modified composition of enteral nutrition, commercial beverage, and collaboration with other providers  Goal: Meet greater than 75% of nutritional needs by follow-up. (goal not met)    Monitoring & Evaluation     Dietitian will monitor food and beverage intake, enteral nutrition intake, and weight change.  Nutrition Risk/Follow-Up: high (follow-up in 1-4 days)

## 2022-10-25 NOTE — CONSULTS
"ConsultsPatient Name: Daina Kinsey   MRN: [id number]   Admission Date: 10/7/2022   Hospital Length of Stay: 18   Attending Provider: Lluvia Alberto MD   Consulting Provider: Sal Combs M.D.  Reason for Consult: Goals of Care  Primary Care Physician: Aric Machado MD     Principal Problem: Stroke     Patient information was obtained from relative(s) and ER records.      Final diagnoses:  [I63.9] Stroke  [R29.90] Stroke-like symptoms (Primary)  [R47.01] Aphasia     Assessment/Plan:     I reviewed the patient's current clinical status with the nurse . I reviewed clinical documentation, labs and imaging. I met with the patient after learning that her daughter, Leeanne had called to inform nurses about their decision to place a PEG. The patient was more alert today.  She was fighting nurses and trying to refuse labs. I explained to her that a PEG was to be placed in the AM. She said, "I don't want that." I explained to her that she has not eaten enough and her family elected this on her behalf. She said, "Oh well, ok."  I contacted Leeanne and learned that the patient's family met and elected placement of a feeding tube. She asked me questions about how tube feedings should be managed at home. I explained to her about bolus feeds and how to administer. I told her that nurses will educate her before discharge.     I asked if it was still family's intention to send her to a skilled nursing facility in [place]. She and her brother were on the phone and they said, yes. They asked when she would be sent. I told them likely Friday or Monday, when she is felt to be tolerating her PEG feeds. I explained that the patient is still able to eat by mouth and that PEG feeds can be a supplement at some point rather than a primary source of her food.  They asked when she would be able to pull out the tube and start eating by mouth. I told them that it is my impression, that with this patient's history of advanced dementia with " recurrent CVA and bed bound state, I do not believe that she will be able to effectively stop PEG feedings (unless they elect to allow her to eat only by mouth for comfort) nor will she likely improve her functional status beyond her bed-bound state despite skilled rehab. I reminded them that her condition is advanced and progressive in nature. For her comfort, I suggested transferring her home to avoid further changes in environment which may only serve to further confuse her and further complicate her dementia. However, if they wish to proceed with skilled rehab, we will support their decision.    History of Present Illness:     82 year old female with history of recurrent CVA. She is now admitted with another CVA in PCA and MCA and Left frontal lobe. She also has had aspiration pna requiring antibiotics. She has baseline dementia and has had agitation during admit. I was consulted to review goals of care with family.      Active Ambulatory Problems     Diagnosis Date Noted    TIA (transient ischemic attack) 01/29/2022     Resolved Ambulatory Problems     Diagnosis Date Noted    No Resolved Ambulatory Problems     Past Medical History:   Diagnosis Date    Arthritis     Atherosclerosis     Cataracts, bilateral     COPD (chronic obstructive pulmonary disease)     Gallstones     HTN (hypertension)     PAD (peripheral artery disease)     Seizure     Stomach ulcer     Stroke     Vitamin D deficiency         Past Surgical History:   Procedure Laterality Date    CREATION OF FEMORAL-TIBIAL ARTERY BYPASS      INFERIOR VENA CAVA ANGIOPLASTY / STENTING      LEG AMPUTATION THROUGH KNEE      right    REPAIR OF CAROTID ARTERY      TOE AMPUTATION      left great toe        Review of patient's allergies indicates:   Allergen Reactions    Cephalexin Hives and Itching    Penicillins           Current Facility-Administered Medications:     acetaminophen tablet 1,000 mg, 1,000 mg, Oral, Q6H PRN, Samaria Bermeo MD, 1,000 mg at 10/22/22  2303    acetaminophen tablet 650 mg, 650 mg, Oral, Q4H PRN, Samaria Bermeo MD, 650 mg at 10/12/22 1532    aluminum-magnesium hydroxide-simethicone 200-200-20 mg/5 mL suspension 30 mL, 30 mL, Oral, QID PRN, Samaria Bermeo MD    amino acid 4.25% - dextrose 5% solution, , Intravenous, Continuous, Brennon Naik MD, Last Rate: 65 mL/hr at 10/24/22 2245, New Bag at 10/24/22 2245    apixaban tablet 2.5 mg, 2.5 mg, Oral, BID, Summer Ga MD    atorvastatin tablet 40 mg, 40 mg, Oral, Daily, Lluvia Alberto MD, 40 mg at 10/24/22 0932    cloNIDine 0.1 mg/24 hr td ptwk 1 patch, 1 patch, Transdermal, Q7 Days, Summer Ga MD, 1 patch at 10/20/22 1010    dextrose 10% bolus 125 mL, 12.5 g, Intravenous, PRN, RHODA Smith, Stopped at 10/18/22 1640    dextrose 10% bolus 250 mL, 25 g, Intravenous, PRN, Cecilia [address], RHODA    diltiaZEM 24 hr capsule 120 mg, 120 mg, Oral, Daily, Lluvia Alberto MD, 120 mg at 10/24/22 0932    famotidine (PF) injection 20 mg, 20 mg, Intravenous, Daily, Samaria Bermeo MD, 20 mg at 10/25/22 0900    glucagon (human recombinant) injection 1 mg, 1 mg, Intramuscular, PRN, Cecilia [address], RHODA    hydrALAZINE injection 10 mg, 10 mg, Intravenous, Q4H PRN, Samaria Bermeo MD, 10 mg at 10/22/22 2136    hydrALAZINE tablet 75 mg, 75 mg, Oral, Q8H, Alfie Santiago MD, 75 mg at 10/24/22 2118    insulin aspart U-100 injection 0-5 Units, 0-5 Units, Subcutaneous, Q6H PRN, RHODA Smith    isosorbide mononitrate 24 hr tablet 120 mg, 120 mg, Oral, Daily, Lluvia Alberto MD, 120 mg at 10/24/22 0900    labetaloL injection 10 mg, 10 mg, Intravenous, Q4H PRN, Samaria Bermeo MD, 10 mg at 10/21/22 1514    labetaloL injection 5 mg, 5 mg, Intravenous, Q2H PRN, Lluvia Alberto MD, 5 mg at 10/15/22 0017    levETIRAcetam (KEPPRA) 500 mg in dextrose 5 % in water (D5W) 5 % 100 mL IVPB (MB+), 500 mg, Intravenous, Q12H, BILL SmithP, Stopped at 10/25/22 0928    lisinopriL tablet 20 mg, 20 mg, Oral, Daily, Lluvia Alberto MD,  "20 mg at 10/24/22 0933    melatonin tablet 6 mg, 6 mg, Oral, Nightly PRN, Samaria Bermeo MD, 6 mg at 10/22/22 2304    metoprolol tartrate (LOPRESSOR) tablet 25 mg, 25 mg, Oral, BID, Summer Ga MD, 25 mg at 10/24/22 2118    montelukast tablet 10 mg, 10 mg, Oral, Daily, Lluvia Alberto MD, 10 mg at 10/24/22 0932    nicotine 21 mg/24 hr 1 patch, 1 patch, Transdermal, Daily, Lluvia Alberto MD, 1 patch at 10/25/22 0905    ondansetron injection 4 mg, 4 mg, Intravenous, Q4H PRN, Samaria Bermeo MD, 4 mg at 10/09/22 2145    polyethylene glycol packet 17 g, 17 g, Oral, BID PRN, Samaria Bermeo MD    prochlorperazine injection Soln 5 mg, 5 mg, Intravenous, Q6H PRN, Samaria Bermeo MD    senna-docusate 8.6-50 mg per tablet 1 tablet, 1 tablet, Oral, BID PRN, Samaria Bermeo MD    simethicone chewable tablet 80 mg, 1 tablet, Oral, QID PRN, Samaria Bermeo MD    sodium chloride 0.9% flush 10 mL, 10 mL, Intravenous, PRN, Brennon GREENWOOD Rodriguez, DO    sodium chloride 0.9% flush 10 mL, 10 mL, Intravenous, PRN, Summer Ga MD    Flushing PICC Protocol, , , Until Discontinued **AND** sodium chloride 0.9% flush 10 mL, 10 mL, Intravenous, Q6H, 10 mL at 10/25/22 0534 **AND** sodium chloride 0.9% flush 10 mL, 10 mL, Intravenous, PRN, Summer Ga MD     acetaminophen, acetaminophen, aluminum-magnesium hydroxide-simethicone, dextrose 10%, dextrose 10%, glucagon (human recombinant), hydrALAZINE, insulin aspart U-100, labetalol, labetaloL, melatonin, ondansetron, polyethylene glycol, prochlorperazine, senna-docusate 8.6-50 mg, simethicone, sodium chloride 0.9%, sodium chloride 0.9%, Flushing PICC Protocol **AND** sodium chloride 0.9% **AND** sodium chloride 0.9%     History reviewed. No pertinent family history.     Review of Systems   Unable to perform ROS: Dementia          Objective:   BP (!) 160/77   Pulse 84   Temp 98.4 °F (36.9 °C)   Resp 18   Ht 5' 0.98" (1.549 m)   Wt 47.6 kg (104 lb 15 oz)   SpO2 97%   Breastfeeding No   BMI 19.84 " kg/m²      Physical Exam  Vitals reviewed.   Constitutional:       General: She is not in acute distress.     Appearance: She is not ill-appearing or toxic-appearing.   HENT:      Head: Normocephalic.      Right Ear: External ear normal.      Left Ear: External ear normal.      Nose: Nose normal.      Mouth/Throat:      Mouth: Mucous membranes are moist.      Pharynx: Oropharynx is clear.   Eyes:      Conjunctiva/sclera: Conjunctivae normal.      Pupils: Pupils are equal, round, and reactive to light.   Cardiovascular:      Rate and Rhythm: Normal rate and regular rhythm.      Pulses: Normal pulses.      Heart sounds: Normal heart sounds.   Pulmonary:      Effort: Pulmonary effort is normal.      Breath sounds: Normal breath sounds.   Abdominal:      General: Abdomen is flat. Bowel sounds are normal. There is no distension.      Tenderness: There is no abdominal tenderness.   Skin:     General: Skin is warm.   Neurological:      Mental Status: She is alert. She is disoriented.      Cranial Nerves: Cranial nerve deficit present.      Motor: Weakness present.      Comments: Left hemiparesis   Psychiatric:         Mood and Affect: Mood normal.          Review of Symptoms  Review of Symptoms      Symptom Assessment (ESAS 0-10 Scale)  Pain:  0  Dyspnea:  0  Anxiety:  0  Nausea:  0  Depression:  0  Anorexia:  0  Fatigue:  0  Insomnia:  0  Restlessness:  0  Agitation:  0 due to Dementia     CAM / Delirium:  Negative  Constipation:  Negative  Diarrhea:  Negative      Bowel Management Plan (BMP):  Yes      Pain Assessment:  OME in 24 hours:  0  Location(s):      Pain Assessment in Advanced Demential Scale (PAINAD)   Breathing - Independent of vocalization:  0  Negative vocalization:  0  Facial expression:  0  Body language:  0  Consolability:  0  Total:  0    Modified Laura Scale:  0    Performance Status:  30    Living Arrangements:  Lives in home and Lives with family    Spiritual:  F - Makayla and Belief:  [employer] of [place]      Time-Based Charting:  Yes    Total Time Spent: 0 minutes    Advance Care Planning   Advance Directives:   Living Will: No    [place]POST: No    Do Not Resuscitate Status: No    Medical Power of : No    Agent's Name:  Mague Sherman   Agent's Contact Number:  [phone]    Decision Making:  Family answered questions and Patient unable to communicate due to disease severity/cognitive impairment  Goals of Care: What is most important right now is to focus on curative/life-prolongation (regardless of treatment burdens). Accordingly, we have decided that the best plan to meet the patient's goals includes continuing with treatment.          Caregiver burden formerly assessed: yes        > 50% of 40min of encounter was spent in chart review, face to face discussion of goals of care, symptom assessment, coordination of care and emotional support.       Sal Combs M.D.  Palliative Medicine  [facility]

## 2022-10-25 NOTE — CONSULTS
Gastroenterology Consultation Note    Reason for Consult:  Oropharyngeal dysphagia    PCP:   Aric Machado MD    Referring MD:Lluvia Alberto MD    History of Present Illness (HPI):   Patient is an 82-year-old  female unknown to our group with past medical history of multiple CVAs with residual left-sided weakness, left facial droop, dysarthria and baseline confusion- most recent CVA in February 2022.   GI was consulted for PEG placement for decreased oral intake due to oropharyngeal dysphagia.    First dose of Plavix and Eliquis was to be resumed this morning per Neurology, however per nurse patient spit out medication.     History difficult to obtain from patient due to confusion at baseline. No family at bedside.    Spoke with daughter, Leeanne Sherman:[phone]  Procedure and risks were discussed with all questions answered.           ROS:  Review of Systems   Unable to perform ROS: Medical condition     Medical History:   Past Medical History:   Diagnosis Date    Arthritis     Atherosclerosis     Cataracts, bilateral     COPD (chronic obstructive pulmonary disease)     Gallstones     HTN (hypertension)     PAD (peripheral artery disease)     Seizure     Stomach ulcer     Stroke     Vitamin D deficiency        Surgical History:   Past Surgical History:   Procedure Laterality Date    CREATION OF FEMORAL-TIBIAL ARTERY BYPASS      INFERIOR VENA CAVA ANGIOPLASTY / STENTING      LEG AMPUTATION THROUGH KNEE      right    REPAIR OF CAROTID ARTERY      TOE AMPUTATION      left great toe       Family History:   History reviewed. No pertinent family history..     Social History:   Social History     Tobacco Use    Smoking status: Every Day     Packs/day: 1.00     Types: Cigarettes    Smokeless tobacco: Never   Substance Use Topics    Alcohol use: Never       Allergies:  Review of patient's allergies indicates:   Allergen Reactions    Cephalexin Hives and Itching    Penicillins        Medications Prior to  "Admission   Medication Sig Dispense Refill Last Dose    atorvastatin (LIPITOR) 20 MG tablet Take 1 tablet by mouth once daily at 6am.       CARTIA  mg Cp24 Take 1 capsule by mouth once daily at 6am.       cholecalciferol, vitamin D3, 1,250 mcg (50,000 unit) capsule Take 1 capsule by mouth every Sunday.       clopidogreL (PLAVIX) 75 mg tablet Take 75 mg by mouth once daily.       cyproheptadine (PERIACTIN) 4 mg tablet        ELIQUIS 5 mg Tab Take 5 mg by mouth once daily. For 30 days       ergocalciferol (ERGOCALCIFEROL) 50,000 unit Cap Take 1 capsule by mouth once a week.       famotidine (PEPCID) 20 MG tablet Take 20 mg by mouth once daily.       hydrALAZINE (APRESOLINE) 50 MG tablet Take 50 mg by mouth 2 (two) times daily.       isosorbide mononitrate (IMDUR) 60 MG 24 hr tablet Take 120 mg by mouth once daily.       levETIRAcetam (KEPPRA) 250 MG Tab Take 1 tablet (250 mg total) by mouth 2 (two) times daily. 60 tablet 11     lisinopriL 10 MG tablet Take 10 mg by mouth once daily at 6am.       metoprolol succinate (TOPROL-XL) 50 MG 24 hr tablet Take 1 tablet by mouth once daily at 6am.       mirtazapine (REMERON) 15 MG tablet Take 15 mg by mouth nightly.       montelukast (SINGULAIR) 10 mg tablet Take 1 tablet by mouth once daily at 6am.       multivitamin/iron/folic acid (CENTRUM WOMEN ORAL) Take 1 tablet by mouth once daily at 6am.       nicotine (NICODERM CQ) 21 mg/24 hr Place onto the skin.       nitroGLYCERIN (NITROSTAT) 0.4 MG SL tablet as directed       saw palmetto 160 MG capsule Take 160 mg by mouth 2 (two) times daily.       senna-docusate 8.6-50 mg (PERICOLACE) 8.6-50 mg per tablet Take 1 tablet by mouth once daily.       traMADoL (ULTRAM) 50 mg tablet Take 50 mg by mouth 3 (three) times daily as needed.            Objective Findings:    Vital Signs:  BP (!) 160/77   Pulse 84   Temp 98.4 °F (36.9 °C)   Resp 18   Ht 5' 0.98" (1.549 m)   Wt 47.6 kg (104 lb 15 oz)   SpO2 97%   Breastfeeding No  "  BMI 19.84 kg/m²   Body mass index is 19.84 kg/m².    Physical Exam:  Physical Exam  Constitutional:       General: She is not in acute distress.     Appearance: Normal appearance. She is ill-appearing.   HENT:      Head: Normocephalic.      Nose: Nose normal.   Eyes:      General: No scleral icterus.     Extraocular Movements: Extraocular movements intact.   Cardiovascular:      Rate and Rhythm: Normal rate.      Pulses: Normal pulses.   Pulmonary:      Effort: Pulmonary effort is normal. No respiratory distress.      Breath sounds: No wheezing.   Abdominal:      General: Abdomen is flat. Bowel sounds are normal. There is no distension.      Palpations: Abdomen is soft. There is no mass.      Tenderness: There is no abdominal tenderness. There is no guarding.      Hernia: No hernia is present.      Comments: No abdominal scars   Musculoskeletal:      Cervical back: Normal range of motion.      Comments: Right above the knee amputation    Neurological:      Mental Status: Mental status is at baseline.   Psychiatric:         Behavior: Behavior normal.       Labs:  Recent Results (from the past 48 hour(s))   POCT glucose    Collection Time: 10/23/22  4:48 PM   Result Value Ref Range    POCT Glucose 145 (H) 70 - 110 mg/dL   POCT glucose    Collection Time: 10/23/22  7:11 PM   Result Value Ref Range    POCT Glucose 141 (H) 70 - 110 mg/dL   POCT glucose    Collection Time: 10/24/22  3:38 AM   Result Value Ref Range    POCT Glucose 127 (H) 70 - 110 mg/dL   POCT glucose    Collection Time: 10/24/22  6:26 PM   Result Value Ref Range    POCT Glucose 124 (H) 70 - 110 mg/dL   POCT glucose    Collection Time: 10/25/22 12:31 AM   Result Value Ref Range    POCT Glucose 125 (H) 70 - 110 mg/dL   POCT glucose    Collection Time: 10/25/22  5:32 AM   Result Value Ref Range    POCT Glucose 136 (H) 70 - 110 mg/dL   POCT glucose    Collection Time: 10/25/22 11:26 AM   Result Value Ref Range    POCT Glucose 120 (H) 70 - 110 mg/dL        CT Head Without Contrast   Final Result      CT Head Without Contrast   Final Result      MRI Brain Without Contrast   Final Result      1. Acute infarcts in the left PCA territory and right MCA territory, with punctate focus of acute ischemia in the left frontal lobe.   2. Curvilinear hemosiderin staining in the left occipital lobe consistent with petechial hemorrhage.   Finding given to the patient's RN, Leora at the time of dictation.         Electronically signed by: Ritika Combs   Date:    10/14/2022   Time:    10:11      CT Head Without Contrast   Final Result      Evolving ischemic changes in the left PCA territory without evidence of hemorrhagic transformation.         Electronically signed by: Ritika Combs   Date:    10/13/2022   Time:    09:56      Fl Modified Barium Swallow Speech   Final Result      CT Head Without Contrast   Final Result      No acute intracranial abnormality identified.  Findings of unchanged chronic microvascular ischemic disease.         Electronically signed by: Harley Arrington   Date:    10/10/2022   Time:    19:02      CTA Head and Neck (xpd)   Final Result      No abnormality of the intracranial vasculature.  No evidence for stenosis, occlusion, aneurysm, or thrombus.         Electronically signed by: Tucker Torres MD   Date:    10/09/2022   Time:    15:46      X-Ray Chest 1 View   Final Result      Reticulonodular opacities may be related to aspiration or vascular status.         Electronically signed by: Flor Bautista   Date:    10/09/2022   Time:    12:46      MRI BRAIN WITHOUT CONTRAST   Final Result      No definite acute intracranial abnormality.  Advanced generalized involutional change.  Extensive severe chronic microvascular white matter ischemic change, and multiple moderate-sized remote infarcts involving the left frontal lobe and right parietal and temporal lobes.         Electronically signed by: Danilo Schwartz MD   Date:    10/08/2022    Time:    10:22      CT Head Without Contrast   Final Result      Chronic age-related changes with old areas of ischemia in the MCA distributions bilaterally      No acute process         Electronically signed by: Meir Alberts   Date:    10/07/2022   Time:    14:51            Assessment/Plan:  1. Stroke-like symptoms    2. Stroke    3. Aphasia    4. Chest pain    5. Sinus tachycardia    6. Carotid artery stenosis    7. SVT (supraventricular tachycardia)         Patient is an 81yo AA female unknown to our group with PMHx of multiple CVAs with residual left-sided weakness, left facial droop, dysarthria and baseline confusion- most recent CVA in February 2022.   GI was consulted for PEG placement for decreased oral intake due to oropharyngeal dysphagia.     Oropharyngeal Dysphagia  - PEG tube placement tomorrow   - Family on board. Spoke with Leeanne Sherman, daughter: [phone]  - Hold Plavix and Eliquis for PEG placement  - CBC and CMP labs to be collected today  - INR lab draw in the morning       Diana Soriano PA-C  Gastroenterology  [facility]    Thank you for allowing us to participate in the care of Daina Kinsey.

## 2022-10-25 NOTE — PT/OT/SLP PROGRESS
Physical Therapy         Treatment        Daina Kinsey   MRN: [id number]     PT Received On: 10/25/22  PT Start Time: 948     PT Stop Time: 958    PT Total Time (min): 10 min       Billable Minutes:  Therapeutic Activity 10  Total Minutes: 10    Treatment Type: Treatment  PT/PTA: PTA     PTA Visit Number: 5       General Precautions: Standard, fall  Orthopedic Precautions: Orthopedic Precautions : N/A   Braces:      Spiritual, Cultural Beliefs, Restorationism Practices, Values that Affect Care: no      Objective:  Patient found in bed upon entry.    Functional Mobility:  Bed Mobility:   Supine to sit: Total Assistance   Sit to supine: Total Assistance   Rolling: Total Assistance   Scooting: Total Assistance    Balance:     Static Sit: Max A   Pt sat EOB for approx 5 min. Pt presents with posterior lean. Pt was able to follow commands to reach forward and inferior for PTAs hand. Pt then requested to return to supine.     Activity Tolerance:  Patient tolerated treatment well    Patient left HOB elevated with call button in reach.    Assessment:  Daina Kinsey is a 82 y.o. female with a medical diagnosis of Stroke.     Rehab potential is poor.    Activity tolerance: Poor    Discharge recommendations: Discharge Facility/Level of Care Needs: nursing facility, skilled     Equipment recommendations:       GOALS:   Multidisciplinary Problems       Physical Therapy Goals          Problem: Physical Therapy    Goal Priority Disciplines Outcome Goal Variances Interventions   Physical Therapy Goal     PT, PT/OT Ongoing, Progressing     Description: Goals to be met by: 2022     Patient will increase functional independence with mobility by performin. Supine to sit with Modified [place]  2. Sit to supine with Modified [place]  3. Sit to stand transfer with Contact Guard Assistance  4. Bed to chair transfer with Minimal Assistance using No Assistive Device  5. Wheelchair propulsion x500 feet with Stand-by  Assistance using bilateral uppper extremities                         PLAN:    Patient to be seen 3 x/week  to address the above listed problems via therapeutic activities, therapeutic exercises  Plan of Care expires: 11/08/22  Plan of Care reviewed with: patient         10/25/2022

## 2022-10-25 NOTE — PROGRESS NOTES
[facility] Hospital Medicine Progress Note        Chief Complaint: Inpatient follow-up on stroke    HPI:   Patient is an 82-year-old  female with past medical history of multiple CVAs with residual left-sided weakness, left facial droop, dysarthria and baseline confusion.  Latest CVA in February, 2022.  Bubble study negative.  Ultrasound carotid with less than 50% stenosis bilaterally.  No atrial fibrillation.  Patient was then discharged on Eliquis, Plavix, statin.  Aspirin was discontinued.  Patient presented back to the ED with new onset aphasia-expressive.  Patient was afebrile, hypertensive in the ED.  EKG-NSR.  CT head was negative.  Did confirm the old infarcts.  Patient was transferred to our hospital for Neurology evaluation.  Stroke protocol initiated.  Workup ordered.  Neurology consulted.  MRI negative for CVA.  Symptoms most likely secondary to dehydration, on IV hydration.  Family requesting skilled nursing facility placement.  Home meds resumed as appropriate.  Neurology recommended to continue Eliquis, Plavix.  Patient was agitated on 10/09, gave her a dose of Haldol following which she had worsening mentation with lethargic, drowsiness and hypotension, hypotension improved with IV fluids.  Holding all mood altering drugs.  Placed on IV fluids and kept NPO.  CT head, ABG non impressive.  Chest x-ray consistent with aspiration pneumonia, initiated appropriate antibiotics-Cipro and Flagyl.  Patient has allergy to penicillin.  Patient still lethargic and drowsy on 10/10.  Keeping NPO, on IV fluids.  Mentation much better on 10/11.  Speech cleared patient for dysphagia diet.      Interval Hx:   Patient seen and examined this morning no issues reported overnight by the nursing staff blood pressure and other vitals fairly stable still awaiting family decision        Objective/physical exam:  General:  Cachectic chronically ill-appearing  female  in no acute distress  HENT: normocephalic, atraumatic  Eye: PERRL, EOMI, clear conjunctiva  Neck: full ROM, no thyromegaly  Respiratory:  Diminished breath sounds bilaterally  Cardiovascular: regular rate and rhythm  Gastrointestinal: non-distended, positive bowel sounds, non-tender  Musculoskeletal:  Right above-the-knee amputation  Integumentary: warm, dry, intact, no rashes  Neurological: left facial droop, left hemiparesis  Psychiatric:  Demented with flat affect    VITAL SIGNS: 24 HRS MIN & MAX LAST   Temp  Min: 97.5 °F (36.4 °C)  Max: 98.9 °F (37.2 °C) 98.4 °F (36.9 °C)   BP  Min: 113/67  Max: 127/67 127/67   Pulse  Min: 85  Max: 91  89   Resp  Min: 18  Max: 18 18   SpO2  Min: 85 %  Max: 99 % (!) 94 %       Recent Labs   Lab 10/20/22  0838 10/21/22  0817   WBC 8.3 10.7   RBC 4.34 3.73*   HGB 12.8 11.5*   HCT 40.2 34.2*   MCV 92.6 91.7   MCH 29.5 30.8   MCHC 31.8* 33.6   RDW 14.3 14.2    296   MPV 9.2 9.6       Recent Labs   Lab 10/20/22  0838 10/21/22  0817 10/23/22  1107    134* 135*   K 3.2* 2.8* 2.5*   CO2 25 23 23   BUN 11.4 21.1* 28.7*   CREATININE 0.83 0.73 0.87   CALCIUM 9.6 9.3 9.8          Microbiology Results (last 7 days)       ** No results found for the last 168 hours. **             See below for Radiology    Scheduled Med:   apixaban  5 mg Oral BID    atorvastatin  40 mg Oral Daily    cloNIDine 0.1 mg/24 hr td ptwk  1 patch Transdermal Q7 Days    diltiaZEM  120 mg Oral Daily    famotidine (PF)  20 mg Intravenous Daily    hydrALAZINE  75 mg Oral Q8H    isosorbide mononitrate  120 mg Oral Daily    levetiracetam IV  500 mg Intravenous Q12H    lisinopriL  20 mg Oral Daily    metoprolol tartrate  25 mg Oral BID    montelukast  10 mg Oral Daily    nicotine  1 patch Transdermal Daily    sodium chloride 0.9%  10 mL Intravenous Q6H        Continuous Infusions:   amino acid 4.25% - dextrose 5% solution 65 mL/hr at 10/24/22 8963     None.    PRN Meds:  acetaminophen, acetaminophen,  aluminum-magnesium hydroxide-simethicone, dextrose 10%, dextrose 10%, glucagon (human recombinant), hydrALAZINE, insulin aspart U-100, labetalol, labetaloL, melatonin, ondansetron, polyethylene glycol, prochlorperazine, senna-docusate 8.6-50 mg, simethicone, sodium chloride 0.9%, sodium chloride 0.9%, Flushing PICC Protocol **AND** sodium chloride 0.9% **AND** sodium chloride 0.9%       Assessment/Plan:  Failure to thrive    Multifocal recent embolic ischemic infarctions with secondary petechial hemorrhage of the left occipital region  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  Aspiration pneumonitis   Oropharyngeal dysphagia  Vascular dementia with behavioral disturbance   Essential hypertension  Peripheral arterial disease status post right femoral-popliteal bypass, right above-the-knee amputation, and left iliac stenting  Coronary artery disease   Seizure disorder  Chronic obstructive pulmonary disease   Bilateral carotid artery stenosis  Anemia of chronic disease  Tobacco abuse      Plan:  Still no family decision, palliative care team is following too  Patient not eating much on Clinimix   Nursing staff  Ms. Corey discussed with neurology and they were okay resuming Plavix and Eliquis and that has been resumed  Continue with metoprolol  Continue physical therapy and occupational therapy  Patient completed Cipro and Flagyl for aspiration pneumonitis   Resume appropriate home medications  Continue with Clinimix for now   continue with PT and OT  case management is working on placement    VTE prophylaxis:  SCDs    Patient condition:  Stable but very poor long-term prognosis.      Anticipated discharge and Disposition:     Await skilled nursing facility placement    All diagnosis and differential diagnosis have been reviewed; assessment and plan has been documented; I have personally reviewed the labs and test results that are presently available; I have reviewed the patients medication  list; I have reviewed the consulting providers response and recommendations. I have reviewed or attempted to review medical records based upon their availability    All of the patient's questions have been  addressed and answered. Patient's is agreeable to the above stated plan. I will continue to monitor closely and make adjustments to medical management as needed.  _____________________________________________________________________    Nutrition Status:    Radiology:  CT Head Without Contrast  EXAMINATION  CT HEAD WITHOUT CONTRAST    CLINICAL HISTORY  to determine if Eliquis/Plavix can be resumed;    TECHNIQUE  Axial non-contrast CT images of the head were acquired and multiplanar reconstructions accomplished by a CT technologist at a separate workstation, pushed to PACS for physician review.    COMPARISON  15 October 2022    FINDINGS  Images were reviewed in subdural, brain, soft tissue, and bone windows.    Exam quality: Motion/streak artifact limits assessment of the posterior fossa.    Widespread changes of chronic white matter microvascular disease and the left frontal and right posterior parietal encephalomalacia are similar in the short interval.  No areas of new parenchymal attenuation abnormality are identified.  There is no evidence of interval acute intracranial hemorrhage.  No extra-axial fluid collection or midline shift is identified.  There is no discrete mass.    The visualized extracranial soft tissues and regional osseous structures are unchanged.    IMPRESSION  1. No convincing acute intracranial abnormality.  2. Additional secondary details discussed above, relatively unchanged from the 15 October 2022 appearance.    RADIATION DOSE  Automated tube current modulation, weight-based exposure dosing, and/or iterative reconstruction technique utilized to reach lowest reasonably achievable exposure rate.    DLP: 3079 mGy*cm    Electronically signed by: Connor  Mookie  Date:    10/20/2022  Time:    08:01      Summer Ga MD   10/25/2022

## 2022-10-26 NOTE — PT/OT/SLP PROGRESS
Occupational Therapy      Patient Name:  Daina Kinsey   MRN:  [id number]    Patient not seen today secondary to out for PEG tube placement. Will follow-up as schedule allows.    10/26/2022

## 2022-10-26 NOTE — PROVATION PATIENT INSTRUCTIONS
Discharge Summary/Instructions after an Endoscopic Procedure  Patient Name: Daina Kinsey  Patient MRN: [id number]  Patient YOB: 1940 Wednesday, October 26, 2022  Mateusz Talbert MD  Dear patient,  As a result of recent federal legislation (The Federal Cures Act), you may   receive lab or pathology results from your procedure in your [facility]   account before your physician is able to contact you. Your physician or   their representative will relay the results to you with their   recommendations at their soonest availability.  Thank you,  RESTRICTIONS:  During your procedure today, you received medications for sedation.  These   medications may affect your judgment, balance and coordination.  Therefore,   for 24 hours, you have the following restrictions:   - DO NOT drive a car, operate machinery, make legal/financial decisions,   sign important papers or drink alcohol.    ACTIVITY:  Today: no heavy lifting, straining or running due to procedural   sedation/anesthesia.  The following day: return to full activity including work.  DIET:  Eat and drink normally unless instructed otherwise.     TREATMENT FOR COMMON SIDE EFFECTS:  - Mild abdominal pain, nausea, belching, bloating or excessive gas:  rest,   eat lightly and use a heating pad.  - Sore Throat: treat with throat lozenges and/or gargle with warm salt   water.  - Because air was used during the procedure, expelling large amounts of air   from your rectum or belching is normal.  - If a bowel prep was taken, you may not have a bowel movement for 1-3 days.    This is normal.  SYMPTOMS TO WATCH FOR AND REPORT TO YOUR PHYSICIAN:  1. Abdominal pain or bloating, other than gas cramps.  2. Chest pain.  3. Back pain.  4. Signs of infection such as: chills or fever occurring within 24 hours   after the procedure.  5. Rectal bleeding, which would show as bright red, maroon, or black stools.   (A tablespoon of blood from the rectum is not serious, especially  if   hemorrhoids are present.)  6. Vomiting.  7. Weakness or dizziness.  GO DIRECTLY TO THE NEAREST EMERGENCY ROOM IF YOU HAVE ANY OF THE FOLLOWING:      Difficulty breathing              Chills and/or fever over 101 F   Persistent vomiting and/or vomiting blood   Severe abdominal pain   Severe chest pain   Black, tarry stools   Bleeding- more than one tablespoon   Any other symptom or condition that you feel may need urgent attention  Your doctor recommends these additional instructions:  If any biopsies were taken, your doctors clinic will contact you in 1 to 2   weeks with any results.  - Return patient to hospital [facility] for ongoing care.   - NPO.   - Please follow the post-PEG recommendations including: Nutrition consult   for formula and volume, advance food and medications per primary care   provider, external bolster 1 cm from abdominal wall, change dressing once   per day, may use PEG today for meds and water, may use PEG tomorrow for   feedings, antibiotic ointment to site and clean site with soap and water   daily and dry thoroughly.   - The findings and recommendations were discussed with the patient's   family.  For questions, problems or results please call your physician - Mateusz Talbert MD at Work:  [phone].  [facility], EMERGENCY ROOM PHONE NUMBER: [phone]  IF A COMPLICATION OR EMERGENCY SITUATION ARISES AND YOU ARE UNABLE TO REACH   YOUR PHYSICIAN - GO DIRECTLY TO THE EMERGENCY ROOM.  Mateusz Talbert MD  10/26/2022 11:52:03 AM  This report has been verified and signed electronically.  Dear patient,  As a result of recent federal legislation (The Federal Cures Act), you may   receive lab or pathology results from your procedure in your [facility]   account before your physician is able to contact you. Your physician or   their representative will relay the results to you with their   recommendations at their soonest availability.  Thank you,  PROVATION

## 2022-10-26 NOTE — PROGRESS NOTES
[facility]  Hospital Medicine Progress Note        Chief Complaint: Inpatient follow-up on stroke    HPI:   Patient is an 82-year-old  female with past medical history of multiple CVAs with residual left-sided weakness, left facial droop, dysarthria and baseline confusion.  Latest CVA in February, 2022.  Bubble study negative.  Ultrasound carotid with less than 50% stenosis bilaterally.  No atrial fibrillation.  Patient was then discharged on Eliquis, Plavix, statin.  Aspirin was discontinued.  Patient presented back to the ED with new onset aphasia-expressive.  Patient was afebrile, hypertensive in the ED.  EKG-NSR.  CT head was negative.  Did confirm the old infarcts.  Patient was transferred to our hospital for Neurology evaluation.  Stroke protocol initiated.  Workup ordered.  Neurology consulted.  MRI negative for CVA.  Symptoms most likely secondary to dehydration, on IV hydration.  Family requesting skilled nursing facility placement.  Home meds resumed as appropriate.  Neurology recommended to continue Eliquis, Plavix.  Patient was agitated on 10/09, gave her a dose of Haldol following which she had worsening mentation with lethargic, drowsiness and hypotension, hypotension improved with IV fluids.  Holding all mood altering drugs.  Placed on IV fluids and kept NPO.  CT head, ABG non impressive.  Chest x-ray consistent with aspiration pneumonia, initiated appropriate antibiotics-Cipro and Flagyl.  Patient has allergy to penicillin.  Patient still lethargic and drowsy on 10/10.  Keeping NPO, on IV fluids.  Mentation much better on 10/11.  Speech cleared patient for dysphagia diet.      Interval Hx:   Patient seen and examined this morning no issues reported plan for PEG tube placement today   Objective/physical exam:  General:  Cachectic chronically ill-appearing  female in no acute distress  HENT: normocephalic, atraumatic  Eye: PERRL, EOMI, clear  conjunctiva  Neck: full ROM, no thyromegaly  Respiratory:  Diminished breath sounds bilaterally  Cardiovascular: regular rate and rhythm  Gastrointestinal: non-distended, positive bowel sounds, non-tender  Musculoskeletal:  Right above-the-knee amputation  Integumentary: warm, dry, intact, no rashes  Neurological: left facial droop, left hemiparesis  Psychiatric:  Demented with flat affect    VITAL SIGNS: 24 HRS MIN & MAX LAST   Temp  Min: 97.2 °F (36.2 °C)  Max: 97.9 °F (36.6 °C) 97.2 °F (36.2 °C)   BP  Min: 110/65  Max: 175/83 (!) 175/83   Pulse  Min: 91  Max: 101  101   Resp  Min: 17  Max: 18 17   SpO2  Min: 97 %  Max: 98 % 98 %       Recent Labs   Lab 10/20/22  0838 10/21/22  0817 10/25/22  1500   WBC 8.3 10.7 8.2   RBC 4.34 3.73* 3.89*   HGB 12.8 11.5* 11.7*   HCT 40.2 34.2* 35.5*   MCV 92.6 91.7 91.3   MCH 29.5 30.8 30.1   MCHC 31.8* 33.6 33.0   RDW 14.3 14.2 14.2    296 275   MPV 9.2 9.6 9.8       Recent Labs   Lab 10/21/22  0817 10/23/22  1107 10/25/22  1500   * 135* 135*   K 2.8* 2.5* 3.1*   CO2 23 23 16*   BUN 21.1* 28.7* 27.5*   CREATININE 0.73 0.87 0.73   CALCIUM 9.3 9.8 9.7   ALBUMIN  --   --  2.8*   ALKPHOS  --   --  67   ALT  --   --  19   AST  --   --  37*   BILITOT  --   --  0.5          Microbiology Results (last 7 days)       ** No results found for the last 168 hours. **             See below for Radiology    Scheduled Med:   atorvastatin  40 mg Oral Daily    cloNIDine 0.1 mg/24 hr td ptwk  1 patch Transdermal Q7 Days    diltiaZEM  120 mg Oral Daily    famotidine (PF)  20 mg Intravenous Daily    hydrALAZINE  75 mg Oral Q8H    isosorbide mononitrate  120 mg Oral Daily    levetiracetam IV  500 mg Intravenous Q12H    LIDOcaine (PF) 20 mg/mL (2%)        lisinopriL  20 mg Oral Daily    metoprolol tartrate  25 mg Oral BID    montelukast  10 mg Oral Daily    nicotine  1 patch Transdermal Daily    phenylephrine HCl in 0.9% NaCl        sodium chloride 0.9%  10 mL Intravenous Q6H    vancomycin  (VANCOCIN) IVPB  1,000 mg Intravenous Once        Continuous Infusions:      None.    PRN Meds:  acetaminophen, acetaminophen, aluminum-magnesium hydroxide-simethicone, dextrose 10%, dextrose 10%, glucagon (human recombinant), hydrALAZINE, insulin aspart U-100, labetalol, labetaloL, levoFLOXacin, melatonin, ondansetron, polyethylene glycol, prochlorperazine, senna-docusate 8.6-50 mg, simethicone, sodium chloride 0.9%, sodium chloride 0.9%, [place] PICC Protocol **AND** sodium chloride 0.9% **AND** sodium chloride 0.9%, Pharmacy to dose Vancomycin consult **AND** vancomycin - pharmacy to dose       Assessment/Plan:  Failure to thrive    Multifocal recent embolic ischemic infarctions with secondary petechial hemorrhage of the left occipital region  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  Aspiration pneumonitis   Oropharyngeal dysphagia  Vascular dementia with behavioral disturbance   Essential hypertension  Peripheral arterial disease status post right femoral-popliteal bypass, right above-the-knee amputation, and left iliac stenting  Coronary artery disease   Seizure disorder  Chronic obstructive pulmonary disease   Bilateral carotid artery stenosis  Anemia of chronic disease  Tobacco abuse      Plan:  PEG tube placement today family was okay.  GI consulted  Will be started on feedings once okay to use PEG tube as per GI   Nursing staff  Ms. Corey discussed with neurology and they were okay resuming Plavix and Eliquis and that has been resumed  Continue with metoprolol  Continue physical therapy and occupational therapy  Patient completed Cipro and Flagyl for aspiration pneumonitis   Resume appropriate home medications  Continue with Clinimix for now   continue with PT and OT  case management is working on placement    VTE prophylaxis:  SCDs    Patient condition:  Stable but very poor long-term prognosis.      Anticipated discharge and Disposition:     Await skilled nursing facility  placement    All diagnosis and differential diagnosis have been reviewed; assessment and plan has been documented; I have personally reviewed the labs and test results that are presently available; I have reviewed the patients medication list; I have reviewed the consulting providers response and recommendations. I have reviewed or attempted to review medical records based upon their availability    All of the patient's questions have been  addressed and answered. Patient's is agreeable to the above stated plan. I will continue to monitor closely and make adjustments to medical management as needed.  _____________________________________________________________________    Nutrition Status:    Radiology:  CT Head Without Contrast  EXAMINATION  CT HEAD WITHOUT CONTRAST    CLINICAL HISTORY  to determine if Eliquis/Plavix can be resumed;    TECHNIQUE  Axial non-contrast CT images of the head were acquired and multiplanar reconstructions accomplished by a CT technologist at a separate workstation, pushed to PACS for physician review.    COMPARISON  15 October 2022    FINDINGS  Images were reviewed in subdural, brain, soft tissue, and bone windows.    Exam quality: Motion/streak artifact limits assessment of the posterior fossa.    Widespread changes of chronic white matter microvascular disease and the left frontal and right posterior parietal encephalomalacia are similar in the short interval.  No areas of new parenchymal attenuation abnormality are identified.  There is no evidence of interval acute intracranial hemorrhage.  No extra-axial fluid collection or midline shift is identified.  There is no discrete mass.    The visualized extracranial soft tissues and regional osseous structures are unchanged.    IMPRESSION  1. No convincing acute intracranial abnormality.  2. Additional secondary details discussed above, relatively unchanged from the 15 October 2022 appearance.    RADIATION DOSE  Automated tube current  modulation, weight-based exposure dosing, and/or iterative reconstruction technique utilized to reach lowest reasonably achievable exposure rate.    DLP: 3079 mGy*cm    Electronically signed by: Connor Damico  Date:    10/20/2022  Time:    08:01      Summer Ga MD   10/26/2022

## 2022-10-26 NOTE — PT/OT/SLP DISCHARGE
Physical Therapy Discharge Summary    Name: Daina Kinsey  MRN: [id number]   Principal Problem: Stroke     Patient Discharged from acute Physical Therapy on 10/26/22.  Please refer to prior PT noted date on 10/26/22 for functional status.     Assessment:     Patient is no longer making progress.    Objective:     GOALS:   Multidisciplinary Problems       Physical Therapy Goals          Problem: Physical Therapy    Goal Priority Disciplines Outcome Goal Variances Interventions   Physical Therapy Goal     PT, PT/OT Unable to Meet, Plan Revised     Description: Goals to be met by: 2022     Patient will increase functional independence with mobility by performin. Supine to sit with Modified [place]  2. Sit to supine with Modified [place]  3. Sit to stand transfer with Contact Guard Assistance  4. Bed to chair transfer with Minimal Assistance using No Assistive Device  5. Wheelchair propulsion x500 feet with Stand-by Assistance using bilateral uppper extremities                         Reasons for Discontinuation of Therapy Services  Patient is unable to continue work toward goals because of medical or psychosocial complications.      Plan:     Patient Discharged to: Palliative Care/Hospice.      10/26/2022

## 2022-10-26 NOTE — PLAN OF CARE
Problem: Physical Therapy  Goal: Physical Therapy Goal  Description: Goals to be met by: 2022     Patient will increase functional independence with mobility by performin. Supine to sit with Modified [place]  2. Sit to supine with Modified [place]  3. Sit to stand transfer with Contact Guard Assistance  4. Bed to chair transfer with Minimal Assistance using No Assistive Device  5. Wheelchair propulsion x500 feet with Stand-by Assistance using bilateral uppper extremities    Outcome: Unable to Meet

## 2022-10-26 NOTE — PT/OT/SLP RE-EVAL
Physical Therapy Re-evaluation    Patient Name:  Daina Kinsey   MRN:  [id number]    Recommendations:     Discharge Recommendations:  nursing facility, basic, nursing facility, skilled   Discharge Equipment Recommendations: none   Barriers to discharge: None    Assessment:     Daina Kinsey is a 82 y.o. female admitted with a medical diagnosis of Stroke.  She presents with the following impairments/functional limitations:  weakness, impaired endurance, impaired self care skills, impaired functional mobility, gait instability, impaired balance, decreased coordination, impaired cognition, decreased upper extremity function, decreased lower extremity function, decreased safety awareness. Patient has not progressed well with PT. Requiring total assistance for mobility. Cognitive status makes sessions difficult. Needs nursing home placement.     Rehab Prognosis:  Poor; patient would benefit from acute skilled PT services to address these deficits and reach maximum level of function.      Recent Surgery: Procedure(s) (LRB):  PEG (N/A)  EGD, WITH CLOSED BIOPSY Day of Surgery    Plan:     During this hospitalization, patient to be seen 3 x/week to address the above listed problems via therapeutic activities, therapeutic exercises  Plan of Care Expires:  11/08/22  Plan of Care Reviewed with: patient    Subjective     Communicated with nurse prior to session.  Patient found HOB elevated with telemetry upon PT entry to room, agreeable to evaluation.      Chief Complaint: none  Patient comments/goals: none  Pain/Comfort:  Pain Rating 1: 0/10    Patients cultural, spiritual, Muslim conflicts given the current situation: no      Objective:     Patient found with: telemetry     General Precautions: Standard, aspiration, fall   Orthopedic Precautions:N/A   Braces: N/A  Respiratory Status: Room air    Exams:  Cognitive Exam:  Patient is oriented to Person  RLE ROM: WFL  RLE Strength: Unable to accurately assess 2/2  confusion. Severe weakness present.   LLE ROM: WFL  LLE Strength: Unable to accurately assess 2/2 confusion. Severe weakness present.     Functional Mobility:  Bed Mobility:     Scooting: total assistance  Supine to Sit: total assistance  Sit to Supine: total assistance  Balance: Poor    AM-PAC 6 CLICK MOBILITY  Total Score:9     Patient left HOB elevated with all lines intact, call button in reach, and bed alarm on.    GOALS:   Multidisciplinary Problems       Physical Therapy Goals          Problem: Physical Therapy    Goal Priority Disciplines Outcome Goal Variances Interventions   Physical Therapy Goal     PT, PT/OT Unable to Meet, Plan Revised     Description: Goals to be met by: 2022     Patient will increase functional independence with mobility by performin. Supine to sit with Modified [place]  2. Sit to supine with Modified [place]  3. Sit to stand transfer with Contact Guard Assistance  4. Bed to chair transfer with Minimal Assistance using No Assistive Device  5. Wheelchair propulsion x500 feet with Stand-by Assistance using bilateral uppper extremities                         History:     Past Medical History:   Diagnosis Date    Arthritis     Atherosclerosis     Cataracts, bilateral     COPD (chronic obstructive pulmonary disease)     Gallstones     HTN (hypertension)     PAD (peripheral artery disease)     Seizure     Stomach ulcer     Stroke     Vitamin D deficiency        Past Surgical History:   Procedure Laterality Date    CREATION OF FEMORAL-TIBIAL ARTERY BYPASS      INFERIOR VENA CAVA ANGIOPLASTY / STENTING      LEG AMPUTATION THROUGH KNEE      right    REPAIR OF CAROTID ARTERY      TOE AMPUTATION      left great toe       Time Tracking:     PT Received On: 10/26/22  PT Start Time: 928     PT Stop Time: 944  PT Total Time (min): 16 min     Billable Minutes: Re-eval 16 minutes      10/26/2022

## 2022-10-26 NOTE — TRANSFER OF CARE
Anesthesia Transfer of Care Note    Patient: Daina Kinsey    Procedure(s) Performed: Procedure(s) (LRB):  PEG (N/A)    Patient location: GI    Anesthesia Type: general (with natural airway)    Transport from OR: Transported from OR on room air with adequate spontaneous ventilation    Post pain: adequate analgesia    Post assessment: no apparent anesthetic complications and tolerated procedure well    Post vital signs: stable    Level of consciousness: sedated    Nausea/Vomiting: no nausea/vomiting    Complications: none    Transfer of care protocol was followed      Last vitals:   133/98  97  18  100% on RA

## 2022-10-26 NOTE — ANESTHESIA PREPROCEDURE EVALUATION
"                                                                                                             10/26/2022  Daina Kinsey is a 82 y.o., female with recent stroke and resultatn oropharyngeal dysphagia.  For PEG tube placement today.       "History of Present Illness (HPI):   Patient is an 82-year-old  female unknown to our group with past medical history of multiple CVAs with residual left-sided weakness, left facial droop, dysarthria and baseline confusion- most recent CVA in February 2022.   GI was consulted for PEG placement for decreased oral intake due to oropharyngeal dysphagia.     First dose of Plavix and Eliquis was to be resumed this morning per Neurology, however per nurse patient spit out medication.      History difficult to obtain from patient due to confusion at baseline. No family at bedside."    "History of Present Illness:      82 year old female with history of recurrent CVA. She is now admitted with another CVA in PCA and MCA and Left frontal lobe. She also has had aspiration pna requiring antibiotics. She has baseline dementia and has had agitation during admit. I was consulted to review goals of care with family.             Active Ambulatory Problems     Diagnosis Date Noted    TIA (transient ischemic attack) 01/29/2022           Resolved Ambulatory Problems     Diagnosis Date Noted    No Resolved Ambulatory Problems           Past Medical History:   Diagnosis Date    Arthritis      Atherosclerosis      Cataracts, bilateral      COPD (chronic obstructive pulmonary disease)      Gallstones      HTN (hypertension)      PAD (peripheral artery disease)      Seizure      Stomach ulcer      Stroke      Vitamin D deficiency"              Pre-op Assessment    I have reviewed the Patient Summary Reports.     I have reviewed the Nursing Notes. I have reviewed the NPO Status.   I have reviewed the Medications.     Review of Systems  Anesthesia Hx:  No problems " with previous Anesthesia  Denies Family Hx of Anesthesia complications.   Denies Personal Hx of Anesthesia complications.   Cardiovascular:   Exercise tolerance: poor Hypertension    Pulmonary:   COPD    Hepatic/GI:   PUD,    Neurological:   TIA, CVA Seizures        Physical Exam  General: Cooperative, Alert and Confusion  Thin and frail  Airway:  Mallampati: II   Mouth Opening: Normal  TM Distance: Normal  Tongue: Normal  Neck ROM: Normal ROM    Dental:  Edentulous    Chest/Lungs:  Clear to auscultation, Normal Respiratory Rate    Heart:  Rate: Normal  Rhythm: Regular Rhythm        Anesthesia Plan  Type of Anesthesia, risks & benefits discussed:    Anesthesia Type: Gen Natural Airway  Intra-op Monitoring Plan: Standard ASA Monitors  Post Op Pain Control Plan: multimodal analgesia  Induction:  IV  Informed Consent: Informed consent signed with the Patient representative and all parties understand the risks and agree with anesthesia plan.  All questions answered.   ASA Score: 4  Day of Surgery Review of History & Physical: H&P Update referred to the surgeon/provider.    Ready For Surgery From Anesthesia Perspective.     .

## 2022-10-27 NOTE — PROGRESS NOTES
[facility] Hospital Medicine Progress Note        Chief Complaint: Inpatient follow-up on stroke    HPI:   Patient is an 82-year-old  female with past medical history of multiple CVAs with residual left-sided weakness, left facial droop, dysarthria and baseline confusion.  Latest CVA in February, 2022.  Bubble study negative.  Ultrasound carotid with less than 50% stenosis bilaterally.  No atrial fibrillation.  Patient was then discharged on Eliquis, Plavix, statin.  Aspirin was discontinued.  Patient presented back to the ED with new onset aphasia-expressive.  Patient was afebrile, hypertensive in the ED.  EKG-NSR.  CT head was negative.  Did confirm the old infarcts.  Patient was transferred to our hospital for Neurology evaluation.  Stroke protocol initiated.  Workup ordered.  Neurology consulted.  MRI negative for CVA.  Symptoms most likely secondary to dehydration, on IV hydration.  Family requesting skilled nursing facility placement.  Home meds resumed as appropriate.  Neurology recommended to continue Eliquis, Plavix.  Patient was agitated on 10/09, gave her a dose of Haldol following which she had worsening mentation with lethargic, drowsiness and hypotension, hypotension improved with IV fluids.  Holding all mood altering drugs.  Placed on IV fluids and kept NPO.  CT head, ABG non impressive.  Chest x-ray consistent with aspiration pneumonia, initiated appropriate antibiotics-Cipro and Flagyl.  Patient has allergy to penicillin.  Patient still lethargic and drowsy on 10/10.  Keeping NPO, on IV fluids.  Mentation much better on 10/11.  Speech cleared patient for dysphagia diet.      Interval Hx:   Patient seen and examined this morning no issues status post PEG tube placement on October 26th tolerating tube feedings at 20 cc an hour today       Objective/physical exam:  General:  Cachectic chronically ill-appearing  female in no acute  distress  HENT: normocephalic, atraumatic  Eye: PERRL, EOMI, clear conjunctiva  Neck: full ROM, no thyromegaly  Respiratory:  Diminished breath sounds bilaterally  Cardiovascular: regular rate and rhythm  Gastrointestinal: non-distended, positive bowel sounds, non-tender  Musculoskeletal:  Right above-the-knee amputation  Integumentary: warm, dry, intact, no rashes  Neurological: left facial droop, left hemiparesis  Psychiatric:  Demented with flat affect    VITAL SIGNS: 24 HRS MIN & MAX LAST   Temp  Min: 97.5 °F (36.4 °C)  Max: 99.3 °F (37.4 °C) 99.3 °F (37.4 °C)   BP  Min: 113/68  Max: 168/81 (!) 145/77   Pulse  Min: 87  Max: 112  94   Resp  Min: 16  Max: 24 18   SpO2  Min: 93 %  Max: 100 % (!) 93 %       Recent Labs   Lab 10/21/22  0817 10/25/22  1500   WBC 10.7 8.2   RBC 3.73* 3.89*   HGB 11.5* 11.7*   HCT 34.2* 35.5*   MCV 91.7 91.3   MCH 30.8 30.1   MCHC 33.6 33.0   RDW 14.2 14.2    275   MPV 9.6 9.8       Recent Labs   Lab 10/21/22  0817 10/23/22  1107 10/25/22  1500   * 135* 135*   K 2.8* 2.5* 3.1*   CO2 23 23 16*   BUN 21.1* 28.7* 27.5*   CREATININE 0.73 0.87 0.73   CALCIUM 9.3 9.8 9.7   ALBUMIN  --   --  2.8*   ALKPHOS  --   --  67   ALT  --   --  19   AST  --   --  37*   BILITOT  --   --  0.5          Microbiology Results (last 7 days)       ** No results found for the last 168 hours. **             See below for Radiology    Scheduled Med:   atorvastatin  40 mg Oral Daily    cloNIDine 0.1 mg/24 hr td ptwk  1 patch Transdermal Q7 Days    diltiaZEM  120 mg Oral Daily    famotidine (PF)  20 mg Intravenous Daily    hydrALAZINE  75 mg Oral Q8H    isosorbide mononitrate  120 mg Oral Daily    levetiracetam IV  500 mg Intravenous Q12H    lisinopriL  20 mg Oral Daily    metoprolol tartrate  25 mg Oral BID    montelukast  10 mg Oral Daily    nicotine  1 patch Transdermal Daily    sodium chloride 0.9%  10 mL Intravenous Q6H        Continuous Infusions:   amino acid 4.25% - dextrose 5% solution 65 mL/hr  at 10/26/22 1649       None.    PRN Meds:  acetaminophen, acetaminophen, aluminum-magnesium hydroxide-simethicone, dextrose 10%, dextrose 10%, glucagon (human recombinant), hydrALAZINE, insulin aspart U-100, labetalol, labetaloL, levoFLOXacin, melatonin, ondansetron, polyethylene glycol, prochlorperazine, senna-docusate 8.6-50 mg, simethicone, sodium chloride 0.9%, sodium chloride 0.9%, [place] PICC Protocol **AND** sodium chloride 0.9% **AND** sodium chloride 0.9%, Pharmacy to dose Vancomycin consult **AND** vancomycin - pharmacy to dose       Assessment/Plan:  Failure to thrive    Multifocal recent embolic ischemic infarctions with secondary petechial hemorrhage of the left occipital region  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  Aspiration pneumonitis   Oropharyngeal dysphagia  Vascular dementia with behavioral disturbance   Essential hypertension  Peripheral arterial disease status post right femoral-popliteal bypass, right above-the-knee amputation, and left iliac stenting  Coronary artery disease   Seizure disorder  Chronic obstructive pulmonary disease   Bilateral carotid artery stenosis  Anemia of chronic disease  Tobacco abuse      Plan:  Tolerating tube feedings at 20 cc an hour today, nutrition is consulted   We will continue to go up on the tube feedings up to the goal rate  I will discontinue Clinimix today   Nursing staff  MsKale Leora discussed with neurology and they were okay resuming Plavix and Eliquis and that has been resumed  Continue with metoprolol  Continue physical therapy and occupational therapy  Patient completed Cipro and Flagyl for aspiration pneumonitis   continue with PT and OT  case management is working on placement    VTE prophylaxis:  Eliquis  Patient condition:  Stable but very poor long-term prognosis.      Anticipated discharge and Disposition:     Await skilled nursing facility placement    All diagnosis and differential diagnosis have been  reviewed; assessment and plan has been documented; I have personally reviewed the labs and test results that are presently available; I have reviewed the patients medication list; I have reviewed the consulting providers response and recommendations. I have reviewed or attempted to review medical records based upon their availability    All of the patient's questions have been  addressed and answered. Patient's is agreeable to the above stated plan. I will continue to monitor closely and make adjustments to medical management as needed.  _____________________________________________________________________    Nutrition Status:    Radiology:  CT Head Without Contrast  EXAMINATION  CT HEAD WITHOUT CONTRAST    CLINICAL HISTORY  to determine if Eliquis/Plavix can be resumed;    TECHNIQUE  Axial non-contrast CT images of the head were acquired and multiplanar reconstructions accomplished by a CT technologist at a separate workstation, pushed to PACS for physician review.    COMPARISON  15 October 2022    FINDINGS  Images were reviewed in subdural, brain, soft tissue, and bone windows.    Exam quality: Motion/streak artifact limits assessment of the posterior fossa.    Widespread changes of chronic white matter microvascular disease and the left frontal and right posterior parietal encephalomalacia are similar in the short interval.  No areas of new parenchymal attenuation abnormality are identified.  There is no evidence of interval acute intracranial hemorrhage.  No extra-axial fluid collection or midline shift is identified.  There is no discrete mass.    The visualized extracranial soft tissues and regional osseous structures are unchanged.    IMPRESSION  1. No convincing acute intracranial abnormality.  2. Additional secondary details discussed above, relatively unchanged from the 15 October 2022 appearance.    RADIATION DOSE  Automated tube current modulation, weight-based exposure dosing, and/or iterative  reconstruction technique utilized to reach lowest reasonably achievable exposure rate.    DLP: 3079 mGy*cm    Electronically signed by: Connor Damico  Date:    10/20/2022  Time:    08:01      Summer Ga MD   10/27/2022

## 2022-10-27 NOTE — PLAN OF CARE
Problem: Adult Inpatient Plan of Care  Goal: Plan of Care Review  Outcome: Ongoing, Progressing  Goal: Patient-Specific Goal (Individualized)  Outcome: Ongoing, Progressing  Goal: Absence of Hospital-Acquired Illness or Injury  Outcome: Ongoing, Progressing  Goal: Optimal Comfort and Wellbeing  Outcome: Ongoing, Progressing  Goal: Readiness for Transition of Care  Outcome: Ongoing, Progressing     Problem: Adjustment to Illness (Stroke, Ischemic/Transient Ischemic Attack)  Goal: Optimal Coping  Outcome: Ongoing, Progressing     Problem: Bowel Elimination Impaired (Stroke, Ischemic/Transient Ischemic Attack)  Goal: Effective Bowel Elimination  Outcome: Ongoing, Progressing     Problem: Cerebral Tissue Perfusion (Stroke, Ischemic/Transient Ischemic Attack)  Goal: Optimal Cerebral Tissue Perfusion  Outcome: Ongoing, Progressing     Problem: Cognitive Impairment (Stroke, Ischemic/Transient Ischemic Attack)  Goal: Optimal Cognitive Function  Outcome: Ongoing, Progressing     Problem: Communication Impairment (Stroke, Ischemic/Transient Ischemic Attack)  Goal: Improved Communication Skills  Outcome: Ongoing, Progressing     Problem: Sensorimotor Impairment (Stroke, Ischemic/Transient Ischemic Attack)  Goal: Improved Sensorimotor Function  Outcome: Ongoing, Progressing     Problem: Swallowing Impairment (Stroke, Ischemic/Transient Ischemic Attack)  Goal: Optimal Eating and Swallowing without Aspiration  Outcome: Ongoing, Progressing     Problem: Urinary Elimination Impaired (Stroke, Ischemic/Transient Ischemic Attack)  Goal: Effective Urinary Elimination  Outcome: Ongoing, Progressing     Problem: Infection  Goal: Absence of Infection Signs and Symptoms  Outcome: Ongoing, Progressing     Problem: Skin Injury Risk Increased  Goal: Skin Health and Integrity  Outcome: Ongoing, Progressing     Problem: Impaired Wound Healing  Goal: Optimal Wound Healing  Outcome: Ongoing, Progressing     Problem: Coping Ineffective  Goal:  Effective Coping  Outcome: Ongoing, Progressing

## 2022-10-27 NOTE — PROGRESS NOTES
Gastroenterology Progress Note    Subjective/Interval History:  Initial Consult 10/25/22:  Patient is an 82-year-old  female unknown to our group with past medical history of multiple CVAs with residual left-sided weakness, left facial droop, dysarthria and baseline confusion- most recent CVA in February 2022.   GI was consulted for PEG placement for decreased oral intake due to oropharyngeal dysphagia.     First dose of Plavix and Eliquis was to be resumed this morning per Neurology, however per nurse patient spit out medication.      History difficult to obtain from patient due to confusion at baseline. No family at bedside.     Spoke with daughter, Leeanne Sherman:[phone]  Procedure and risks were discussed with all questions answered.     PEG placement 10/26/22:  Impression:            - Normal esophagus.                          - Non-bleeding gastric ulcers with a clean ulcer                          base (Roberto Class III).                          - Erythematous mucosa in the antrum. Biopsied.                          - Normal examined duodenum.                          - An externally removable PEG placement was                          successfully completed.     Recommendation:        - Return patient to hospital [facility] for ongoing care.                          - NPO.                          - Please follow the post-PEG recommendations                          including: Nutrition consult for formula and                          volume, advance food and medications per primary                          care provider, external bolster 1 cm from                          abdominal wall, change dressing once per day, may                          use PEG today for meds and water, may use PEG                          tomorrow for feedings, antibiotic ointment to site                          and clean site with soap and water daily and dry                          thoroughly.                           - The findings and recommendations were discussed                          with the patient's family.     Skin marking noted to be 2.5cm at the external bumper.    10/27/22:  H&H 11.7/35.5  Abdominal binder in place. Scant amount of dried blood at PEG site. Fresh gauze placed over external bumper. Skin marking noted to be 2.5cm at the external bumper.    ROS:  Review of Systems   Unable to perform ROS: Medical condition     Vital Signs:  BP (!) 145/77   Pulse 94   Temp 99.3 °F (37.4 °C) (Axillary)   Resp 18   Ht 5' (1.524 m)   Wt 47.2 kg (104 lb)   SpO2 (!) 93%   Breastfeeding No   BMI 20.31 kg/m²   Body mass index is 20.31 kg/m².    Physical Exam:  Physical Exam  Constitutional:       General: She is sleeping.   Cardiovascular:      Rate and Rhythm: Normal rate.   Pulmonary:      Effort: Pulmonary effort is normal. No respiratory distress.   Abdominal:      Palpations: Abdomen is soft.      Comments: Abdominal binder in place. Scant amount of dried blood at PEG site. Fresh gauze placed over external bumper. Skin marking noted to be 2.5cm at the external bumper.   Skin:     General: Skin is warm and dry.       Labs:  Recent Results (from the past 24 hour(s))   Specimen to Pathology    Collection Time: 10/26/22 12:00 PM   Result Value Ref Range    Case Report       Surgical Pathology                                Case: [id number]                                 Authorizing Provider:  Mateusz Talbert MD         Collected:           10/26/2022 12:00 PM          Ordering Location:     [facility]  Received:            10/26/2022 02:44 PM                                 - 9th Floor Med Surg                                                         Pathologist:           Benson Lux MD                                                              Specimen:    Antrum, Antrum Bx r/o H. Pylori                                                            Final Diagnosis       ANTRUM BIOPSY:    NO  "SIGNIFICANT PATHOLOGIC CHANGES (SEE MICROSCOPIC DESCRIPTION).    SPECIAL STAINS:  ALCIAN BLUE:  NEGATIVE FOR INTESTINAL METAPLASIA.  ALCIAN YELLOW:  NEGATIVE FOR HELICOBACTER LIKE ORGANISMS.  CONTROL STAINS ARE APPROPRIATE.    CODE 2       Clinical Information       Procedure:  PEG  EGD, WITH CLOSED BIOPSY  Pre-op Diagnosis:  R13.12 - Oropharyngeal dysphagia [ICD-10-CM]  Post-op Diagnosis:  R13.12 - Oropharyngeal dysphagia [ICD-10-CM]        Microscopic Description       Fragments of fundic type gastric mucosa exhibiting intact glandular architecture.  The lamina propria contains rare inflammatory cells (physiologic inflammation) consisting a occasional lymphocytes and plasma cells.  There is no evidence of acute inflammation.  Intestinal metaplasia and dysplastic changes are not seen.  No specific diagnostic findings.      Gross Description       1. Antrum, Antrum Bx r/o H. Pylori:   [id number], Daina Kinsey, antrum biopsy.    Labeled on the container "polina [place], [id number]".  Received in formalin are three tan tissue fragments ranging from 0.2 up to 0.4 cm.  Entirely submitted in cassette [id number].  0-3-1, Helico, Alcian Blue    RJ/bal      Report Footnotes       Unless the case is a "gross only" or additional testing only, the final diagnosis for each specimen is based on a microscopic examination of appropriate tissue sections.     POCT glucose    Collection Time: 10/26/22 10:38 PM   Result Value Ref Range    POCT Glucose 133 (H) 70 - 110 mg/dL   POCT glucose    Collection Time: 10/27/22  5:36 AM   Result Value Ref Range    POCT Glucose 142 (H) 70 - 110 mg/dL         Assessment/Plan:  1. Stroke-like symptoms    2. Stroke    3. Aphasia    4. Chest pain    5. Sinus tachycardia    6. Carotid artery stenosis    7. SVT (supraventricular tachycardia)          Patient is an 83yo AA female unknown to our group with PMHx of multiple CVAs with residual left-sided weakness, left facial droop, dysarthria and baseline " confusion- most recent CVA in February 2022.   GI was consulted for PEG placement for decreased oral intake due to oropharyngeal dysphagia.      Oropharyngeal Dysphagia  - PEG tube placed 10/26/22  - ok to continue Tfs and medications via PEG  - PEG site care instructions    GI will sign off. Please call with any questions.    Kim Wells PA-C acting as scribe for Mateusz Talbert MD  Gastroenterology  [facility]

## 2022-10-27 NOTE — PT/OT/SLP PROGRESS
Occupational Therapy   Treatment    Name: Daina Kinsey  MRN: [id number]  Admitting Diagnosis:  Stroke  1 Day Post-Op    Recommendations:     Discharge Recommendations: nursing facility, skilled, nursing facility, basic  Discharge Equipment Recommendations:  none  Barriers to discharge:  None    Assessment:     Daina Kinsey is a 82 y.o. female with a medical diagnosis of Stroke. Performance deficits affecting function are weakness, impaired endurance, impaired self care skills, impaired functional mobility, impaired balance, impaired cognition, decreased upper extremity function, decreased lower extremity function, decreased ROM, impaired joint extensibility, impaired muscle length, impaired skin.     Rehab Prognosis:  Fair; patient would benefit from acute skilled OT services to address these deficits and reach maximum level of function.       Plan:     Patient to be seen 3 x/week, 2 x/week to address the above listed problems via self-care/home management, therapeutic activities, therapeutic exercises  Plan of Care Expires: 11/02/22  Plan of Care Reviewed with: patient, daughter  Frequency of POC decreased to 2-3x/week due to patient's decreased tolerance of treatment.      Subjective     Pain/Comfort:  Pain Rating 1: 0/10    Objective:     Communicated with: Nrsg prior to session.  Patient found HOB elevated with telemetry, PEG Tube, PICC line, Purewick upon OT entry to room.    General Precautions: Standard, aspiration (BP<140)   Orthopedic Precautions:N/A   Braces: N/A  Respiratory Status: Room air     Occupational Performance:     Bed Mobility:    Patient completed Rolling/Turning to Left with  maximal assistance  Patient completed Rolling/Turning to Right with total assistance  Patient completed Scooting/Bridging with total assistance  Patient completed Supine to Sit with total assistance  Patient completed Sit to Supine with total assistance     Sitting Balance:   -   Total A with posterior  lean.    Functional Mobility/Transfers:  Unable.     Activities of Daily Living:  Lower Body Dressing: total assistance to kassandra sock  Toileting: total assistance at bed level for perineal care and change of brief      Treatment & Education:  Family educated on OT POC, verbalized understanding.     Patient left HOB elevated with all lines intact, call button in reach, and family present    GOALS:   Multidisciplinary Problems       Occupational Therapy Goals          Problem: Occupational Therapy    Goal Priority Disciplines Outcome Interventions   Occupational Therapy Goal     OT, PT/OT Ongoing, Progressing    Description: Goals to be met by: 10/25/2022     Patient will increase functional independence with ADLs by performing:    Feeding with Set-up Assistance.  UE Dressing with Moderate Assistance.  LE Dressing with Moderate Assistance.  Grooming while seated at sink with Minimal Assistance.  Toileting from bed level with Maximum Assistance for hygiene and clothing management.                          Time Tracking:     OT Date of Treatment: 10/27/22  OT Start Time: 1014  OT Stop Time: 1032  OT Total Time (min): 18 min    Billable Minutes:Self Care/Home Management 18 min    OT/XANDER: OT     XANDER Visit Number: 3    10/27/2022

## 2022-10-27 NOTE — PT/OT/SLP PROGRESS
Physical Therapy Treatment    Patient Name:  Daina Kinsey   MRN:  [id number]    Recommendations:     Discharge Recommendations:  nursing facility, basic   Discharge Equipment Recommendations: none   Barriers to discharge: None    Assessment:     Daina Kinsey is a 82 y.o. female admitted with a medical diagnosis of Stroke.  She presents with the following impairments/functional limitations:  weakness, impaired endurance, impaired self care skills, impaired functional mobility, gait instability, impaired balance, decreased coordination, impaired cognition, decreased upper extremity function, decreased lower extremity function, decreased safety awareness. Requiring total assist for rolling and supine<>sit. Dementia limiting functional gains. Needs nursing home placement.     Rehab Prognosis: Poor; patient would benefit from acute skilled PT services to address these deficits and reach maximum level of function.    Recent Surgery: Procedure(s) (LRB):  PEG (N/A)  EGD, WITH CLOSED BIOPSY 1 Day Post-Op    Plan:     During this hospitalization, patient to be seen 2 x/week to address the identified rehab impairments via gait training, therapeutic activities, therapeutic exercises, neuromuscular re-education and progress toward the following goals:    Plan of Care Expires:  11/08/22    Subjective     Chief Complaint: none  Patient/Family Comments/goals: none  Pain/Comfort:  Pain Rating 1: 0/10      Objective:     Communicated with nurse prior to session.  Patient found HOB elevated with telemetry, PEG Tube, PICC line upon PT entry to room.     General Precautions: Standard, aspiration, fall   Orthopedic Precautions:N/A   Braces: N/A  Respiratory Status: Room air     Functional Mobility:  Bed Mobility:     Rolling Left:  total assistance  Rolling Right: total assistance  Scooting: total assistance  Supine to Sit: total assistance  Sit to Supine: total assistance  Balance: Poor      AM-PAC 6 CLICK MOBILITY  Turning over  in bed (including adjusting bedclothes, sheets and blankets)?: 2  Sitting down on and standing up from a chair with arms (e.g., wheelchair, bedside commode, etc.): 1  Moving from lying on back to sitting on the side of the bed?: 2  Moving to and from a bed to a chair (including a wheelchair)?: 1  Need to walk in hospital room?: 1  Climbing 3-5 steps with a railing?: 1  Basic Mobility Total Score: 8     Patient left HOB elevated with all lines intact, call button in reach, and daughter present..    GOALS:   Multidisciplinary Problems       Physical Therapy Goals          Problem: Physical Therapy    Goal Priority Disciplines Outcome Goal Variances Interventions   Physical Therapy Goal     PT, PT/OT Unable to Meet, Plan Revised     Description: Goals to be met by: 2022     Patient will increase functional independence with mobility by performin. Supine to sit with Modified [place]  2. Sit to supine with Modified [place]  3. Sit to stand transfer with Contact Guard Assistance  4. Bed to chair transfer with Minimal Assistance using No Assistive Device  5. Wheelchair propulsion x500 feet with Stand-by Assistance using bilateral uppper extremities                         Time Tracking:     PT Received On: 10/27/22  PT Start Time: 928     PT Stop Time: 944  PT Total Time (min): 16 min     Billable Minutes: Therapeutic Activity 15 minutes    Treatment Type: Treatment  PT/PTA: PT     PTA Visit Number: 1     10/27/2022

## 2022-10-28 NOTE — PLAN OF CARE
Problem: Adult Inpatient Plan of Care  Goal: Plan of Care Review  Outcome: Ongoing, Progressing  Goal: Patient-Specific Goal (Individualized)  Outcome: Ongoing, Progressing  Goal: Absence of Hospital-Acquired Illness or Injury  Outcome: Ongoing, Progressing  Goal: Optimal Comfort and Wellbeing  Outcome: Ongoing, Progressing  Goal: Readiness for Transition of Care  Outcome: Ongoing, Progressing     Problem: Adjustment to Illness (Stroke, Ischemic/Transient Ischemic Attack)  Goal: Optimal Coping  Outcome: Ongoing, Progressing     Problem: Bowel Elimination Impaired (Stroke, Ischemic/Transient Ischemic Attack)  Goal: Effective Bowel Elimination  Outcome: Ongoing, Progressing     Problem: Cerebral Tissue Perfusion (Stroke, Ischemic/Transient Ischemic Attack)  Goal: Optimal Cerebral Tissue Perfusion  Outcome: Ongoing, Progressing     Problem: Cognitive Impairment (Stroke, Ischemic/Transient Ischemic Attack)  Goal: Optimal Cognitive Function  Outcome: Ongoing, Progressing     Problem: Communication Impairment (Stroke, Ischemic/Transient Ischemic Attack)  Goal: Improved Communication Skills  Outcome: Ongoing, Progressing     Problem: Functional Ability Impaired (Stroke, Ischemic/Transient Ischemic Attack)  Goal: Optimal Functional Ability  Outcome: Ongoing, Progressing     Problem: Sensorimotor Impairment (Stroke, Ischemic/Transient Ischemic Attack)  Goal: Improved Sensorimotor Function  Outcome: Ongoing, Progressing     Problem: Swallowing Impairment (Stroke, Ischemic/Transient Ischemic Attack)  Goal: Optimal Eating and Swallowing without Aspiration  Outcome: Ongoing, Progressing     Problem: Urinary Elimination Impaired (Stroke, Ischemic/Transient Ischemic Attack)  Goal: Effective Urinary Elimination  Outcome: Ongoing, Progressing     Problem: Infection  Goal: Absence of Infection Signs and Symptoms  Outcome: Ongoing, Progressing     Problem: Skin Injury Risk Increased  Goal: Skin Health and Integrity  Outcome:  Ongoing, Progressing     Problem: Coping Ineffective  Goal: Effective Coping  Outcome: Ongoing, Progressing     Problem: Impaired Wound Healing  Goal: Optimal Wound Healing  Outcome: Ongoing, Progressing     Problem: Fall Injury Risk  Goal: Absence of Fall and Fall-Related Injury  Outcome: Ongoing, Progressing

## 2022-10-28 NOTE — PROGRESS NOTES
[facility] Hospital Medicine Progress Note        Chief Complaint: Inpatient follow-up on stroke    HPI:   Patient is an 82-year-old  female with past medical history of multiple CVAs with residual left-sided weakness, left facial droop, dysarthria and baseline confusion.  Latest CVA in February, 2022.  Bubble study negative.  Ultrasound carotid with less than 50% stenosis bilaterally.  No atrial fibrillation.  Patient was then discharged on Eliquis, Plavix, statin.  Aspirin was discontinued.  Patient presented back to the ED with new onset aphasia-expressive.  Patient was afebrile, hypertensive in the ED.  EKG-NSR.  CT head was negative.  Did confirm the old infarcts.  Patient was transferred to our hospital for Neurology evaluation.  Stroke protocol initiated.  Workup ordered.  Neurology consulted.  MRI negative for CVA.  Symptoms most likely secondary to dehydration, on IV hydration.  Family requesting skilled nursing facility placement.  Home meds resumed as appropriate.  Neurology recommended to continue Eliquis, Plavix.  Patient was agitated on 10/09, gave her a dose of Haldol following which she had worsening mentation with lethargic, drowsiness and hypotension, hypotension improved with IV fluids.  Holding all mood altering drugs.  Placed on IV fluids and kept NPO.  CT head, ABG non impressive.  Chest x-ray consistent with aspiration pneumonia, initiated appropriate antibiotics-Cipro and Flagyl.  Patient has allergy to penicillin.  Patient still lethargic and drowsy on 10/10.  Keeping NPO, on IV fluids.  Mentation much better on 10/11.  Speech cleared patient for dysphagia diet.      Interval Hx:   Patient seen and examined this morning no issues status post PEG tube placement on October 26th daughter bedside tolerating tube feedings at 50 cc an hour     Objective/physical exam:  General:  Cachectic chronically ill-appearing  female in no acute  distress  HENT: normocephalic, atraumatic  Eye: PERRL, EOMI, clear conjunctiva  Neck: full ROM, no thyromegaly  Respiratory:  Diminished breath sounds bilaterally  Cardiovascular: regular rate and rhythm  Gastrointestinal: non-distended, positive bowel sounds, non-tender  Musculoskeletal:  Right above-the-knee amputation  Integumentary: warm, dry, intact, no rashes  Neurological: left facial droop, left hemiparesis  Psychiatric:  Demented with flat affect    VITAL SIGNS: 24 HRS MIN & MAX LAST   Temp  Min: 98.4 °F (36.9 °C)  Max: 99.3 °F (37.4 °C) 99.3 °F (37.4 °C)   BP  Min: 111/69  Max: 157/82 (!) 157/82   Pulse  Min: 94  Max: 105  100   Resp  Min: 16  Max: 18 16   SpO2  Min: 93 %  Max: 100 % 98 %       Recent Labs   Lab 10/25/22  1500   WBC 8.2   RBC 3.89*   HGB 11.7*   HCT 35.5*   MCV 91.3   MCH 30.1   MCHC 33.0   RDW 14.2      MPV 9.8       Recent Labs   Lab 10/23/22  1107 10/25/22  1500   * 135*   K 2.5* 3.1*   CO2 23 16*   BUN 28.7* 27.5*   CREATININE 0.87 0.73   CALCIUM 9.8 9.7   ALBUMIN  --  2.8*   ALKPHOS  --  67   ALT  --  19   AST  --  37*   BILITOT  --  0.5          Microbiology Results (last 7 days)       ** No results found for the last 168 hours. **             See below for Radiology    Scheduled Med:   apixaban  2.5 mg Per G Tube BID    atorvastatin  40 mg Oral Daily    cloNIDine 0.1 mg/24 hr td ptwk  1 patch Transdermal Q7 Days    clopidogreL  75 mg Per G Tube Daily    diltiaZEM  120 mg Oral Daily    famotidine (PF)  20 mg Intravenous Daily    hydrALAZINE  75 mg Oral Q8H    isosorbide mononitrate  120 mg Oral Daily    levetiracetam IV  500 mg Intravenous Q12H    lisinopriL  20 mg Oral Daily    metoprolol tartrate  25 mg Oral BID    montelukast  10 mg Oral Daily    nicotine  1 patch Transdermal Daily    sodium chloride 0.9%  10 mL Intravenous Q6H        Continuous Infusions:        None.    PRN Meds:  acetaminophen, acetaminophen, aluminum-magnesium hydroxide-simethicone, dextrose 10%,  dextrose 10%, glucagon (human recombinant), hydrALAZINE, insulin aspart U-100, labetalol, labetaloL, levoFLOXacin, melatonin, ondansetron, polyethylene glycol, prochlorperazine, senna-docusate 8.6-50 mg, simethicone, sodium chloride 0.9%, sodium chloride 0.9%, Flushing PICC Protocol **AND** sodium chloride 0.9% **AND** sodium chloride 0.9%, Pharmacy to dose Vancomycin consult **AND** vancomycin - pharmacy to dose       Assessment/Plan:  Failure to thrive    Multifocal recent embolic ischemic infarctions with secondary petechial hemorrhage of the left occipital region  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  Aspiration pneumonitis   Oropharyngeal dysphagia  Vascular dementia with behavioral disturbance   Essential hypertension  Peripheral arterial disease status post right femoral-popliteal bypass, right above-the-knee amputation, and left iliac stenting  Coronary artery disease   Seizure disorder  Chronic obstructive pulmonary disease   Bilateral carotid artery stenosis  Anemia of chronic disease  Tobacco abuse      Plan:  Tolerating tube feedings at 50 cc an hour goal rate is 55 cc an hour  Repeat blood work in a.m.  Clinimix was discontinued   Home dose of Eliquis and Plavix has been resumed  We have reconsulted physical therapy  Continue physical therapy and occupational therapy  Patient completed Cipro and Flagyl for aspiration pneumonitis   continue with PT and OT  case management is working on placement    VTE prophylaxis:  Eliquis  Patient condition:  Stable but very poor long-term prognosis.      Anticipated discharge and Disposition:     Await skilled nursing facility placement    All diagnosis and differential diagnosis have been reviewed; assessment and plan has been documented; I have personally reviewed the labs and test results that are presently available; I have reviewed the patients medication list; I have reviewed the consulting providers response and  recommendations. I have reviewed or attempted to review medical records based upon their availability    All of the patient's questions have been  addressed and answered. Patient's is agreeable to the above stated plan. I will continue to monitor closely and make adjustments to medical management as needed.  _____________________________________________________________________    Nutrition Status:    Radiology:  CT Head Without Contrast  EXAMINATION  CT HEAD WITHOUT CONTRAST    CLINICAL HISTORY  to determine if Eliquis/Plavix can be resumed;    TECHNIQUE  Axial non-contrast CT images of the head were acquired and multiplanar reconstructions accomplished by a CT technologist at a separate workstation, pushed to PACS for physician review.    COMPARISON  15 October 2022    FINDINGS  Images were reviewed in subdural, brain, soft tissue, and bone windows.    Exam quality: Motion/streak artifact limits assessment of the posterior fossa.    Widespread changes of chronic white matter microvascular disease and the left frontal and right posterior parietal encephalomalacia are similar in the short interval.  No areas of new parenchymal attenuation abnormality are identified.  There is no evidence of interval acute intracranial hemorrhage.  No extra-axial fluid collection or midline shift is identified.  There is no discrete mass.    The visualized extracranial soft tissues and regional osseous structures are unchanged.    IMPRESSION  1. No convincing acute intracranial abnormality.  2. Additional secondary details discussed above, relatively unchanged from the 15 October 2022 appearance.    RADIATION DOSE  Automated tube current modulation, weight-based exposure dosing, and/or iterative reconstruction technique utilized to reach lowest reasonably achievable exposure rate.    DLP: 3079 mGy*cm    Electronically signed by: Connor Damico  Date:    10/20/2022  Time:    08:01      Summer Ga MD   10/28/2022

## 2022-10-28 NOTE — PROGRESS NOTES
Inpatient Nutrition Assessment    Admit Date: 10/7/2022   Total duration of encounter: 21 days     Nutrition Recommendation/Prescription     - Check magnesium and phosphorus levels.   - Tube feeding: Isosource HN @ goal rate 55 mL/hr to provide** 1320 kcals, 65 g protein & meet 100% est nutritional needs. Initiate @ 10 ml/hr, increase by 10 ml/hr q 8 hours to goal rate.     If patient does not tolerate tube feeding upon slower progression, consider changing formula.   Can try Peptamen AF @ goal rate 55 ml/hr to provide** 1320 kcals (100% est. Needs), 84 g protein (100% estimated needs). Can add 110 ml flush q 4 hrs for a total fluid provision of 1550 ml fluid (100+% est needs) or per physician. Initiate @ 10 ml/hr, increase by 10 ml/hr q 8 hours to goal rate.     **All tf calculations are based on tube feeding running 20 hours per day.     Communication of Recommendations: reviewed with nurse    Nutrition Assessment     Malnutrition Assessment/Nutrition-Focused Physical Exam    Malnutrition in the context of acute illness or injury  Degree of Malnutrition: non-severe (moderate) malnutrition  Energy Intake: < 75% of estimated energy requirement for > 7 days  Interpretation of Weight Loss: does not meet criteria  Body Fat:mild depletion  Area of Body Fat Loss: orbital region  and upper arm region - triceps / biceps  Muscle Mass Loss: moderate depletion  Area of Muscle Mass Loss: clavicle bone region - pectoralis major, deltoid, trapezius muscles, dorsal hand - interosseous musle, and patellar region - quadricep muscle  Fluid Accumulation: does not meet criteria  Edema: not applicable   Reduced  Strength: unable to obtain  A minimum of two characteristics is recommended for diagnosis of either severe or non-severe malnutrition.    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, dysarthria, and left facial  droop  Bilateral carotid atherosclerotic disease  Probable vascular dementia  Hypertension      Relevant Medical History:   Multiple CVAs-  left-sided weakness, left facial droop, dysarthria, and confusion  Bilateral LONG  PAD - right fem-tib bypass, right AKA, left iliac stent  CAD-obstructive per [facility] 2013  Probable vascular dementia  Seizure  HTN  HLD  COPD  Osteoarthritis    Nutrition-Related Medications: Atorvastatin, Hydralazine, Lisinopril, Metoprolol, Nicotine, Insulin PRN PPN    Calorie Containing IV Medications: Clinimix (details below)     Nutrition-Related Labs:  10/8: Cl 110, GFR >60  10/13: H/H-10.3/31.4, K-2.7, Bun-9.2  10/16: K-3.1, Bun-4.5, Gluc-79  10/20: Glu 119, K 3.2   10/34: CBGs 127-145    Diet/PN Order: Diet Pureed Mildly Thick Liquids  Oral Supplement Order: Boost VHC  Tube Feeding Order: none at this time  Appetite/Oral Intake: poor/0-25% of meals  Factors Affecting Nutritional Intake: chewing difficulty, difficulty/impaired swallowing, and edentulous  Food/Mandaen/Cultural Preferences: none reported    Wound(s): none noted    Comments    10/8: Seen for unsure wt loss. Pt in bed with daughter at bedside. Noted SLP evaluated this am, rec'd minced and moist diet with mildly thick liquids. Pt has not attempted PO intake yet, will monitor tolerance. Per daughter, pt eats fruit, eggs, grits, red beans and rice at home - usually with good intake except for last week or so. Offered ONS VHC- pt receptive. No significant wt loss noted.    10/13: Pt only mumbling during visit. Son at bedside. Pt now NPO. SLP re-consulted. Pt has been refusing medications. Some confusion noted as well. Pt may benefit from Peg placement if unable to resume oral diet due to mental status.     10/17: Pt refusing oral intake. Nsg stated had a hard time getting pt to take oral medications. Tongue is also difficult to move around for intake. If pt continues to refuse, may benefit from Peg placement and begin Tube feeds for  primary source of nutrition.     10/20: Pt refusing most food and drink, has been started on PPN. Palliative discussions underway.      10/24: Pt continues to refuse oral meals. When she does eat, she spits out the food. Family is considering Peg placement. Will await decision.     10/28: Tube feeding was started yesterday, pt vomited today and tf was stopped. Tube feedings currently on hold. Plan is to give nausea meds, then start tf @ 10 ml/hr w/ slow progression. I will make recs for alternative formula in case patient continues to not tolerate.     Anthropometrics    Height: 5' (152.4 cm) Height Method: Stated  Last Weight: 47.2 kg (104 lb) (10/26/22 1053) Weight Method: Bed Scale  BMI (Calculated): 20.3-Not appropriate due to Rt BKA  BMI Classification: normal (BMI 18.5-24.9)     Ideal Body Weight (IBW), Female: 100 lb     % Ideal Body Weight, Female (lb): 104 %        Usual Body Weight (UBW), k kg (98-105lb)  % Usual Body Weight: 103.69  % Weight Change From Usual Weight: 3.48 %  Usual Weight Provided By: family/caregiver    Wt Readings from Last 5 Encounters:   10/26/22 47.2 kg (104 lb)   22 47.6 kg (105 lb)   22 45.4 kg (100 lb)   22 46.3 kg (102 lb)   19 46.6 kg (102 lb 11.8 oz)     Weight Change(s) Since Admission:   Admit Weight: 47.6 kg (105 lb) (10/07/22 1421)  10/13: 47.6kg  10/17: 47.6kg  10/24: no new wt    Estimated Needs    Weight Used For Calorie Calculations: 47.6 kg (104 lb 15 oz)  Energy Calorie Requirements (kcal): 1316kcals (28kcal/kg)  Energy Need Method: Kcal/kg  Weight Used For Protein Calculations: 47.6 kg (104 lb 15 oz)  Protein Requirements: 57gm/kg (1.2gm/kg)  Fluid Requirements (mL): 1300mL  Temp: 98.9 °F (37.2 °C)       Enteral Nutrition    Patient not receiving enteral nutrition at this time.    Parenteral Nutrition    Not receiving parenteral nutrition at this time.       Evaluation of Received Nutrient Intake    Calories: not meeting estimated  needs  Protein: not meeting estimated needs    Patient Education    Not applicable.    Nutrition Diagnosis     PES: Malnutrition related to stroke as evidenced by <75% estimated energy needs, physical evidence of mild muscle/fat wasting. (continues)    Interventions/Goals     Intervention(s): modified composition of meals/snacks, modified composition of enteral nutrition, commercial beverage, and collaboration with other providers  Goal: Meet greater than 75% of nutritional needs by follow-up. (goal not met)    Monitoring & Evaluation     Dietitian will monitor food and beverage intake, enteral nutrition intake, and weight change.  Nutrition Risk/Follow-Up: high (follow-up in 1-4 days)

## 2022-10-29 NOTE — PLAN OF CARE
Problem: Adult Inpatient Plan of Care  Goal: Plan of Care Review  Outcome: Ongoing, Not Progressing  Goal: Patient-Specific Goal (Individualized)  Outcome: Ongoing, Not Progressing  Goal: Absence of Hospital-Acquired Illness or Injury  Outcome: Ongoing, Not Progressing  Goal: Optimal Comfort and Wellbeing  Outcome: Ongoing, Not Progressing  Goal: Readiness for Transition of Care  Outcome: Ongoing, Not Progressing     Problem: Adjustment to Illness (Stroke, Ischemic/Transient Ischemic Attack)  Goal: Optimal Coping  Outcome: Ongoing, Not Progressing     Problem: Bowel Elimination Impaired (Stroke, Ischemic/Transient Ischemic Attack)  Goal: Effective Bowel Elimination  Outcome: Ongoing, Not Progressing     Problem: Cerebral Tissue Perfusion (Stroke, Ischemic/Transient Ischemic Attack)  Goal: Optimal Cerebral Tissue Perfusion  Outcome: Ongoing, Not Progressing     Problem: Cognitive Impairment (Stroke, Ischemic/Transient Ischemic Attack)  Goal: Optimal Cognitive Function  Outcome: Ongoing, Not Progressing     Problem: Communication Impairment (Stroke, Ischemic/Transient Ischemic Attack)  Goal: Improved Communication Skills  Outcome: Ongoing, Not Progressing     Problem: Functional Ability Impaired (Stroke, Ischemic/Transient Ischemic Attack)  Goal: Optimal Functional Ability  Outcome: Ongoing, Not Progressing     Problem: Sensorimotor Impairment (Stroke, Ischemic/Transient Ischemic Attack)  Goal: Improved Sensorimotor Function  Outcome: Ongoing, Not Progressing     Problem: Swallowing Impairment (Stroke, Ischemic/Transient Ischemic Attack)  Goal: Optimal Eating and Swallowing without Aspiration  Outcome: Ongoing, Not Progressing     Problem: Urinary Elimination Impaired (Stroke, Ischemic/Transient Ischemic Attack)  Goal: Effective Urinary Elimination  Outcome: Ongoing, Not Progressing     Problem: Infection  Goal: Absence of Infection Signs and Symptoms  Outcome: Ongoing, Not Progressing     Problem: Skin Injury Risk  Increased  Goal: Skin Health and Integrity  Outcome: Ongoing, Not Progressing     Problem: Coping Ineffective  Goal: Effective Coping  Outcome: Ongoing, Not Progressing     Problem: Impaired Wound Healing  Goal: Optimal Wound Healing  Outcome: Ongoing, Not Progressing     Problem: Fall Injury Risk  Goal: Absence of Fall and Fall-Related Injury  Outcome: Ongoing, Not Progressing

## 2022-10-29 NOTE — PROGRESS NOTES
[facility] Hospital Medicine Progress Note        Chief Complaint: Inpatient follow-up on stroke    HPI:   Patient is an 82-year-old  female with past medical history of multiple CVAs with residual left-sided weakness, left facial droop, dysarthria and baseline confusion.  Latest CVA in February, 2022.  Bubble study negative.  Ultrasound carotid with less than 50% stenosis bilaterally.  No atrial fibrillation.  Patient was then discharged on Eliquis, Plavix, statin.  Aspirin was discontinued.  Patient presented back to the ED with new onset aphasia-expressive.  Patient was afebrile, hypertensive in the ED.  EKG-NSR.  CT head was negative.  Did confirm the old infarcts.  Patient was transferred to our hospital for Neurology evaluation.  Stroke protocol initiated.  Workup ordered.  Neurology consulted.  MRI negative for CVA.  Symptoms most likely secondary to dehydration, on IV hydration.  Family requesting skilled nursing facility placement.  Home meds resumed as appropriate.  Neurology recommended to continue Eliquis, Plavix.  Patient was agitated on 10/09, gave her a dose of Haldol following which she had worsening mentation with lethargic, drowsiness and hypotension, hypotension improved with IV fluids.  Holding all mood altering drugs.  Placed on IV fluids and kept NPO.  CT head, ABG non impressive.  Chest x-ray consistent with aspiration pneumonia, initiated appropriate antibiotics-Cipro and Flagyl.  Patient has allergy to penicillin.  Patient still lethargic and drowsy on 10/10.  Keeping NPO, on IV fluids.  Mentation much better on 10/11.  Speech cleared patient for dysphagia diet.      Interval Hx:  Patient seen and examined with daughter bedside she could not tolerate tube feedings yesterday So that was stopped and   Objective/physical exam:  General:  Cachectic chronically ill-appearing  female in no acute distress  HENT: normocephalic,  atraumatic  Eye: PERRL, EOMI, clear conjunctiva  Neck: full ROM, no thyromegaly  Respiratory:  Diminished breath sounds bilaterally  Cardiovascular: regular rate and rhythm  Gastrointestinal: non-distended, positive bowel sounds, non-tender  Musculoskeletal:  Right above-the-knee amputation  Integumentary: warm, dry, intact, no rashes  Neurological: left facial droop, left hemiparesis  Psychiatric:  Demented with flat affect    VITAL SIGNS: 24 HRS MIN & MAX LAST   Temp  Min: 97.9 °F (36.6 °C)  Max: 99 °F (37.2 °C) 98.5 °F (36.9 °C)   BP  Min: 112/73  Max: 156/81 124/72   Pulse  Min: 75  Max: 125  75   Resp  Min: 16  Max: 20 18   SpO2  Min: 93 %  Max: 98 % 95 %       Recent Labs   Lab 10/25/22  1500 10/29/22  0524   WBC 8.2 17.1*   RBC 3.89* 3.64*   HGB 11.7* 11.0*   HCT 35.5* 32.7*   MCV 91.3 89.8   MCH 30.1 30.2   MCHC 33.0 33.6   RDW 14.2 14.0    294   MPV 9.8 10.0       Recent Labs   Lab 10/23/22  1107 10/25/22  1500 10/29/22  0524   * 135* 139   K 2.5* 3.1* 2.8*   CO2 23 16* 23   BUN 28.7* 27.5* 28.3*   CREATININE 0.87 0.73 0.89   CALCIUM 9.8 9.7 10.5*   ALBUMIN  --  2.8*  --    ALKPHOS  --  67  --    ALT  --  19  --    AST  --  37*  --    BILITOT  --  0.5  --           Microbiology Results (last 7 days)       ** No results found for the last 168 hours. **             See below for Radiology    Scheduled Med:   apixaban  2.5 mg Per G Tube BID    atorvastatin  40 mg Oral Daily    cloNIDine 0.1 mg/24 hr td ptwk  1 patch Transdermal Q7 Days    clopidogreL  75 mg Per G Tube Daily    diltiaZEM  120 mg Oral Daily    famotidine (PF)  20 mg Intravenous Daily    hydrALAZINE  75 mg Oral Q8H    isosorbide mononitrate  120 mg Oral Daily    levetiracetam IV  500 mg Intravenous Q12H    lisinopriL  20 mg Oral Daily    metoclopramide HCl  10 mg Intravenous Q6H    metoprolol tartrate  25 mg Oral BID    montelukast  10 mg Oral Daily    nicotine  1 patch Transdermal Daily    sodium chloride 0.9%  10 mL Intravenous Q6H         Continuous Infusions:        None.    PRN Meds:  acetaminophen, acetaminophen, aluminum-magnesium hydroxide-simethicone, dextrose 10%, dextrose 10%, glucagon (human recombinant), hydrALAZINE, insulin aspart U-100, labetalol, labetaloL, levoFLOXacin, melatonin, ondansetron, polyethylene glycol, prochlorperazine, senna-docusate 8.6-50 mg, simethicone, sodium chloride 0.9%, sodium chloride 0.9%, Flushing PICC Protocol **AND** sodium chloride 0.9% **AND** sodium chloride 0.9%, Pharmacy to dose Vancomycin consult **AND** vancomycin - pharmacy to dose       Assessment/Plan:  Leukocytosis  Failure to thrive    Multifocal recent embolic ischemic infarctions with secondary petechial hemorrhage of the left occipital region  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  Aspiration pneumonitis   Oropharyngeal dysphagia  Vascular dementia with behavioral disturbance   Essential hypertension  Peripheral arterial disease status post right femoral-popliteal bypass, right above-the-knee amputation, and left iliac stenting  Coronary artery disease   Seizure disorder  Chronic obstructive pulmonary disease   Bilateral carotid artery stenosis  Anemia of chronic disease  Tobacco abuse      Plan:  Patient's white cell count went up today spiked low-grade temp of around 99  For now I will keep a close watch , get blood cultures   Patient had a lot of residuals yesterday so PEG tube feedings were stopped and later be restarted at 10 cc an hour but patient was still having lot of residuals  Nutrition is was consulted and we will change the feedings to Peptamen AF at 10 cc an hour and see how patient's status  Will also make GI aware  Home dose of Eliquis and Plavix has been resumed  We have reconsulted physical therapy  Continue physical therapy and occupational therapy  Patient completed Cipro and Flagyl for aspiration pneumonitis   continue with PT and OT  case management is working on placement    VTE  prophylaxis:  Eliquis  Patient condition:  Stable but very poor long-term prognosis.      Anticipated discharge and Disposition:     Await skilled nursing facility placement    All diagnosis and differential diagnosis have been reviewed; assessment and plan has been documented; I have personally reviewed the labs and test results that are presently available; I have reviewed the patients medication list; I have reviewed the consulting providers response and recommendations. I have reviewed or attempted to review medical records based upon their availability    All of the patient's questions have been  addressed and answered. Patient's is agreeable to the above stated plan. I will continue to monitor closely and make adjustments to medical management as needed.  _____________________________________________________________________    Nutrition Status:    Radiology:  CT Head Without Contrast  EXAMINATION  CT HEAD WITHOUT CONTRAST    CLINICAL HISTORY  to determine if Eliquis/Plavix can be resumed;    TECHNIQUE  Axial non-contrast CT images of the head were acquired and multiplanar reconstructions accomplished by a CT technologist at a separate workstation, pushed to PACS for physician review.    COMPARISON  15 October 2022    FINDINGS  Images were reviewed in subdural, brain, soft tissue, and bone windows.    Exam quality: Motion/streak artifact limits assessment of the posterior fossa.    Widespread changes of chronic white matter microvascular disease and the left frontal and right posterior parietal encephalomalacia are similar in the short interval.  No areas of new parenchymal attenuation abnormality are identified.  There is no evidence of interval acute intracranial hemorrhage.  No extra-axial fluid collection or midline shift is identified.  There is no discrete mass.    The visualized extracranial soft tissues and regional osseous structures are unchanged.    IMPRESSION  1. No convincing acute intracranial  abnormality.  2. Additional secondary details discussed above, relatively unchanged from the 15 October 2022 appearance.    RADIATION DOSE  Automated tube current modulation, weight-based exposure dosing, and/or iterative reconstruction technique utilized to reach lowest reasonably achievable exposure rate.    DLP: 3079 mGy*cm    Electronically signed by: Connor Damico  Date:    10/20/2022  Time:    08:01      Summer Ga MD   10/29/2022

## 2022-10-30 NOTE — PROGRESS NOTES
[facility] Hospital Medicine Progress Note        Chief Complaint: Inpatient follow-up on stroke    HPI:   Patient is an 82-year-old  female with past medical history of multiple CVAs with residual left-sided weakness, left facial droop, dysarthria and baseline confusion.  Latest CVA in February, 2022.  Bubble study negative.  Ultrasound carotid with less than 50% stenosis bilaterally.  No atrial fibrillation.  Patient was then discharged on Eliquis, Plavix, statin.  Aspirin was discontinued.  Patient presented back to the ED with new onset aphasia-expressive.  Patient was afebrile, hypertensive in the ED.  EKG-NSR.  CT head was negative.  Did confirm the old infarcts.  Patient was transferred to our hospital for Neurology evaluation.  Stroke protocol initiated.  Workup ordered.  Neurology consulted.  MRI negative for CVA.  Symptoms most likely secondary to dehydration, on IV hydration.  Family requesting skilled nursing facility placement.  Home meds resumed as appropriate.  Neurology recommended to continue Eliquis, Plavix.  Patient was agitated on 10/09, gave her a dose of Haldol following which she had worsening mentation with lethargic, drowsiness and hypotension, hypotension improved with IV fluids.  Holding all mood altering drugs.  Placed on IV fluids and kept NPO.  CT head, ABG non impressive.  Chest x-ray consistent with aspiration pneumonia, initiated appropriate antibiotics-Cipro and Flagyl.  Patient has allergy to penicillin.  Patient still lethargic and drowsy on 10/10.  Keeping NPO, on IV fluids.  Mentation much better on 10/11.  Speech cleared patient for dysphagia diet.      Interval Hx:  Patient seen and examined with patient's son bedside patient was not tolerating tube feeding so they were held patient was started on Reglan     Objective/physical exam:  General:  Cachectic chronically ill-appearing  female in no acute distress  HENT:  normocephalic, atraumatic  Eye: PERRL, EOMI, clear conjunctiva  Neck: full ROM, no thyromegaly  Respiratory:  Diminished breath sounds bilaterally  Cardiovascular: regular rate and rhythm  Gastrointestinal:  Has a PEG tube   Musculoskeletal:  Right above-the-knee amputation  Integumentary: warm, dry, intact, no rashes  Neurological:  Awake   Psychiatric:  Demented with flat affect    VITAL SIGNS: 24 HRS MIN & MAX LAST   Temp  Min: 97.4 °F (36.3 °C)  Max: 99 °F (37.2 °C) 99 °F (37.2 °C)   BP  Min: 98/64  Max: 134/71 123/66   Pulse  Min: 75  Max: 103  82   Resp  Min: 16  Max: 18 16   SpO2  Min: 95 %  Max: 97 % 95 %       Recent Labs   Lab 10/25/22  1500 10/29/22  0524 10/30/22  0922   WBC 8.2 17.1* 11.6*   RBC 3.89* 3.64* 3.37*   HGB 11.7* 11.0* 10.0*   HCT 35.5* 32.7* 30.5*   MCV 91.3 89.8 90.5   MCH 30.1 30.2 29.7   MCHC 33.0 33.6 32.8*   RDW 14.2 14.0 14.3    294 308   MPV 9.8 10.0 9.7       Recent Labs   Lab 10/23/22  1107 10/25/22  1500 10/29/22  0524   * 135* 139   K 2.5* 3.1* 2.8*   CO2 23 16* 23   BUN 28.7* 27.5* 28.3*   CREATININE 0.87 0.73 0.89   CALCIUM 9.8 9.7 10.5*   ALBUMIN  --  2.8*  --    ALKPHOS  --  67  --    ALT  --  19  --    AST  --  37*  --    BILITOT  --  0.5  --           Microbiology Results (last 7 days)       Procedure Component Value Units Date/Time    Blood Culture [[id number]] Collected: 10/29/22 1258    Order Status: Resulted Specimen: Blood from Wrist, Right Updated: 10/29/22 1317    Blood Culture [[id number]] Collected: 10/29/22 1304    Order Status: Resulted Specimen: Blood from Hand, Right Updated: 10/29/22 1315             See below for Radiology    Scheduled Med:   apixaban  2.5 mg Per G Tube BID    atorvastatin  40 mg Oral Daily    cloNIDine 0.1 mg/24 hr td ptwk  1 patch Transdermal Q7 Days    clopidogreL  75 mg Per G Tube Daily    diltiaZEM  120 mg Oral Daily    famotidine (PF)  20 mg Intravenous Daily    hydrALAZINE  75 mg Oral Q8H    isosorbide mononitrate  120 mg  Oral Daily    levetiracetam IV  500 mg Intravenous Q12H    lisinopriL  20 mg Oral Daily    metoclopramide HCl  10 mg Intravenous Q6H    metoprolol tartrate  25 mg Oral BID    montelukast  10 mg Oral Daily    nicotine  1 patch Transdermal Daily    sodium chloride 0.9%  10 mL Intravenous Q6H        Continuous Infusions:        None.    PRN Meds:  acetaminophen, acetaminophen, aluminum-magnesium hydroxide-simethicone, dextrose 10%, dextrose 10%, glucagon (human recombinant), hydrALAZINE, insulin aspart U-100, labetalol, labetaloL, levoFLOXacin, melatonin, ondansetron, polyethylene glycol, prochlorperazine, senna-docusate 8.6-50 mg, simethicone, sodium chloride 0.9%, sodium chloride 0.9%, Flushing PICC Protocol **AND** sodium chloride 0.9% **AND** sodium chloride 0.9%       Assessment/Plan:  Leukocytosis  Failure to thrive    Multifocal recent embolic ischemic infarctions with secondary petechial hemorrhage of the left occipital region  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  Aspiration pneumonitis   Oropharyngeal dysphagia  Vascular dementia with behavioral disturbance   Essential hypertension  Peripheral arterial disease status post right femoral-popliteal bypass, right above-the-knee amputation, and left iliac stenting  Coronary artery disease   Seizure disorder  Chronic obstructive pulmonary disease   Bilateral carotid artery stenosis  Anemia of chronic disease  Tobacco abuse      Plan:  White cell count improving we had ordered blood cultures please follow-up on the results not on any antibiotics  We will start the tube feedings Peptamen AF at 10 cc an hours started on Reglan yesterday and see how patient tolerates  Home dose of Eliquis and Plavix has been resumed  We have reconsulted physical therapy  Continue physical therapy and occupational therapy  Patient completed Cipro and Flagyl for aspiration pneumonitis   continue with PT and OT  case management is working on  placement  BMP for this morning is pending will await results  Discussed above with patient's son who was in the room at the time of my visit      Update: Reviewed BMP potassium is low will give 20 mg of IV and 50 mEq of potassium through the PEG tube    VTE prophylaxis:  Eliquis  Patient condition:  Stable but very poor long-term prognosis.      Anticipated discharge and Disposition:     Await skilled nursing facility placement    All diagnosis and differential diagnosis have been reviewed; assessment and plan has been documented; I have personally reviewed the labs and test results that are presently available; I have reviewed the patients medication list; I have reviewed the consulting providers response and recommendations. I have reviewed or attempted to review medical records based upon their availability    All of the patient's questions have been  addressed and answered. Patient's is agreeable to the above stated plan. I will continue to monitor closely and make adjustments to medical management as needed.  _____________________________________________________________________    Nutrition Status:    Radiology:  CT Head Without Contrast  EXAMINATION  CT HEAD WITHOUT CONTRAST    CLINICAL HISTORY  to determine if Eliquis/Plavix can be resumed;    TECHNIQUE  Axial non-contrast CT images of the head were acquired and multiplanar reconstructions accomplished by a CT technologist at a separate workstation, pushed to PACS for physician review.    COMPARISON  15 October 2022    FINDINGS  Images were reviewed in subdural, brain, soft tissue, and bone windows.    Exam quality: Motion/streak artifact limits assessment of the posterior fossa.    Widespread changes of chronic white matter microvascular disease and the left frontal and right posterior parietal encephalomalacia are similar in the short interval.  No areas of new parenchymal attenuation abnormality are identified.  There is no evidence of interval acute  intracranial hemorrhage.  No extra-axial fluid collection or midline shift is identified.  There is no discrete mass.    The visualized extracranial soft tissues and regional osseous structures are unchanged.    IMPRESSION  1. No convincing acute intracranial abnormality.  2. Additional secondary details discussed above, relatively unchanged from the 15 October 2022 appearance.    RADIATION DOSE  Automated tube current modulation, weight-based exposure dosing, and/or iterative reconstruction technique utilized to reach lowest reasonably achievable exposure rate.    DLP: 3079 mGy*cm    Electronically signed by: Connor Damico  Date:    10/20/2022  Time:    08:01      Summer Ga MD   10/30/2022

## 2022-10-31 NOTE — PT/OT/SLP EVAL
Occupational Therapy  Treatment    Daina Kinsey   MRN: [id number]   Admitting Diagnosis: Stroke    OT Date of Treatment: 10/31/22   OT Start Time: 0933  OT Stop Time: 0949  OT Total Time (min): 16 min     Billable Minutes:  Therapeutic Activity 16  Total Minutes: 16     OT/XANDER: XANDER     XANDER Visit Number: 4    General Precautions: Standard, aspiration (BP<140)  Orthopedic Precautions: N/A  Braces: N/A    Spiritual, Cultural Beliefs, Nondenominational Practices, Values that Affect Care: no    Subjective:  Communicated with nurse prior to session.    Objective:  Patient found with: telemetry, PEG Tube, PICC line    Additional Treatment:  AAROM with LUE, PROM with passive stretch to RUE, fo;;owed by proper positioning to prevent risk of contractures    Patient left HOB elevated with all lines intact, call button in reach, and family present    ASSESSMENT:  Daina Kinsey is a 82 y.o. female with a medical diagnosis of Stroke and presents with decreased functional mobility, decreased sitting balance and decreased ADL skills.    Rehab potential is fair    Activity tolerance: Fair    Discharge recommendations: nursing facility, basic     Equipment recommendations: none     GOALS:   Multidisciplinary Problems       Occupational Therapy Goals          Problem: Occupational Therapy    Goal Priority Disciplines Outcome Interventions   Occupational Therapy Goal     OT, PT/OT Ongoing, Progressing    Description: Goals to be met by: 10/25/2022     Patient will increase functional independence with ADLs by performing:    Feeding with Set-up Assistance.  UE Dressing with Moderate Assistance.  LE Dressing with Moderate Assistance.  Grooming while seated at sink with Minimal Assistance.  Toileting from bed level with Maximum Assistance for hygiene and clothing management.                          Plan:  Patient to be seen 3 x/week, 2 x/week to address the above listed problems via self-care/home management, therapeutic activities,  therapeutic exercises  Plan of Care expires: 11/02/22  Plan of Care reviewed with: patient         10/31/2022

## 2022-10-31 NOTE — PROGRESS NOTES
Pharmacist Intervention IV to PO Note    Daina Kinsey is a 82 y.o. female being treated with IV medication famotidine    Patient Data:    Vital Signs (Most Recent):  Temp: 99.2 °F (37.3 °C) (10/31/22 1104)  Pulse: 103 (10/31/22 1104)  Resp: 18 (10/31/22 0559)  BP: (!) 163/76 (reported bp to nurse) (10/31/22 1104)  SpO2: 96 % (10/31/22 1104)   Vital Signs (72h Range):  Temp:  [97.4 °F (36.3 °C)-99.2 °F (37.3 °C)]   Pulse:  []   Resp:  [16-20]   BP: ()/(52-90)   SpO2:  [92 %-99 %]      CBC:  Recent Labs   Lab 10/25/22  1500 10/29/22  0524 10/30/22  0922   WBC 8.2 17.1* 11.6*   RBC 3.89* 3.64* 3.37*   HGB 11.7* 11.0* 10.0*   HCT 35.5* 32.7* 30.5*    294 308   MCV 91.3 89.8 90.5   MCH 30.1 30.2 29.7   MCHC 33.0 33.6 32.8*     CMP:     Recent Labs   Lab 10/25/22  1500 10/29/22  0524 10/30/22  0922   CALCIUM 9.7 10.5* 9.8   ALBUMIN 2.8*  --   --    * 139 138   K 3.1* 2.8* 2.7*   CO2 16* 23 21*   BUN 27.5* 28.3* 31.1*   CREATININE 0.73 0.89 0.92   ALKPHOS 67  --   --    ALT 19  --   --    AST 37*  --   --    BILITOT 0.5  --   --        Dietary Orders:  Diet Orders            Tube Feedings/Formulas 10; Peptamen AF; Peg; 110; Every 4 hours: Tube Feeding (I) starting at 10/30 1022    Diet Pureed Mildly Thick Liquids: Pureed starting at 10/26 1623    Dietary nutrition supplements Boost VHC Vanilla; BID starting at 10/08 1240            Based on the following criteria, this patient qualifies for intravenous to oral conversion:  [x] The patients gastrointestinal tract is functioning (tolerating medications via oral or enteral route for 24 hours and tolerating food or enteral feeds for 24 hours).  [x] The patient is hemodynamically stable for 24 hours (heart rate <100 beats per minute, systolic blood pressure >99 mm Hg, and respiratory rate <20 breaths per minute).  [x] The patient shows clinical improvement (afebrile for at least 24 hours and white blood cell count downtrending or normalized).  Additionally, the patient must be non-neutropenic (absolute neutrophil count >500 cells/mm3).  [x] For antimicrobials, the patient has received IV therapy for at least 24 hours.    Famotidine 20 mg IV daily will be changed to famotidine 20 mg per G tube daily    Pharmacist's Name: Lashell Sanon, JoselitoD  Pharmacist's Extension: [phone]

## 2022-10-31 NOTE — ANESTHESIA POSTPROCEDURE EVALUATION
Anesthesia Post Evaluation    Patient: Daina Kinsey    Procedure(s) Performed: Procedure(s) (LRB):  PEG (N/A)  EGD, WITH CLOSED BIOPSY    Final Anesthesia Type: general      Patient location during evaluation: PACU  Patient participation: Yes- Able to Participate  Level of consciousness: awake and alert  Post-procedure vital signs: reviewed and stable  Pain management: adequate  Airway patency: patent      Anesthetic complications: no      Cardiovascular status: blood pressure returned to baseline  Respiratory status: unassisted  Hydration status: euvolemic  Follow-up not needed.          Vitals Value Taken Time   /76 10/31/22 1104   Temp 37.3 °C (99.2 °F) 10/31/22 1104   Pulse 103 10/31/22 1104   Resp 18 10/31/22 0559   SpO2 96 % 10/31/22 1104         No case tracking events are documented in the log.      Pain/Rosina Score: No data recorded

## 2022-10-31 NOTE — PT/OT/SLP PROGRESS
Physical Therapy Treatment    Patient Name:  Daina Kinsey   MRN:  [id number]    Recommendations:     Discharge Recommendations:  nursing facility, basic   Discharge Equipment Recommendations: none   Barriers to discharge:       Assessment:     Daina Kinsey is a 82 y.o. female admitted with a medical diagnosis of Stroke.  She presents with the following impairments/functional limitations:  weakness, impaired endurance, decreased lower extremity function, decreased upper extremity function, decreased safety awareness, gait instability, impaired functional mobility, impaired balance, impaired self care skills, impaired cognition .    Rehab Prognosis: Good; patient would benefit from acute skilled PT services to address these deficits and reach maximum level of function.    Recent Surgery: Procedure(s) (LRB):  PEG (N/A)  EGD, WITH CLOSED BIOPSY 5 Days Post-Op    Plan:     During this hospitalization, patient to be seen 2 x/week to address the identified rehab impairments via therapeutic activities, therapeutic exercises and progress toward the following goals:    Plan of Care Expires:  11/08/22    Subjective     Chief Complaint:   Patient/Family Comments/goals:   Pain/Comfort:         Objective:     Communicated with NSG prior to session.  Patient found HOB elevated, alseep connected to  telemetry, PEG Tube, PICC line upon PTA entry to room.     General Precautions: Standard, aspiration, fall   Orthopedic Precautions:N/A   Braces: N/A  Respiratory Status: Room air     Functional Mobility:  Bed: TotalA long sit pt. Pt opened her eyes slightly. Pt did not engage with PTA.       Treatment & Education:  L LE PROM: ankle ROM, hip flexion, hip abd, hip add    Patient left HOB elevated with all lines intact, call button in reach, and daughter present..    GOALS:   Multidisciplinary Problems       Physical Therapy Goals          Problem: Physical Therapy    Goal Priority Disciplines Outcome Goal Variances Interventions    Physical Therapy Goal     PT, PT/OT Unable to Meet, Plan Revised     Description: Goals to be met by: 2022     Patient will increase functional independence with mobility by performin. Supine to sit with Modified [place]  2. Sit to supine with Modified [place]  3. Sit to stand transfer with Contact Guard Assistance  4. Bed to chair transfer with Minimal Assistance using No Assistive Device  5. Wheelchair propulsion x500 feet with Stand-by Assistance using bilateral uppper extremities                         Time Tracking:     PT Received On:    PT Start Time: 1546     PT Stop Time: 1556  PT Total Time (min): 10 min     Billable Minutes: Therapeutic Exercise 10    Treatment Type: Treatment  PT/PTA: PTA     PTA Visit Number: 2     10/31/2022

## 2022-10-31 NOTE — PROGRESS NOTES
Pharmacist Intervention IV to PO Note    Daina Kinsey is a 82 y.o. female being treated with IV medication levetiracetam    Patient Data:    Vital Signs (Most Recent):  Temp: 99.2 °F (37.3 °C) (10/31/22 1104)  Pulse: 103 (10/31/22 1104)  Resp: 18 (10/31/22 0559)  BP: (!) 163/76 (reported bp to nurse) (10/31/22 1104)  SpO2: 96 % (10/31/22 1104)   Vital Signs (72h Range):  Temp:  [97.4 °F (36.3 °C)-99.2 °F (37.3 °C)]   Pulse:  []   Resp:  [16-20]   BP: ()/(52-90)   SpO2:  [92 %-99 %]      CBC:  Recent Labs   Lab 10/25/22  1500 10/29/22  0524 10/30/22  0922   WBC 8.2 17.1* 11.6*   RBC 3.89* 3.64* 3.37*   HGB 11.7* 11.0* 10.0*   HCT 35.5* 32.7* 30.5*    294 308   MCV 91.3 89.8 90.5   MCH 30.1 30.2 29.7   MCHC 33.0 33.6 32.8*     CMP:     Recent Labs   Lab 10/25/22  1500 10/29/22  0524 10/30/22  0922   CALCIUM 9.7 10.5* 9.8   ALBUMIN 2.8*  --   --    * 139 138   K 3.1* 2.8* 2.7*   CO2 16* 23 21*   BUN 27.5* 28.3* 31.1*   CREATININE 0.73 0.89 0.92   ALKPHOS 67  --   --    ALT 19  --   --    AST 37*  --   --    BILITOT 0.5  --   --        Dietary Orders:  Diet Orders            Tube Feedings/Formulas 10; Peptamen AF; Peg; 110; Every 4 hours: Tube Feeding (I) starting at 10/30 1022    Diet Pureed Mildly Thick Liquids: Pureed starting at 10/26 1623    Dietary nutrition supplements Boost VHC Vanilla; BID starting at 10/08 1240            Based on the following criteria, this patient qualifies for intravenous to oral conversion:  [x] The patients gastrointestinal tract is functioning (tolerating medications via oral or enteral route for 24 hours and tolerating food or enteral feeds for 24 hours).  [x] The patient is hemodynamically stable for 24 hours (heart rate <100 beats per minute, systolic blood pressure >99 mm Hg, and respiratory rate <20 breaths per minute).  [x] The patient shows clinical improvement (afebrile for at least 24 hours and white blood cell count downtrending or normalized).  Additionally, the patient must be non-neutropenic (absolute neutrophil count >500 cells/mm3).  [x] For antimicrobials, the patient has received IV therapy for at least 24 hours.    Levetiracetam 500 mg IVPB Q12H will be changed to levetiracetam 500 mg per G tube Q12H.    Pharmacist's Name: Lashell Sanon, Jane  Pharmacist's Extension: [phone]

## 2022-11-01 NOTE — PROGRESS NOTES
Hospital Medicine  Progress Note    Patient Name: Daina Kinsey  MRN: [id number]  Status: IP- Inpatient   Admission Date: 10/7/2022  Length of Stay: 24      CC: hospital follow-up for FTT       SUBJECTIVE   HPI and hospital course reviewed    Today: Patient seen and examined at bedside, and chart reviewed.  No family at bedside.  Tube feeds turned off due to gastric residuals.  Oral intake remains extremely poor.  Questionable vaginal bleeding overnight.      MEDICATIONS   Scheduled   apixaban  2.5 mg Per G Tube BID    atorvastatin  40 mg Oral Daily    cloNIDine 0.1 mg/24 hr td ptwk  1 patch Transdermal Q7 Days    clopidogreL  75 mg Per G Tube Daily    diltiaZEM  120 mg Oral Daily    [START ON 11/1/2022] famotidine  20 mg Per G Tube Daily    hydrALAZINE  75 mg Oral Q8H    isosorbide mononitrate  120 mg Oral Daily    levetiracetam  500 mg Per G Tube Q12H    lisinopriL  20 mg Oral Daily    metoclopramide HCl  10 mg Intravenous Q6H    metoprolol tartrate  25 mg Oral BID    montelukast  10 mg Oral Daily    nicotine  1 patch Transdermal Daily    sodium chloride 0.9%  10 mL Intravenous Q6H     Continuous Infusions  None      PHYSICAL EXAM   VITALS: T 98.4 °F (36.9 °C)   BP (!) 162/71   P 87   RR 16   O2 (!) 93 %    GENERAL: Awake and in NAD  LUNGS: Respirations non-labored  CVS: Normal rate  GI/: Soft, NT, bowel sounds hypoactive  EXTREMITIES: No peripheral edema  NEURO: Awake, alert, not oriented  PSYCH: Cooperative      LABS   CBC  Recent Labs     10/30/22  0922   WBC 11.6*   HGB 10.0*   HCT 30.5*         CHEM  Recent Labs     10/30/22  0922      K 2.7*   CO2 21*   BUN 31.1*   CREATININE 0.92   CALCIUM 9.8       MICROBIOLOGY     Microbiology Results (last 7 days)       Procedure Component Value Units Date/Time    Blood Culture [[id number]]  (Normal) Collected: 10/29/22 1258    Order Status: Completed Specimen: Blood from Wrist, Right Updated: 10/31/22 1500     CULTURE, BLOOD ([facility]) No Growth At 48 Hours     Blood Culture [[id number]]  (Normal) Collected: 10/29/22 1304    Order Status: Completed Specimen: Blood from Hand, Right Updated: 10/31/22 1500     CULTURE, BLOOD ([facility]) No Growth At 48 Hours              DIAGNOSTICS   US Pelvis Complete Non OB  Narrative:  TECHNICALLY CHALLENGING EXAM WITH PATIENT IN THE FETAL POSITION  Uterus:  Size: 4.6 x 2.4 x 3.2 cm  Masses: None  Endometrium: Minimal amount of fluid in the cervical canal endometrial stripe measuring 1.3 mm  Right ovary:  Was not visualize  Left ovary:  Was not visualize  Free Fluid:  None.  Impression:   No significant abnormalities.  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  Aspiration pneumonitis   Oropharyngeal dysphagia  Vascular dementia with behavioral disturbance   Essential hypertension  Peripheral arterial disease status post right femoral-popliteal bypass, right above-the-knee amputation, and left iliac stenting  Coronary artery disease   Seizure disorder  Chronic obstructive pulmonary disease   Bilateral carotid artery stenosis  Anemia of chronic disease  ho Tobacco abuse    PLAN   Continue attempts at titrating tube feeds, continue IV Reglan  Otherwise continue current management including Eliquis, will monitor H&H and bleeding      Prophylaxis: Bobby Victor MD  [facility]

## 2022-11-01 NOTE — NURSING
PEG residual at 2000 = 300mL's.   20 mLs used to administer medications    Paused PEG feeding at 2000.    Residual check at 0000 225 mL's, will continue to hold until <100 mL due to patients history of emesis with tube feedings.    Residual check at 0415: 90 mL's, restarted tube feedings at 30 mL/hr with q4h 110 mL water flush per reqs.

## 2022-11-01 NOTE — PROGRESS NOTES
Inpatient Nutrition Assessment    Admit Date: 10/7/2022   Total duration of encounter: 25 days     Nutrition Recommendation/Prescription       - Impact Peptide 1.5 @ goal of 40 ml/hr will provide** 1200 kcals (91% est needs), 75 g protein (100+% est needs), 693 ml fluid. Can add 150 ml flush q 4 hrs for an additional 900 ml fluid and a total fluid provision of 1593 ml (100+% est needs). Initiate @ 10 ml/hr, increase by 10 ml/hr q 6 hrs to goal as tolerated.     **All tf calculations are based on tube feeding running 20 hours per day.       - We, nor the American Society of Parenteral and Enteral Nutrition, do not recommend tracking   residuals as they have not been found to be good indicators of tf tolerance or lack thereof. It is recommended to monitor over signs of intolerance such as abdominal distention, lack of bowel activity, and vomiting.     - Maintain patient at 30 degree angle or greater in bed.       Communication of Recommendations: reviewed with nurse    Nutrition Assessment     Malnutrition Assessment/Nutrition-Focused Physical Exam    Malnutrition in the context of acute illness or injury  Degree of Malnutrition: non-severe (moderate) malnutrition  Energy Intake: < 75% of estimated energy requirement for > 7 days  Interpretation of Weight Loss: does not meet criteria  Body Fat:mild depletion  Area of Body Fat Loss: orbital region  and upper arm region - triceps / biceps  Muscle Mass Loss: moderate depletion  Area of Muscle Mass Loss: clavicle bone region - pectoralis major, deltoid, trapezius muscles, dorsal hand - interosseous musle, and patellar region - quadricep muscle  Fluid Accumulation: does not meet criteria  Edema: not applicable   Reduced  Strength: unable to obtain  A minimum of two characteristics is recommended for diagnosis of either severe or non-severe malnutrition.    Chart Review    Reason Seen: follow-up    Diagnosis:  Acute expressive aphasia ----- History of recurrent  ischemic/embolic CVAs with residual left hemiparesis, dysarthria, and left facial droop  Bilateral carotid atherosclerotic disease  Probable vascular dementia  Hypertension      Relevant Medical History:   Multiple CVAs-  left-sided weakness, left facial droop, dysarthria, and confusion  Bilateral LONG  PAD - right fem-tib bypass, right AKA, left iliac stent  CAD-obstructive per [facility] 2013  Probable vascular dementia  Seizure  HTN  HLD  COPD  Osteoarthritis    Nutrition-Related Medications: Atorvastatin, Hydralazine, Lisinopril, Metoprolol, Nicotine, Insulin PRN, Reglan, Famotidine     Calorie Containing IV Medications: Clinimix (details below)     Nutrition-Related Labs:  10/8: Cl 110, GFR >60  10/13: H/H-10.3/31.4, K-2.7, Bun-9.2  10/16: K-3.1, Bun-4.5, Gluc-79  10/20: Glu 119, K 3.2   10/34: CBGs 127-145  10/30: WBC 11.6, K 2.7, BUN 31.1     Diet/PN Order: Diet Pureed Mildly Thick Liquids  Oral Supplement Order: Boost VHC  Tube Feeding Order: none at this time  Appetite/Oral Intake: poor/0-25% of meals  Factors Affecting Nutritional Intake: chewing difficulty, difficulty/impaired swallowing, and edentulous  Food/Tenriism/Cultural Preferences: none reported    Wound(s): none noted    Comments    10/8: Seen for unsure wt loss. Pt in bed with daughter at bedside. Noted SLP evaluated this am, rec'd minced and moist diet with mildly thick liquids. Pt has not attempted PO intake yet, will monitor tolerance. Per daughter, pt eats fruit, eggs, grits, red beans and rice at home - usually with good intake except for last week or so. Offered ONS VHC- pt receptive. No significant wt loss noted.    10/13: Pt only mumbling during visit. Son at bedside. Pt now NPO. SLP re-consulted. Pt has been refusing medications. Some confusion noted as well. Pt may benefit from Peg placement if unable to resume oral diet due to mental status.     10/17: Pt refusing oral intake. Nsg stated had a hard time getting pt to take oral medications.  Tongue is also difficult to move around for intake. If pt continues to refuse, may benefit from Peg placement and begin Tube feeds for primary source of nutrition.     10/20: Pt refusing most food and drink, has been started on PPN. Palliative discussions underway.      10/24: Pt continues to refuse oral meals. When she does eat, she spits out the food. Family is considering Peg placement. Will await decision.     10/28: Tube feeding was started yesterday, pt vomited today and tf was stopped. Tube feedings currently on hold. Plan is to give nausea meds, then start tf @ 10 ml/hr w/ slow progression. I will make recs for alternative formula in case patient continues to not tolerate.     : TF stopped, either due to residuals or because patient vomited. Discussed case w/ physicians and nurse, offered to order a peptide-based and fiber free formula to which they were amenable. Discussed that we do not recommend tracking/holding for residuals.     Anthropometrics    Height: 5' (152.4 cm) Height Method: Stated  Last Weight: 47.2 kg (104 lb) (10/26/22 1053) Weight Method: Bed Scale  BMI (Calculated): 20.3-Not appropriate due to Rt BKA  BMI Classification: normal (BMI 18.5-24.9)     Ideal Body Weight (IBW), Female: 100 lb     % Ideal Body Weight, Female (lb): 104 %        Usual Body Weight (UBW), k kg (98-105lb)  % Usual Body Weight: 103.69  % Weight Change From Usual Weight: 3.48 %  Usual Weight Provided By: family/caregiver    Wt Readings from Last 5 Encounters:   10/26/22 47.2 kg (104 lb)   22 47.6 kg (105 lb)   22 45.4 kg (100 lb)   22 46.3 kg (102 lb)   19 46.6 kg (102 lb 11.8 oz)     Weight Change(s) Since Admission:   Admit Weight: 47.6 kg (105 lb) (10/07/22 1421)  10/13: 47.6kg  10/17: 47.6kg  10/24: no new wt    Estimated Needs    Weight Used For Calorie Calculations: 47.6 kg (104 lb 15 oz)  Energy Calorie Requirements (kcal): 1316kcals (28kcal/kg)  Energy Need Method:  Kcal/kg  Weight Used For Protein Calculations: 47.6 kg (104 lb 15 oz)  Protein Requirements: 57gm/kg (1.2gm/kg)  Fluid Requirements (mL): 1300mL  Temp: 99 °F (37.2 °C)       Enteral Nutrition    Patient not receiving enteral nutrition at this time.    Parenteral Nutrition    Not receiving parenteral nutrition at this time.       Evaluation of Received Nutrient Intake    Calories: not meeting estimated needs  Protein: not meeting estimated needs    Patient Education    Not applicable.    Nutrition Diagnosis     PES: Malnutrition related to stroke as evidenced by <75% estimated energy needs, physical evidence of mild muscle/fat wasting. (continues)    Interventions/Goals     Intervention(s): modified composition of meals/snacks, modified composition of enteral nutrition, commercial beverage, and collaboration with other providers  Goal: Meet greater than 75% of nutritional needs by follow-up. (goal not met)    Monitoring & Evaluation     Dietitian will monitor food and beverage intake, enteral nutrition intake, and weight change.  Nutrition Risk/Follow-Up: high (follow-up in 1-4 days)

## 2022-11-02 NOTE — PT/OT/SLP PROGRESS
Occupational Therapy  Treatment    Daina Kinsey   MRN: [id number]   Admitting Diagnosis: Stroke    OT Date of Treatment: 11/02/22   OT Start Time: 1017  OT Stop Time: 1028  OT Total Time (min): 11 min     Billable Minutes:  Therapeutic Activity 11  Total Minutes: 11     OT/XANDER: XANDER     XANDER Visit Number: 5    General Precautions: Standard, NPO  Orthopedic Precautions: N/A  Braces: N/A    Spiritual, Cultural Beliefs, Gnosticism Practices, Values that Affect Care: no    Subjective:  Communicated with nurse prior to session.    Objective:  Patient found with: PEG Tube, PureWick    Functional Mobility:  Bed Mobility:   Supine to sit: Activity did not occur   Sit to supine: Activity did not occur   Rolling: Maximum Assistance   Scooting: Activity did not occur    Additional Treatment:  PROM with passive stretch to BUE, bed positioning for comfort    Patient left HOB elevated with all lines intact and call button in reach    ASSESSMENT:  Daina Kinsey is a 82 y.o. female with a medical diagnosis of Stroke and presents with decreased sitting balance, decreased ax tolerance, decreased strength and decreased ADL skills.    Rehab potential is fair    Activity tolerance: Fair    Discharge recommendations: nursing facility, basic     Equipment recommendations: none     GOALS:   Multidisciplinary Problems       Occupational Therapy Goals          Problem: Occupational Therapy    Goal Priority Disciplines Outcome Interventions   Occupational Therapy Goal     OT, PT/OT Ongoing, Progressing    Description: Goals to be met by: 10/25/2022     Patient will increase functional independence with ADLs by performing:    Feeding with Set-up Assistance.  UE Dressing with Moderate Assistance.  LE Dressing with Moderate Assistance.  Grooming while seated at sink with Minimal Assistance.  Toileting from bed level with Maximum Assistance for hygiene and clothing management.                          Plan:  Patient to be seen 3 x/week, 2  x/week to address the above listed problems via self-care/home management, therapeutic activities, therapeutic exercises  Plan of Care expires: 11/02/22  Plan of Care reviewed with: patient         11/02/2022

## 2022-11-02 NOTE — PLAN OF CARE
Problem: Physical Therapy  Goal: Physical Therapy Goal  Description: Goals to be met by: 2022     Patient will increase functional independence with mobility by performin. Supine to sit with Modified [place]  2. Sit to supine with Modified [place]  3. Sit to stand transfer with Contact Guard Assistance  4. Bed to chair transfer with Minimal Assistance using No Assistive Device  5. Wheelchair propulsion x500 feet with Stand-by Assistance using bilateral uppper extremities    Outcome: Ongoing, Not Progressing

## 2022-11-02 NOTE — PROGRESS NOTES
WOCN follow up visit,  educated Kimber CONTRERAS re: treatment recommendations, patient re-educated about repositioning, repositioned patient to L side with wedge post drsg change, call light within reach, son presently at bedside, no distress noted, bed in lower position, will continue to follow.       11/02/22 1040        Altered Skin Integrity 10/22/22 0645 Buttocks Partial thickness tissue loss. Shallow open ulcer with a red or pink wound bed, without slough. Intact or Open/Ruptured Serum-filled blister.   Date First Assessed/Time First Assessed: 10/22/22 0645   Altered Skin Integrity Present on Admission: suspected hospital acquired  Location: Buttocks  Description of Altered Skin Integrity: Partial thickness tissue loss. Shallow open ulcer with a red ...   Wound Image    Description of Altered Skin Integrity Full thickness tissue loss. Subcutaneous fat may be visible but bone, tendon or muscle are not exposed   Dressing Appearance Dry;Intact   Drainage Amount Small   Drainage Characteristics/Odor Serosanguineous   Appearance Red;Purple   Tissue loss description Full thickness   Black (%), Wound Tissue Color 0 %   Red (%), Wound Tissue Color 100 %   Yellow (%), Wound Tissue Color 0 %   Periwound Area Moist;Scar tissue   Wound Edges Defined   Wound Length (cm) 4 cm   Wound Width (cm) 4 cm   Wound Depth (cm) 0.1 cm   Wound Volume (cm^3) 1.6 cm^3   Wound Surface Area (cm^2) 16 cm^2   Care Cleansed with:;Sterile normal saline   Dressing Applied;Hydrocolloid

## 2022-11-02 NOTE — NURSING
2000 residual: 50 mL, no S/S of bloating, no nausea, no vomitus. Tube Feedings increased to 20 mL/hr.    0000 residual: 75 mL, no s/s of bloating/distention, no nausea, no vomitus. Tube feedings maintained at current rate    0400 residual: 40 mL, no s/s of bloating/distention, no nausea, no vomitus. Tube feedings set at 30 mL/hour    0600 residual: 50 mL, no s/s of bloating/distention, no nausea, no vomitus.   Tube Feedings set at 40 mL/hr

## 2022-11-02 NOTE — PROGRESS NOTES
Hospital Medicine  Progress Note    Patient Name: Daina Kinsey  MRN: [id number]  Status: IP- Inpatient   Admission Date: 10/7/2022  Length of Stay: 26      CC: hospital follow-up for FTT       SUBJECTIVE   HPI and hospital course reviewed    Today: Patient seen and examined at bedside, and chart reviewed.  Patient now tolerating tube feeds with yesterdays change. Currently at goal rate.  No further vomiting.    MEDICATIONS   Scheduled   apixaban  2.5 mg Per G Tube BID    atorvastatin  40 mg Oral Daily    cloNIDine 0.1 mg/24 hr td ptwk  1 patch Transdermal Q7 Days    clopidogreL  75 mg Per G Tube Daily    diltiaZEM  120 mg Oral Daily    famotidine  20 mg Per G Tube Daily    hydrALAZINE  75 mg Oral Q8H    isosorbide mononitrate  120 mg Oral Daily    levetiracetam  500 mg Per G Tube Q12H    lisinopriL  20 mg Oral Daily    metoclopramide HCl  10 mg Intravenous Q6H    metoprolol tartrate  25 mg Oral BID    montelukast  10 mg Oral Daily    nicotine  1 patch Transdermal Daily    sodium chloride 0.9%  10 mL Intravenous Q6H     Continuous Infusions  None      PHYSICAL EXAM   VITALS: T 97.6 °F (36.4 °C)   BP (!) 164/84   P 108   RR 20   O2 95 %    GENERAL: Awake and in NAD  LUNGS: Respirations non-labored  CVS: Normal rate  GI/: Soft, NT, bowel sounds hypoactive  EXTREMITIES: No peripheral edema  NEURO: Awake, alert, not oriented  PSYCH: Cooperative      LABS   CBC  Recent Labs     11/01/22  1736   WBC 11.6*   HGB 10.3*   HCT 31.7*         CHEM  Recent Labs     11/01/22  1736      K 3.7   CO2 21*   BUN 19.3   CREATININE 0.80   CALCIUM 9.7   BILITOT 0.4   AST 33   ALT 17   ALKPHOS 87   ALBUMIN 2.4*       MICROBIOLOGY     Microbiology Results (last 7 days)       Procedure Component Value Units Date/Time    Blood Culture [[id number]]  (Normal) Collected: 10/29/22 1258    Order Status: Completed Specimen: Blood from Wrist, Right Updated: 11/01/22 1500     CULTURE, BLOOD ([facility]) No Growth At 72 Hours    Blood Culture  [[id number]]  (Normal) Collected: 10/29/22 1304    Order Status: Completed Specimen: Blood from Hand, Right Updated: 11/01/22 1500     CULTURE, BLOOD ([facility]) No Growth At 72 Hours            ASSESSMENT   Failure to thrive    Recent multifocal embolic ischemic infarctions with secondary petechial hemorrhage of the left occipital region  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  Aspiration pneumonitis   Oropharyngeal dysphagia  Vascular dementia with behavioral disturbance   Essential hypertension  Peripheral arterial disease status post right femoral-popliteal bypass, right above-the-knee amputation, and left iliac stenting  Coronary artery disease   Seizure disorder  Chronic obstructive pulmonary disease   Bilateral carotid artery stenosis  Anemia of chronic disease  ho Tobacco abuse    PLAN   Continue tube feeds at goal, continue with Reglan  If continues to tolerate feeds, could transfer to nursing facility as early as tomorrow  Otherwise continue current management including Eliquis      Prophylaxis: Bobby Victor MD  [facility]

## 2022-11-02 NOTE — PT/OT/SLP PROGRESS
Physical Therapy Treatment    Patient Name:  Daina Kinsey   MRN:  [id number]    Recommendations:     Discharge Recommendations:  nursing facility, basic   Discharge Equipment Recommendations: none   Barriers to discharge:  severity of deficits, dependence in care    Assessment:     Daina Kinsey is a 82 y.o. female admitted with a medical diagnosis of Stroke.  She presents with the following impairments/functional limitations:  weakness, impaired endurance, impaired self care skills, impaired functional mobility, impaired cognition, decreased upper extremity function, decreased lower extremity function, pain. Pt with limited tolerance to activity.    Rehab Prognosis: Poor; patient would benefit from acute skilled PT services to address these deficits and reach maximum level of function.    Recent Surgery: Procedure(s) (LRB):  PEG (N/A)  EGD, WITH CLOSED BIOPSY 7 Days Post-Op    Plan:     During this hospitalization, patient to be seen 2 x/week to address the identified rehab impairments via therapeutic activities, therapeutic exercises and progress toward the following goals:    Plan of Care Expires:  11/08/22    Subjective     Chief Complaint: pt demonstrates pain behavior with all mobility but unable to objectively rate   Patient/Family Comments/goals:   Pain/Comfort:  Pain Rating 1:  (Pt unable to objectively report pain but demonstrates pain behaviors.)  Location - Orientation 1: generalized  Pain Addressed 1: Reposition      Objective:     Communicated with nurse prior to session.  Patient found right sidelying with PEG Tube, PureWick upon PT entry to room.     General Precautions: Standard, NPO   Orthopedic Precautions:N/A   Braces: N/A  Respiratory Status: Room air     Functional Mobility:  Bed Mobility:     Rolling Left:  total assistance  Rolling Right: total assistance  Supine to Sit: did not occur  Sit to Supine: did not occur      AM-PAC 6 CLICK MOBILITY  Turning over in bed (including adjusting  bedclothes, sheets and blankets)?: 2  Sitting down on and standing up from a chair with arms (e.g., wheelchair, bedside commode, etc.): 1  Moving from lying on back to sitting on the side of the bed?: 2  Moving to and from a bed to a chair (including a wheelchair)?: 1  Need to walk in hospital room?: 1  Climbing 3-5 steps with a railing?: 1  Basic Mobility Total Score: 8       Treatment & Education:  Performed PROM to L LE throughout all planes of motion 10reps x 2 sets. R hip PROM throughout all planes of movement 10x1. Pt declined EOB or long sitting activities with increasing agitation noted when therapist provided encouragement. PT repositioned to HOB total A and elevated to semi-supine position to increase tolerance to upright. Pt with poor tolerance to upright position requesting to be lowered before therapist exited room    Patient left HOB elevated with all lines intact and call button in reach..    GOALS:   Multidisciplinary Problems       Physical Therapy Goals          Problem: Physical Therapy    Goal Priority Disciplines Outcome Goal Variances Interventions   Physical Therapy Goal     PT, PT/OT Ongoing, Not Progressing     Description: Goals to be met by: 2022     Patient will increase functional independence with mobility by performin. Supine to sit with Modified [place]  2. Sit to supine with Modified [place]  3. Sit to stand transfer with Contact Guard Assistance  4. Bed to chair transfer with Minimal Assistance using No Assistive Device  5. Wheelchair propulsion x500 feet with Stand-by Assistance using bilateral uppper extremities                         Time Tracking:     PT Received On:    PT Start Time: 916     PT Stop Time: 932  PT Total Time (min): 16 min     Billable Minutes: Therapeutic Activity (16min)    Treatment Type: Treatment  PT/PTA: PT     PTA Visit Number: 0     2022

## 2022-11-03 PROBLEM — R29.90 STROKE-LIKE SYMPTOMS: Status: RESOLVED | Noted: 2022-01-01 | Resolved: 2022-01-01

## 2022-11-03 NOTE — DISCHARGE SUMMARY
[facility] Hospital Medicine Discharge Summary    Admit Date: 10/7/2022  Discharge Date and Time: 11/3/54673:51 AM  Admitting Physician:  Team  Discharging Physician: Lanre Howell MD.  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  Aspiration pneumonitis   Oropharyngeal dysphagia post peg placement    Vascular dementia with behavioral disturbance     Essential hypertension  Peripheral arterial disease status post right femoral-popliteal bypass, right above-the-knee amputation, and left iliac stenting    Coronary artery disease   Seizure disorder  Chronic obstructive pulmonary disease   Bilateral carotid artery stenosis    Anemia of chronic disease  ho Tobacco abuse     Hospital Course:   Patient is an 82-year-old  female with past medical history of multiple CVAs with residual left-sided weakness, left facial droop, dysarthria and baseline confusion.  Latest CVA in February, 2022.  Bubble study negative.  Ultrasound carotid with less than 50% stenosis bilaterally.  No atrial fibrillation.  Patient was then discharged on Eliquis, Plavix, statin.  Aspirin was discontinued.  Patient presented back to the ED with new onset aphasia-expressive.  Patient was afebrile, hypertensive in the ED.  EKG-NSR.  CT head was negative.  Did confirm the old infarcts.  Patient was transferred to our hospital for Neurology evaluation.  Stroke protocol initiated.  Workup ordered.  Neurology consulted.  MRI negative for CVA.  Symptoms most likely secondary to dehydration, post  IV hydration.  Family requesting skilled nursing facility placement.  Home meds resumed as appropriate.  Neurology recommended to continue Eliquis,  Plavix.  Patient was agitated on 10/09, gave her a dose of Haldol following which she had worsening mentation with lethargic, drowsiness and hypotension, hypotension improved with IV fluids.  Holding all mood altering drugs.  Placed on IV fluids and kept NPO.  CT head, ABG non impressive.  Chest x-ray consistent with aspiration pneumonia, initiated appropriate antibiotics-Cipro and Flagyl.  Patient has allergy to penicillin.  Patient still lethargic and drowsy on 10/10.  Keeping NPO, on IV fluids.  Mentation much better on 10/11.  Speech cleared patient for dysphagia diet.  Palliative team was consulted to discuss goals of care with family. At that time her po intake was not adequate and she was not able to take her medications as scheduled. Decision was made in favor of peg tube by family. Peg tube placed w/o difficulties. She had issues tolerating feedings and these had to be changed by nutritionist. She has been tolerating feedings at goal and has been accepted to [facility].      As per Physical therapy on 11/2/22  Functional Mobility:  Bed Mobility:     Rolling Left:  total assistance  Rolling Right: total assistance  Supine to Sit: did not occur  Sit to Supine: did not occur              Pt was seen and examined on the day of discharge  Vitals:  VITAL SIGNS: 24 HRS MIN & MAX LAST   Temp  Min: 97.6 °F (36.4 °C)  Max: 98.8 °F (37.1 °C) 98.2 °F (36.8 °C)   BP  Min: 129/68  Max: 182/90 (!) 168/83     Pulse  Min: 89  Max: 125  90   Resp  Min: 18  Max: 20 20   SpO2  Min: 92 %  Max: 100 % 96 %       Physical Exam:  GENERAL: Awake and in NAD/Cachectic chronically ill-appearing  female in no acute distress    LUNGS: Respirations non-labored    CVS: Normal rate    GI/: Soft, NT, bowel sounds present / peg tube functional     EXTREMITIES: No peripheral edema/Right above-the-knee amputation    NEURO: Awake, alert, not oriented left facial droop, left hemiparesis    PSYCH:  Cooperative    Procedures Performed: No admission procedures for hospital encounter.     Significant Diagnostic Studies: See Full reports for all details    Recent Labs   Lab 10/30/22  0922 11/01/22  1736 11/03/22  0657   WBC 11.6* 11.6* 13.7*   RBC 3.37* 3.47* 4.12*   HGB 10.0* 10.3* 12.3   HCT 30.5* 31.7* 37.0   MCV 90.5 91.4 89.8   MCH 29.7 29.7 29.9   MCHC 32.8* 32.5* 33.2   RDW 14.3 14.5 14.4    366 398   MPV 9.7 9.5 9.4       Recent Labs   Lab 10/30/22  0922 11/01/22  1736 11/03/22  0657    136 140   K 2.7* 3.7 4.4   CO2 21* 21* 23   BUN 31.1* 19.3 25.6*   CREATININE 0.92 0.80 0.77   CALCIUM 9.8 9.7 9.7   ALBUMIN  --  2.4*  --    ALKPHOS  --  87  --    ALT  --  17  --    AST  --  33  --    BILITOT  --  0.4  --         Microbiology Results (last 7 days)       Procedure Component Value Units Date/Time    Blood Culture [[id number]]  (Normal) Collected: 10/29/22 1258    Order Status: Completed Specimen: Blood from Wrist, Right Updated: 11/02/22 1500     CULTURE, BLOOD ([facility]) No Growth At 96 Hours    Blood Culture [[id number]]  (Normal) Collected: 10/29/22 1304    Order Status: Completed Specimen: Blood from Hand, Right Updated: 11/02/22 1500     CULTURE, BLOOD ([facility]) No Growth At 96 Hours             X-Ray Abdomen AP 1 View  Narrative: EXAMINATION:  XR ABDOMEN AP 1 VIEW    CLINICAL HISTORY:  abd distention;    TECHNIQUE:  AP View(s) of the abdomen.    COMPARISON:  CT abdomen/pelvis 12/04/2021    FINDINGS:  Percutaneous feeding tube in the epigastric region.  No dilated bowel identified.  Small to moderate volume stool.  Bilateral iliac and right femoral stents.  Impression: Nonobstructive bowel gas pattern.    Electronically signed by: Bennett Sherman  Date:    11/01/2022  Time:    18:34         Medication List        ASK your doctor about these medications      atorvastatin 20 MG tablet  Commonly known as: LIPITOR     CARTIA  MG Cp24  Generic drug: diltiaZEM     CENTRUM WOMEN ORAL     cholecalciferol  (vitamin D3) 1,250 mcg (50,000 unit) capsule     clopidogreL 75 mg tablet  Commonly known as: PLAVIX     cyproheptadine 4 mg tablet  Commonly known as: PERIACTIN     ELIQUIS 5 mg Tab  Generic drug: apixaban     ergocalciferol 50,000 unit Cap  Commonly known as: ERGOCALCIFEROL     famotidine 20 MG tablet  Commonly known as: PEPCID     hydrALAZINE 50 MG tablet  Commonly known as: APRESOLINE     isosorbide mononitrate 60 MG 24 hr tablet  Commonly known as: IMDUR     levETIRAcetam 250 MG Tab  Commonly known as: KEPPRA  Take 1 tablet (250 mg total) by mouth 2 (two) times daily.     lisinopriL 10 MG tablet     metoprolol succinate 50 MG 24 hr tablet  Commonly known as: TOPROL-XL     mirtazapine 15 MG tablet  Commonly known as: REMERON     montelukast 10 mg tablet  Commonly known as: SINGULAIR     nicotine 21 mg/24 hr  Commonly known as: NICODERM CQ     nitroGLYCERIN 0.4 MG SL tablet  Commonly known as: NITROSTAT     saw palmetto 160 MG capsule     senna-docusate 8.6-50 mg 8.6-50 mg per tablet  Commonly known as: PERICOLACE     traMADoL 50 mg tablet  Commonly known as: ULTRAM               Explained in detail to the patient about the discharge plan, medications, and follow-up visits. Pt understands and agrees with the treatment plan  Discharge Disposition: snf/[facility]   Discharged Condition: stable  Diet- peg tube       Medications Per DC med rec  Activities as tolerated    For further questions contact hospitalist office    Discharge time 33 minutes    For worsening symptoms, chest pain, shortness of breath, increased abdominal pain, high grade fever, stroke or stroke like symptoms, immediately go to the nearest Emergency Room or call 911 as soon as possible.      Lanre Garcia M.D on 11/3/2022. at 8:51 AM.

## 2022-11-03 NOTE — PLAN OF CARE
11/03/22 1228   Final Note   Assessment Type Final Discharge Note   Anticipated Discharge Disposition SNF   Post-Acute Status   Post-Acute Authorization Placement   Post-Acute Placement Status Set-up Complete/Auth obtained  ([facility] [place])

## 2022-11-03 NOTE — PLAN OF CARE
Problem: Occupational Therapy  Goal: Occupational Therapy Goal  Description: Goals to be met by: 11/17/2022     Patient will increase functional independence with ADLs by performing:    Feeding with Set-up Assistance.  UE Dressing with Moderate Assistance.  LE Dressing with Moderate Assistance.  Grooming while seated at sink with Minimal Assistance.  Toileting from bed level with Maximum Assistance for hygiene and clothing management.     Outcome: Ongoing, Not Progressing

## 2022-11-03 NOTE — PT/OT/SLP RE-EVAL
"Occupational Therapy   Re-evaluation    Name: Daina Kinsey  MRN: [id number]  Admitting Diagnosis:  Stroke  Recent Surgery: Procedure(s) (LRB):  PEG (N/A)  EGD, WITH CLOSED BIOPSY 8 Days Post-Op    Recommendations:     Discharge Recommendations: nursing facility, basic  Discharge Equipment Recommendations:  hospital bed  Barriers to discharge:  None    Assessment:     Daina Kinsey is a 82 y.o. female with a medical diagnosis of Stroke.  Pt has a history of dementia, R AKA, and CVA with residual L sided deficits.  She presents with decreased tolerance to mobility.  Performance deficits affecting function are weakness, impaired endurance, impaired balance, gait instability, impaired cognition, impaired self care skills, impaired functional mobility, decreased lower extremity function, decreased upper extremity function, abnormal tone, visual deficits.      Rehab Prognosis:  poor; patient would benefit from acute skilled OT services to address these deficits and reach maximum level of function.       Plan:     Patient to be seen 2 x/week, 3 x/week to address the above listed problems via self-care/home management, therapeutic activities, therapeutic exercises, neuromuscular re-education  Plan of Care Expires: 11/17/22  Plan of Care Reviewed with: patient    Subjective     Chief Complaint: "cover me up"  Patient/Family stated goals: family goal is for patient to receive rehab at SNF  Communicated with: nrsg prior to session.  Pain/Comfort:  Pain Rating 1:  (pt unable to rate pain.)    Objective:     Communicated with: nrsg prior to session.  Patient found HOB elevated R-sidelying on wedge with: PureWick, PEG Tube upon OT entry to room.    General Precautions: Standard, NPO   Orthopedic Precautions:N/A   Braces: N/A  Respiratory Status: Room air    Occupational Performance:    Bed Mobility:    Patient completed Rolling/Turning to Left with  total assistance  Patient completed Rolling/Turning to Right with maximal " assistance  Patient completed Scooting/Bridging with total assistance  Supine to Sit: did not occur  Sit to Supine: did not occur    Functional Mobility/Transfers:  Did not occur. Pt refused sitting EOB. Pt demonstrates increased agitation and decreased tolerance of mobility.      Activities of Daily Living:  Feeding:  maximal assistance with minimal intake. Pt also has PEG tube.   Grooming: maximal assistance . Pt attempted to put chapstick on when handed it; however, pt requires max A to complete task.   Bathing: dependence .  Upper Body Dressing: maximal assistance .  Lower Body Dressing: total assistance .  Toileting: total assistance . Purewick. Pt is incontinent  at baseline.     Cognitive/Visual Perceptual:  Cognitive/Psychosocial Skills:     -       Oriented to:    -       Follows Commands/attention:follows 25% of simple commands  -       Mood/Affect/Coping skills/emotional control: Lethargic and Agitated  Visual/Perceptual:      -unable to assess accurately due to pt's difficulty following commands    Physical Exam:  Upper Extremity Range of Motion:     -       Right Upper Extremity: PROM/AROM intact  -       Left Upper Extremity: hypertonicity/contractures in all joints  Upper Extremity Strength:    -       Right Upper Extremity: full AROM; pt unable to follow commands for formal MMT.   -       Left Upper Extremity: significantly weaker from prior stroke; some movement with elbow flexion; no movements noted in other joints; pt unable to follow commands for formal MMT      Treatment & Education:  OT re-eval; Continue with current POC.     PROM with passive stretch to RUE in all joints and planes to decreased risk of further contractures.     Patient left HOB right sidelying on wedge with all lines intact, call button in reach, and family present    GOALS:   Multidisciplinary Problems       Occupational Therapy Goals          Problem: Occupational Therapy    Goal Priority Disciplines Outcome Interventions    Occupational Therapy Goal     OT, PT/OT Ongoing, Not Progressing    Description: Goals to be met by: 11/17/2022     Patient will increase functional independence with ADLs by performing:    Feeding with Set-up Assistance.  UE Dressing with Moderate Assistance.  LE Dressing with Moderate Assistance.  Grooming while seated at sink with Minimal Assistance.  Toileting from bed level with Maximum Assistance for hygiene and clothing management.                          History:     Past Medical History:   Diagnosis Date    Arthritis     Atherosclerosis     Cataracts, bilateral     COPD (chronic obstructive pulmonary disease)     Gallstones     HTN (hypertension)     PAD (peripheral artery disease)     Seizure     Stomach ulcer     Stroke     Vitamin D deficiency          Past Surgical History:   Procedure Laterality Date    CREATION OF FEMORAL-TIBIAL ARTERY BYPASS      ESOPHAGOGASTRODUODENOSCOPY W/ PEG N/A 10/26/2022    Procedure: PEG;  Surgeon: Mateusz Talbert MD;  Location: [facility] ENDOSCOPY;  Service: Gastroenterology;  Laterality: N/A;    INFERIOR VENA CAVA ANGIOPLASTY / STENTING      LEG AMPUTATION THROUGH KNEE      right    REPAIR OF CAROTID ARTERY      TOE AMPUTATION      left great toe       Time Tracking:     OT Date of Treatment: 11/03/22  OT Start Time: 0943  OT Stop Time: 0959  OT Total Time (min): 16 min    Billable Minutes:Re-eval 16 min    11/3/2022

## 2022-11-04 PROBLEM — I10 ESSENTIAL HYPERTENSION: Status: ACTIVE | Noted: 2022-01-01

## 2022-11-04 PROBLEM — S78.119A AMPUTATION ABOVE KNEE: Status: ACTIVE | Noted: 2022-01-01

## 2022-11-04 PROBLEM — Z72.0 TOBACCO USER: Status: ACTIVE | Noted: 2022-01-01

## 2022-11-04 PROBLEM — I25.10 ATHEROSCLEROTIC HEART DISEASE OF NATIVE CORONARY ARTERY WITHOUT ANGINA PECTORIS: Status: ACTIVE | Noted: 2022-01-01

## 2022-11-04 PROBLEM — K63.1 BOWEL PERFORATION: Status: ACTIVE | Noted: 2022-01-01

## 2022-11-04 PROBLEM — I05.9 MITRAL VALVE DISORDER: Status: ACTIVE | Noted: 2022-01-01

## 2022-11-04 PROBLEM — K25.9 GASTRIC ULCER: Status: ACTIVE | Noted: 2022-01-01

## 2022-11-04 PROBLEM — E87.6 HYPOKALEMIA: Status: ACTIVE | Noted: 2022-01-01

## 2022-11-04 PROBLEM — J44.9 CHRONIC OBSTRUCTIVE PULMONARY DISEASE: Status: ACTIVE | Noted: 2022-01-01

## 2022-11-04 PROBLEM — D64.9 ANEMIA: Status: ACTIVE | Noted: 2022-01-01

## 2022-11-04 PROBLEM — J69.0 ASPIRATION PNEUMONIA: Status: ACTIVE | Noted: 2022-01-01

## 2022-11-04 PROBLEM — G40.909 EPILEPSY: Status: ACTIVE | Noted: 2022-01-01

## 2022-11-04 PROBLEM — I77.9 PERIPHERAL ARTERIAL OCCLUSIVE DISEASE: Status: ACTIVE | Noted: 2022-01-01

## 2022-11-04 PROBLEM — K55.9 ISCHEMIC BOWEL DISEASE: Status: ACTIVE | Noted: 2022-01-01

## 2022-11-04 PROBLEM — R65.21 SEPTIC SHOCK: Status: ACTIVE | Noted: 2022-01-01

## 2022-11-04 PROBLEM — J18.9 PNEUMONIA: Status: ACTIVE | Noted: 2022-01-01

## 2022-11-04 PROBLEM — J96.01 ACUTE RESPIRATORY FAILURE WITH HYPOXEMIA: Status: ACTIVE | Noted: 2022-01-01

## 2022-11-04 PROBLEM — A41.9 SEPTIC SHOCK: Status: ACTIVE | Noted: 2022-01-01

## 2022-11-09 PROBLEM — E63.9 INADEQUATE DIETARY ENERGY INTAKE: Status: ACTIVE | Noted: 2022-01-01

## 2022-11-10 PROBLEM — L02.91 ABSCESS: Status: ACTIVE | Noted: 2022-01-01

## 2022-11-10 PROBLEM — I63.9 CVA (CEREBRAL VASCULAR ACCIDENT): Status: ACTIVE | Noted: 2022-01-01

## 2022-11-10 PROBLEM — N17.9 AKI (ACUTE KIDNEY INJURY): Status: ACTIVE | Noted: 2022-01-01

## 2022-11-13 PROBLEM — Z51.5 COMFORT MEASURES ONLY STATUS: Status: ACTIVE | Noted: 2022-01-01

## 2022-11-13 PROBLEM — A49.02 MRSA INFECTION: Status: ACTIVE | Noted: 2022-01-01

## 2022-11-14 PROBLEM — Z51.5 COMFORT MEASURES ONLY STATUS: Status: RESOLVED | Noted: 2022-01-01 | Resolved: 2022-01-01

## 2022-11-14 PROBLEM — A49.02 MRSA INFECTION: Status: RESOLVED | Noted: 2022-01-01 | Resolved: 2022-01-01

## 2022-11-14 PROBLEM — J96.01 ACUTE RESPIRATORY FAILURE WITH HYPOXEMIA: Status: RESOLVED | Noted: 2022-01-01 | Resolved: 2022-01-01

## 2022-11-14 PROBLEM — N17.9 AKI (ACUTE KIDNEY INJURY): Status: RESOLVED | Noted: 2022-01-01 | Resolved: 2022-01-01

## 2022-11-14 PROBLEM — E63.9 INADEQUATE DIETARY ENERGY INTAKE: Status: RESOLVED | Noted: 2022-01-01 | Resolved: 2022-01-01

## 2022-11-14 PROBLEM — J69.0 ASPIRATION PNEUMONIA: Status: RESOLVED | Noted: 2022-01-01 | Resolved: 2022-01-01

## 2022-11-14 PROBLEM — E87.6 HYPOKALEMIA: Status: RESOLVED | Noted: 2022-01-01 | Resolved: 2022-01-01

## 2022-11-14 PROBLEM — A41.9 SEPTIC SHOCK: Status: RESOLVED | Noted: 2022-01-01 | Resolved: 2022-01-01

## 2022-11-14 PROBLEM — I63.9 CVA (CEREBRAL VASCULAR ACCIDENT): Status: RESOLVED | Noted: 2022-01-01 | Resolved: 2022-01-01

## 2022-11-14 PROBLEM — K63.1 BOWEL PERFORATION: Status: RESOLVED | Noted: 2022-01-01 | Resolved: 2022-01-01

## 2022-11-14 PROBLEM — R65.21 SEPTIC SHOCK: Status: RESOLVED | Noted: 2022-01-01 | Resolved: 2022-01-01

## 2022-11-14 PROBLEM — K55.9 ISCHEMIC BOWEL DISEASE: Status: RESOLVED | Noted: 2022-01-01 | Resolved: 2022-01-01

## 2022-11-14 PROBLEM — D64.9 ANEMIA: Status: RESOLVED | Noted: 2022-01-01 | Resolved: 2022-01-01

## 2022-11-18 NOTE — PROGRESS NOTES
Hospital Medicine  Progress Note    Patient Name: Daina Kinsey  MRN: [id number]  Status: IP- Inpatient   Admission Date: 10/7/2022  Length of Stay: 25      CC: hospital follow-up for FTT       SUBJECTIVE   HPI and hospital course reviewed    Today: Patient seen and examined at bedside, and chart reviewed.  Continues to be intolerable of tube feeds.  Nursing noting that patient was vomiting tube feeds.    MEDICATIONS   Scheduled   apixaban  2.5 mg Per G Tube BID    atorvastatin  40 mg Oral Daily    cloNIDine 0.1 mg/24 hr td ptwk  1 patch Transdermal Q7 Days    clopidogreL  75 mg Per G Tube Daily    diltiaZEM  120 mg Oral Daily    famotidine  20 mg Per G Tube Daily    hydrALAZINE  75 mg Oral Q8H    isosorbide mononitrate  120 mg Oral Daily    levetiracetam  500 mg Per G Tube Q12H    lisinopriL  20 mg Oral Daily    metoclopramide HCl  10 mg Intravenous Q6H    metoprolol tartrate  25 mg Oral BID    montelukast  10 mg Oral Daily    nicotine  1 patch Transdermal Daily    sodium chloride 0.9%  10 mL Intravenous Q6H     Continuous Infusions  None      PHYSICAL EXAM   VITALS: T 98.1 °F (36.7 °C)   BP (!) 144/76   P (!) 114   RR 16   O2 100 %    GENERAL: Awake and in NAD  LUNGS: Respirations non-labored  CVS: Normal rate  GI/: Soft, NT, bowel sounds hypoactive  EXTREMITIES: No peripheral edema  NEURO: Awake, alert, not oriented  PSYCH: Cooperative      LABS   CBC  Recent Labs     11/01/22  1736   WBC 11.6*   HGB 10.3*   HCT 31.7*           CHEM  Recent Labs     11/01/22  1736      K 3.7   CO2 21*   BUN 19.3   CREATININE 0.80   CALCIUM 9.7   BILITOT 0.4   AST 33   ALT 17   ALKPHOS 87   ALBUMIN 2.4*         MICROBIOLOGY     Microbiology Results (last 7 days)       Procedure Component Value Units Date/Time    Blood Culture [[id number]]  (Normal) Collected: 10/29/22 1258    Order Status: Completed Specimen: Blood from Wrist, Right Updated: 11/01/22 1500     CULTURE, BLOOD ([facility]) No Growth At 72 Hours    Blood  Pre-Surgery Instructions:   Medication Instructions   • acetaminophen (TYLENOL) 500 mg tablet Uses PRN- OK to take day of surgery   • ferrous sulfate 325 (65 Fe) mg tablet Hold day of surgery     See above Culture [[id number]]  (Normal) Collected: 10/29/22 1304    Order Status: Completed Specimen: Blood from Hand, Right Updated: 11/01/22 1500     CULTURE, BLOOD ([facility]) No Growth At 72 Hours            ASSESSMENT   Failure to thrive    Recent multifocal embolic ischemic infarctions with secondary petechial hemorrhage of the left occipital region  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  Aspiration pneumonitis   Oropharyngeal dysphagia  Vascular dementia with behavioral disturbance   Essential hypertension  Peripheral arterial disease status post right femoral-popliteal bypass, right above-the-knee amputation, and left iliac stenting  Coronary artery disease   Seizure disorder  Chronic obstructive pulmonary disease   Bilateral carotid artery stenosis  Anemia of chronic disease  ho Tobacco abuse    PLAN   Continue attempts at tube feeds  formula changed again, will monitor   continue IV Reglan  If not tolerating by tomorrow, will pursue further workup pending family wishes  Otherwise continue current management including Eliquis      Prophylaxis: Bobby Victor MD  [facility]

## (undated) DEVICE — BINDER ABDOM 4PANEL 12IN SM/MD

## (undated) DEVICE — KIT CANIST SUCTION 1200CC

## (undated) DEVICE — TIP SUCTION YANKAUER

## (undated) DEVICE — KIT SURGICAL COLON .25 1.1OZ

## (undated) DEVICE — CONTAINER SPECIMEN SCREW 4OZ

## (undated) DEVICE — KIT PEG PULL SAFETY 24FR

## (undated) DEVICE — COLLECTION SPECIMEN NEPTUNE

## (undated) DEVICE — TUBING O2 FEMALE CONN 13FT

## (undated) DEVICE — FORCEP ALLIGATOR 2.8MM W/NDL

## (undated) DEVICE — SOL IRRI STRL WATER 1000ML

## (undated) DEVICE — FORCEP BX LG CAP 2.8MMX240CM

## (undated) DEVICE — BAG LABGUARD BIOHAZARD 6X9IN